# Patient Record
Sex: FEMALE | Race: WHITE | NOT HISPANIC OR LATINO | Employment: FULL TIME
[De-identification: names, ages, dates, MRNs, and addresses within clinical notes are randomized per-mention and may not be internally consistent; named-entity substitution may affect disease eponyms.]

---

## 2017-07-15 ENCOUNTER — HEALTH MAINTENANCE LETTER (OUTPATIENT)
Age: 42
End: 2017-07-15

## 2017-12-25 ENCOUNTER — HOSPITAL ENCOUNTER (EMERGENCY)
Facility: CLINIC | Age: 42
Discharge: HOME | End: 2017-12-25
Attending: EMERGENCY MEDICINE | Admitting: EMERGENCY MEDICINE
Payer: COMMERCIAL

## 2017-12-25 VITALS
OXYGEN SATURATION: 97 % | BODY MASS INDEX: 28.17 KG/M2 | HEIGHT: 64 IN | SYSTOLIC BLOOD PRESSURE: 132 MMHG | HEART RATE: 94 BPM | RESPIRATION RATE: 16 BRPM | DIASTOLIC BLOOD PRESSURE: 93 MMHG | WEIGHT: 165 LBS | TEMPERATURE: 98.2 F

## 2017-12-25 DIAGNOSIS — S39.012A STRAIN OF LUMBAR REGION, INITIAL ENCOUNTER: ICD-10-CM

## 2017-12-25 PROCEDURE — 99283 EMERGENCY DEPT VISIT LOW MDM: CPT

## 2017-12-25 PROCEDURE — 25000132 ZZH RX MED GY IP 250 OP 250 PS 637: Performed by: EMERGENCY MEDICINE

## 2017-12-25 RX ORDER — IBUPROFEN 600 MG/1
600 TABLET, FILM COATED ORAL ONCE
Status: COMPLETED | OUTPATIENT
Start: 2017-12-25 | End: 2017-12-25

## 2017-12-25 RX ORDER — METHOCARBAMOL 750 MG/1
750 TABLET, FILM COATED ORAL ONCE
Status: COMPLETED | OUTPATIENT
Start: 2017-12-25 | End: 2017-12-25

## 2017-12-25 RX ORDER — HYDROCODONE BITARTRATE AND ACETAMINOPHEN 5; 325 MG/1; MG/1
1-2 TABLET ORAL EVERY 4 HOURS PRN
Qty: 15 TABLET | Refills: 0 | Status: SHIPPED | OUTPATIENT
Start: 2017-12-25 | End: 2022-05-23

## 2017-12-25 RX ORDER — METHOCARBAMOL 750 MG/1
750 TABLET, FILM COATED ORAL 3 TIMES DAILY PRN
Qty: 30 TABLET | Refills: 0 | Status: SHIPPED | OUTPATIENT
Start: 2017-12-25 | End: 2022-05-23

## 2017-12-25 RX ADMIN — METHOCARBAMOL 750 MG: 750 TABLET ORAL at 15:32

## 2017-12-25 RX ADMIN — IBUPROFEN 600 MG: 600 TABLET ORAL at 15:32

## 2017-12-25 ASSESSMENT — ENCOUNTER SYMPTOMS
ABDOMINAL PAIN: 0
DIZZINESS: 0
FREQUENCY: 0
DIFFICULTY URINATING: 0
WOUND: 0
WEAKNESS: 1
CHILLS: 0
DYSURIA: 0
HEMATURIA: 0
DIARRHEA: 0
NAUSEA: 0
BACK PAIN: 1
FEVER: 0
NUMBNESS: 0
VOMITING: 0

## 2017-12-25 NOTE — ED AVS SNAPSHOT
[facility] Emergency Department    [address]    [facility] [address]    Phone:  [phone]    Fax:  [phone]                                       Elisabet Norton   MRN: [id number]    Department:  [facility] Emergency Department   Date of Visit:  12/25/2017           Patient Information     Date Of Birth          1975        Your diagnoses for this visit were:     Strain of lumbar region, initial encounter        You were seen by Nataliia Santamaria MD.      Follow-up Information     Follow up with Primary care within 1 week.        Follow up with [facility] Emergency Department.    Specialty:  EMERGENCY MEDICINE    Why:  If symptoms worsen    Contact information:    [address]  [facility] [address] [phone]        Discharge Instructions       Take ibuprofen 600 mg 3x per day.  This will provide both pain control and fight against inflammation.   You were given a prescription for Robaxin.  This is a muscle relaxant medication.  Use this as needed for additional pain control.  You were given a prescription for pain medication.  Use this if ibuprofen and robaxin are not working.        Discharge Instructions  Back Pain  You were seen today for back pain. Back pain can have many causes, but most will get better without surgery or other specific treatment. Sometimes there is a herniated ( slipped ) disc. We don t usually do MRI scans to look for these right away, since most herniated discs will get better on their own with time.  Today, we did not find any evidence that your back pain was caused by a serious condition, such as an infection, fracture, or tumor. However, sometimes symptoms develop over time and cannot be found during an emergency visit, so it is very important that you follow up with your primary doctor.  Return to the Emergency Department if:    You develop a fever with your back pain.     You have  weakness or change in sensation in one or both legs.    You lose control of your bowels or bladder, or can t empty your bladder.    Your pain gets much worse.     Follow-up with your doctor:    Unless your pain has completely gone away, please make an appointment with your doctor within one week.  You may need further management of your back pain, such as more pain medication, imaging such as an X-ray or MRI, or physical therapy.    What can I do to help myself?    Remain Active -- People are often afraid that they will hurt their back further or delay recovery by remaining active, but this is one of the best things you can do for your back. In fact, prolonged bed rest is not recommended. Studies have shown that people with low back pain recover faster when they remain active. Movement helps to bring blood flow to the muscles and relieve muscle spasms as well as preventing loss of muscle strength.    Heat -- Using a heating pad can help with low back pain during the first few weeks. Do not sleep with a heating pad, as you can be burned.     Pain medications - You may take a pain medication such as Tylenol  (acetaminophen), Advil , Nuprin  (ibuprofen) or Aleve  (naproxen).  If you have been given a narcotic such as Vicodin  (hydrocodone with acetaminophen), Percocet  (oxycodone with acetaminophen), codeine, or a muscle relaxant such as Flexeril  (cyclobenzaprine) or Soma  (carisoprodol), do not drive for four hours after you have taken it. If the narcotic contains Tylenol  (acetaminophen), do not take Tylenol  with it. All narcotics will cause constipation, so eat a high fiber diet.   If you were given a prescription for medicine here today, be sure to read all of the information (including the package insert) that comes with your prescription.  This will include important information about the medicine, its side effects, and any warnings that you need to know about.  The pharmacist who fills the prescription can  provide more information and answer questions you may have about the medicine.  If you have questions or concerns that the pharmacist cannot address, please call or return to the Emergency Department.   Opioid Medication Information    Pain medications are among the most commonly prescribed medicines, so we are including this information for all our patients. If you did not receive pain medication or get a prescription for pain medicine, you can ignore it.     You may have been given a prescription for an opioid (narcotic) pain medicine and/or have received a pain medicine while here in the Emergency Department. These medicines can make you drowsy or impaired. You must not drive, operate dangerous equipment, or engage in any other dangerous activities while taking these medications. If you drive while taking these medications, you could be arrested for DUI, or driving under the influence. Do not drink any alcohol while you are taking these medications.     Opioid pain medications can cause addiction. If you have a history of chemical dependency of any type, you are at a higher risk of becoming addicted to pain medications.  Only take these prescribed medications to treat your pain when all other options have been tried. Take it for as short a time and as few doses as possible. Store your pain pills in a secure place, as they are frequently stolen and provide a dangerous opportunity for children or visitors in your house to start abusing these powerful medications. We will not replace any lost or stolen medicine.  As soon as your pain is better, you should flush all your remaining medication.     Many prescription pain medications contain Tylenol  (acetaminophen), including Vicodin , Tylenol #3 , Norco , Lortab , and Percocet .  You should not take any extra pills of Tylenol  if you are using these prescription medications or you can get very sick.  Do not ever take more than 3000 mg of acetaminophen in any 24 hour  period.    All opioids tend to cause constipation. Drink plenty of water and eat foods that have a lot of fiber, such as fruits, vegetables, prune juice, apple juice and high fiber cereal.  Take a laxative if you don t move your bowels at least every other day. Miralax , Milk of Magnesia, Colace , or Senna  can be used to keep you regular.      Remember that you can always come back to the Emergency Department if you are not able to see your regular doctor in the amount of time listed above, if you get any new symptoms, or if there is anything that worries you.          24 Hour Appointment Hotline       To make an appointment at any [facility], call [phone] ([phone]). If you don't have a family doctor or clinic, we will help you find one. [facility] are conveniently located to serve the needs of you and your family.          ED Discharge Orders     [facility] PT, HAND, AND CHIROPRACTIC REFERRAL       **This order will print in the [facility] Scheduling Office**    Physical Therapy, Hand Therapy and Chiropractic Care are available through:    *[facility]  *[facility]  *[facility]    Call one number to schedule at any of the above locations: [phone].    Your provider has referred you to: Physical Therapy at [facility] or [facility]    Indication/Reason for Referral: Low Back Pain  Onset of Illness: 1 week  Therapy Orders: Evaluate and Treat  Special Programs: None  Special Request: None    Kourtney Oconnell      Additional Comments for the Therapist or Chiropractor: no    Please be aware that coverage of these services is subject to the terms and limitations of your health insurance plan.  Call member services at your health plan with any benefit or coverage questions.      Please bring the following to your appointment:    *Your personal calendar for scheduling future appointments  *Comfortable clothing                     Review of your medicines      START  taking        Dose / Directions Last dose taken    HYDROcodone-acetaminophen 5-325 MG per tablet   Commonly known as:  NORCO   Dose:  1-2 tablet   Quantity:  15 tablet        Take 1-2 tablets by mouth every 4 hours as needed for moderate to severe pain   Refills:  0        methocarbamol 750 MG tablet   Commonly known as:  ROBAXIN   Dose:  750 mg   Quantity:  30 tablet        Take 1 tablet (750 mg) by mouth 3 times daily as needed for muscle spasms   Refills:  0                Prescriptions were sent or printed at these locations (2 Prescriptions)                   Other Prescriptions                Printed at Department/Unit printer (2 of 2)         methocarbamol (ROBAXIN) 750 MG tablet               HYDROcodone-acetaminophen (NORCO) 5-325 MG per tablet                Orders Needing Specimen Collection     None      Pending Results     No orders found from 12/23/2017 to 12/26/2017.            Pending Culture Results     No orders found from 12/23/2017 to 12/26/2017.            Pending Results Instructions     If you had any lab results that were not finalized at the time of your Discharge, you can call the ED Lab Result RN at [phone]. You will be contacted by this team for any positive Lab results or changes in treatment. The nurses are available 7 days a week from 10A to 6:30P.  You can leave a message 24 hours per day and they will return your call.        Test Results From Your Hospital Stay               Clinical Quality Measure: Blood Pressure Screening     Your blood pressure was checked while you were in the emergency department today. The last reading we obtained was  BP: (!) 132/93 . Please read the guidelines below about what these numbers mean and what you should do about them.  If your systolic blood pressure (the top number) is less than 120 and your diastolic blood pressure (the bottom number) is less than 80, then your blood pressure is normal. There is nothing more that you need to do about  it.  If your systolic blood pressure (the top number) is 120-139 or your diastolic blood pressure (the bottom number) is 80-89, your blood pressure may be higher than it should be. You should have your blood pressure rechecked within a year by a primary care provider.  If your systolic blood pressure (the top number) is 140 or greater or your diastolic blood pressure (the bottom number) is 90 or greater, you may have high blood pressure. High blood pressure is treatable, but if left untreated over time it can put you at risk for heart attack, stroke, or kidney failure. You should have your blood pressure rechecked by a primary care provider within the next 4 weeks.  If your provider in the emergency department today gave you specific instructions to follow-up with your doctor or provider even sooner than that, you should follow that instruction and not wait for up to 4 weeks for your follow-up visit.        Thank you for choosing [facility]       Thank you for choosing [facility] for your care. Our goal is always to provide you with excellent care. Hearing back from our patients is one way we can continue to improve our services. Please take a few minutes to complete the written survey that you may receive in the mail after you visit with us. Thank you!        [employer] Information     [employer] gives you secure access to your electronic health record. If you see a primary care provider, you can also send messages to your care team and make appointments. If you have questions, please call your primary care clinic.  If you do not have a primary care provider, please call [phone] and they will assist you.        Care EveryWhere ID     This is your Care EveryWhere ID. This could be used by other organizations to access your [facility] medical records  [id number]        Equal Access to Services     CRISS BRYSON : adrian Cotton, mary jones  la'joan mara. So [facility] [phone].    ATENCIÓN: Si habla español, tiene a chauhan disposición servicios gratuitos de asistencia lingüística. Llame al [phone].    We comply with applicable federal civil rights laws and [place] laws. We do not discriminate on the basis of race, color, national origin, age, disability, sex, sexual orientation, or gender identity.            After Visit Summary       This is your record. Keep this with you and show to your community pharmacist(s) and doctor(s) at your next visit.

## 2017-12-25 NOTE — ED AVS SNAPSHOT
[facility] Emergency Department    [address]    [facility] [address]    Phone:  [phone]    Fax:  [phone]                                       Elisabet Norton   MRN: [id number]    Department:  [facility] Emergency Department   Date of Visit:  12/25/2017           After Visit Summary Signature Page     I have received my discharge instructions, and my questions have been answered. I have discussed any challenges I see with this plan with the nurse or doctor.    ..........................................................................................................................................  Patient/Patient Representative Signature      ..........................................................................................................................................  Patient Representative Print Name and Relationship to Patient    ..................................................               ................................................  Date                                            Time    ..........................................................................................................................................  Reviewed by Signature/Title    ...................................................              ..............................................  Date                                                            Time

## 2017-12-25 NOTE — ED PROVIDER NOTES
History     Chief Complaint:  Back Pain    HPI   Elisabet Norton is a 42 year old female who presents with back pain. The patient states she first noticed the back pain Saturday 12/16/17 after an 8 hour flight from [place]. The patient states she began taking 800 mg Ibuprofen every 6 hours and had some resolution of her symptoms by this last Saturday 12/23/17, so she has not taken any Ibuprofen since then. Today, the patient reports she bent down and had sudden onset of the same pain. She notes it is primarily localized to the left lower side of her back, and states it somewhat radiates into her left leg. The patient also notes some unpredictable, intermittent pain in her left leg with an electric sensation. During these episodes, she states it is difficult to walk and she has to hold onto something. The patient denies any numbness, tingling, leg swelling, history of blood clots, bowel or bladder incontinence, leg pain, chest pain, injury to the leg or back, fever, urinary symptoms, abdominal pain, vomiting, or diarrhea.  She has no pain or swelling in the leg itself.    Allergies:  No known drug allergies     Medications:    Wellbutrin    Past Medical History:    Diffuse cystic mastopathy    Past Surgical History:    Tonsillectomy    Family History:    Diabetes  Coronary artery disease with myocardial infarction age 62  Heart disease with pacemaker  Alzheimer disease  Lipids    Social History:  Smoking status: Yes, 0.5 packs per day  Alcohol use: Yes, rarely  PCP: Hector Weiss  Marital Status:  [2]     Review of Systems   Constitutional: Negative for chills and fever.   Cardiovascular: Negative for chest pain and leg swelling.   Gastrointestinal: Negative for abdominal pain, diarrhea, nausea and vomiting.   Genitourinary: Negative for difficulty urinating, dysuria, frequency and hematuria.   Musculoskeletal: Positive for back pain.   Skin: Negative for wound.   Neurological: Positive for weakness.  "Negative for dizziness and numbness.   All other systems reviewed and are negative.    Physical Exam     Patient Vitals for the past 24 hrs:   BP Temp Temp src Pulse Heart Rate Resp SpO2 Height Weight   12/25/17 1511 (!) 132/93 98.2  F (36.8  C) Oral 94 94 16 97 % 1.626 m (5' 4\") 74.8 kg (165 lb)     Physical Exam  Gen: alert  CV: 2+ DP and PT pulses, rrr  pulm: ctab  Abdomen: soft, nontender  Back: No midline tenderness, left paraspinous muscle tenderness  MSK: lower extremities with full AROM at hip, knee and ankle, no swelling or calf tenderness  Neuro: 5/5 strength BLE in hip flexion and ext, knee flexion and ext, plantar and dorsiflexion, and extension of EHL, sensation intact to light touch over all dermatomes of the legs  Skin: skin over torso normal    Emergency Department Course   Interventions:  1532: 600 mg Ibuprofen PO  1532: 750 mg Robaxin PO    Emergency Department Course:  Past medical records, nursing notes, and vitals reviewed.  1519: I performed an exam of the patient and obtained history, as documented above.     I rechecked the patient. Findings and plan explained to the patient. Patient discharged home with instructions regarding supportive care, medications, and reasons to return. The importance of close follow-up was reviewed.     Impression & Plan    Medical Decision Making:  Elisabet Norton is a 42 year old female who presents to the ED for evaluation of back pain. The pain has improved with interventions in the emergency department. The patient did not sustain any focal trauma, therefore x-rays are not necessary due to the low likelihood of fracture or subluxation. The patient has not had a fever, saddle/perineal anesthesia, bilateral foot numbness, or bowel or bladder dysfunction. There is no clinical evidence of cauda equina syndrome, discitis, spinal/epidural space hematoma or abscess.     The neurological exam is normal and the patient's symptoms are consistent with a musculoskeletal " strain. There is no current evidence of radiculopathy or myelopathy. The patient will be discharged with pain medications to use as directed.      The patient was directed to avoid heavy lifting, bending or twisting. The patient was told to return for:  increasing pain, numbness, weakness, or bowel or bladder dysfunction.  he patient was advised to schedule follow-up with his/her primary doctor within 3 days to re-assess symptoms. We also provided a referral to physical therapy.    Diagnosis:   ICD-10-CM   Strain of lumbar region, initial encounter S39.012A     Disposition: Discharged to home    Discharge Medications:  New Prescriptions    HYDROCODONE-ACETAMINOPHEN (NORCO) 5-325 MG PER TABLET    Take 1-2 tablets by mouth every 4 hours as needed for moderate to severe pain    METHOCARBAMOL (ROBAXIN) 750 MG TABLET    Take 1 tablet (750 mg) by mouth 3 times daily as needed for muscle spasms     Talia Barrera  12/25/2017   [facility] EMERGENCY DEPARTMENT    I, Talia Barrera, am serving as a scribe at 3:19 PM on 12/25/2017 to document services personally performed by Nataliia Santamaria MD based on my observations and the provider's statements to me.        Nataliia Santamaria MD  12/25/17 5744

## 2017-12-25 NOTE — ED NOTES
Reports left lower back pain radiating to buttock following 8 hour flight from [place] Saturday; states no longer manageable with OTC medications.  ABCs intact.  Denies trauma.

## 2017-12-25 NOTE — LETTER
December 25, 2017      To Whom It May Concern:      Elisabet Norton was seen in our Emergency Department today, 12/25/17.  I expect her condition to improve over the next 1-2 days.  She may return to work/school when improved.    Sincerely,        Bernadine Rob RN

## 2017-12-25 NOTE — DISCHARGE INSTRUCTIONS
Take ibuprofen 600 mg 3x per day.  This will provide both pain control and fight against inflammation.   You were given a prescription for Robaxin.  This is a muscle relaxant medication.  Use this as needed for additional pain control.  You were given a prescription for pain medication.  Use this if ibuprofen and robaxin are not working.        Discharge Instructions  Back Pain  You were seen today for back pain. Back pain can have many causes, but most will get better without surgery or other specific treatment. Sometimes there is a herniated ( slipped ) disc. We don t usually do MRI scans to look for these right away, since most herniated discs will get better on their own with time.  Today, we did not find any evidence that your back pain was caused by a serious condition, such as an infection, fracture, or tumor. However, sometimes symptoms develop over time and cannot be found during an emergency visit, so it is very important that you follow up with your primary doctor.  Return to the Emergency Department if:    You develop a fever with your back pain.     You have weakness or change in sensation in one or both legs.    You lose control of your bowels or bladder, or can t empty your bladder.    Your pain gets much worse.     Follow-up with your doctor:    Unless your pain has completely gone away, please make an appointment with your doctor within one week.  You may need further management of your back pain, such as more pain medication, imaging such as an X-ray or MRI, or physical therapy.    What can I do to help myself?    Remain Active -- People are often afraid that they will hurt their back further or delay recovery by remaining active, but this is one of the best things you can do for your back. In fact, prolonged bed rest is not recommended. Studies have shown that people with low back pain recover faster when they remain active. Movement helps to bring blood flow to the muscles and relieve muscle  spasms as well as preventing loss of muscle strength.    Heat -- Using a heating pad can help with low back pain during the first few weeks. Do not sleep with a heating pad, as you can be burned.     Pain medications - You may take a pain medication such as Tylenol  (acetaminophen), Advil , Nuprin  (ibuprofen) or Aleve  (naproxen).  If you have been given a narcotic such as Vicodin  (hydrocodone with acetaminophen), Percocet  (oxycodone with acetaminophen), codeine, or a muscle relaxant such as Flexeril  (cyclobenzaprine) or Soma  (carisoprodol), do not drive for four hours after you have taken it. If the narcotic contains Tylenol  (acetaminophen), do not take Tylenol  with it. All narcotics will cause constipation, so eat a high fiber diet.   If you were given a prescription for medicine here today, be sure to read all of the information (including the package insert) that comes with your prescription.  This will include important information about the medicine, its side effects, and any warnings that you need to know about.  The pharmacist who fills the prescription can provide more information and answer questions you may have about the medicine.  If you have questions or concerns that the pharmacist cannot address, please call or return to the Emergency Department.   Opioid Medication Information    Pain medications are among the most commonly prescribed medicines, so we are including this information for all our patients. If you did not receive pain medication or get a prescription for pain medicine, you can ignore it.     You may have been given a prescription for an opioid (narcotic) pain medicine and/or have received a pain medicine while here in the Emergency Department. These medicines can make you drowsy or impaired. You must not drive, operate dangerous equipment, or engage in any other dangerous activities while taking these medications. If you drive while taking these medications, you could be arrested  for DUI, or driving under the influence. Do not drink any alcohol while you are taking these medications.     Opioid pain medications can cause addiction. If you have a history of chemical dependency of any type, you are at a higher risk of becoming addicted to pain medications.  Only take these prescribed medications to treat your pain when all other options have been tried. Take it for as short a time and as few doses as possible. Store your pain pills in a secure place, as they are frequently stolen and provide a dangerous opportunity for children or visitors in your house to start abusing these powerful medications. We will not replace any lost or stolen medicine.  As soon as your pain is better, you should flush all your remaining medication.     Many prescription pain medications contain Tylenol  (acetaminophen), including Vicodin , Tylenol #3 , Norco , Lortab , and Percocet .  You should not take any extra pills of Tylenol  if you are using these prescription medications or you can get very sick.  Do not ever take more than 3000 mg of acetaminophen in any 24 hour period.    All opioids tend to cause constipation. Drink plenty of water and eat foods that have a lot of fiber, such as fruits, vegetables, prune juice, apple juice and high fiber cereal.  Take a laxative if you don t move your bowels at least every other day. Miralax , Milk of Magnesia, Colace , or Senna  can be used to keep you regular.      Remember that you can always come back to the Emergency Department if you are not able to see your regular doctor in the amount of time listed above, if you get any new symptoms, or if there is anything that worries you.

## 2019-11-03 ENCOUNTER — HEALTH MAINTENANCE LETTER (OUTPATIENT)
Age: 44
End: 2019-11-03

## 2020-11-16 ENCOUNTER — HEALTH MAINTENANCE LETTER (OUTPATIENT)
Age: 45
End: 2020-11-16

## 2021-02-07 ENCOUNTER — HEALTH MAINTENANCE LETTER (OUTPATIENT)
Age: 46
End: 2021-02-07

## 2021-09-18 ENCOUNTER — HEALTH MAINTENANCE LETTER (OUTPATIENT)
Age: 46
End: 2021-09-18

## 2021-12-10 ENCOUNTER — TRANSFERRED RECORDS (OUTPATIENT)
Dept: MULTI SPECIALTY CLINIC | Facility: CLINIC | Age: 46
End: 2021-12-10
Payer: COMMERCIAL

## 2022-01-08 ENCOUNTER — HEALTH MAINTENANCE LETTER (OUTPATIENT)
Age: 47
End: 2022-01-08

## 2022-03-05 ENCOUNTER — HEALTH MAINTENANCE LETTER (OUTPATIENT)
Age: 47
End: 2022-03-05

## 2022-03-21 ENCOUNTER — OFFICE VISIT (OUTPATIENT)
Dept: FAMILY MEDICINE | Facility: CLINIC | Age: 47
End: 2022-03-21
Payer: COMMERCIAL

## 2022-03-21 VITALS
DIASTOLIC BLOOD PRESSURE: 84 MMHG | WEIGHT: 178.2 LBS | HEIGHT: 66 IN | SYSTOLIC BLOOD PRESSURE: 124 MMHG | BODY MASS INDEX: 28.64 KG/M2 | OXYGEN SATURATION: 96 % | TEMPERATURE: 98.3 F | RESPIRATION RATE: 18 BRPM | HEART RATE: 87 BPM

## 2022-03-21 DIAGNOSIS — Z23 ENCOUNTER FOR IMMUNIZATION: ICD-10-CM

## 2022-03-21 DIAGNOSIS — Z13.9 SCREENING FOR CONDITION: Primary | ICD-10-CM

## 2022-03-21 PROCEDURE — 86706 HEP B SURFACE ANTIBODY: CPT | Performed by: FAMILY MEDICINE

## 2022-03-21 PROCEDURE — 36415 COLL VENOUS BLD VENIPUNCTURE: CPT | Performed by: FAMILY MEDICINE

## 2022-03-21 PROCEDURE — 99203 OFFICE O/P NEW LOW 30 MIN: CPT | Mod: 25 | Performed by: FAMILY MEDICINE

## 2022-03-21 PROCEDURE — 90471 IMMUNIZATION ADMIN: CPT | Performed by: FAMILY MEDICINE

## 2022-03-21 PROCEDURE — 86481 TB AG RESPONSE T-CELL SUSP: CPT | Performed by: FAMILY MEDICINE

## 2022-03-21 PROCEDURE — 90715 TDAP VACCINE 7 YRS/> IM: CPT | Performed by: FAMILY MEDICINE

## 2022-03-21 ASSESSMENT — ENCOUNTER SYMPTOMS
HEADACHES: 0
BACK PAIN: 0
APPETITE CHANGE: 0
SHORTNESS OF BREATH: 0
AGITATION: 0
ACTIVITY CHANGE: 0
BREAST MASS: 0
COUGH: 0

## 2022-03-21 NOTE — PROGRESS NOTES
"  Assessment & Plan     Screening for condition  - patient is starting a new position .  Will obtain screening Gold quantiferon test .  Immunized for hep b , will obtain antibody level .    - Quantiferon TB Gold Plus  - Hepatitis B Surface Antibody    Encounter for immunization  - TDAP VACCINE (Adacel, Boostrix)  [[id number]]         BMI:   Estimated body mass index is 28.98 kg/m  as calculated from the following:    Height as of this encounter: 1.67 m (5' 5.75\").    Weight as of this encounter: 80.8 kg (178 lb 3.2 oz).   Weight management plan: Discussed healthy diet and exercise guidelines        Return in about 6 months (around 9/21/2022) for Routine preventive.    Aminah Navarro MD  [facility] YULY Bhandari is a 46 year old who presents for the following health issues     Imm/Inj  Pertinent negatives include no chest pain, coughing or headaches.   History of Present Illness       Reason for visit:  Immunization    She eats 2-3 servings of fruits and vegetables daily.She consumes 0 sweetened beverage(s) daily.She exercises with enough effort to increase her heart rate 20 to 29 minutes per day.  She exercises with enough effort to increase her heart rate 4 days per week.   She is taking medications regularly.         Review of Systems   Constitutional: Negative for activity change and appetite change.   Respiratory: Negative for cough and shortness of breath.    Cardiovascular: Negative for chest pain.   Breasts:  Negative for breast mass.   Musculoskeletal: Negative for back pain.   Neurological: Negative for headaches.   Psychiatric/Behavioral: Negative for agitation and behavioral problems.            Objective    /84   Pulse 87   Temp 98.3  F (36.8  C) (Oral)   Resp 18   Ht 1.67 m (5' 5.75\")   Wt 80.8 kg (178 lb 3.2 oz)   LMP 03/04/2022 (Approximate)   SpO2 96%   BMI 28.98 kg/m    Body mass index is 28.98 kg/m .  Physical Exam  Pulmonary:      Effort: Pulmonary effort is " normal.      Breath sounds: Normal breath sounds.   Abdominal:      General: Abdomen is flat.      Palpations: Abdomen is soft.   Musculoskeletal:         General: Normal range of motion.   Skin:     General: Skin is warm.   Neurological:      General: No focal deficit present.   Psychiatric:         Mood and Affect: Mood normal.

## 2022-03-21 NOTE — LETTER
March 23, 2022      Elisabet Elizond[place]iguesusanna  [address] [address] [address]        Dear ,    We are writing to inform you of your test results.    Elisabet     Your gold quantiferon blood test is negative .   Your hep B antibody positive for immunity .   No additional test required .   Let me know if you have any additional question .     Thanks   Aminah Navarro MD.      Resulted Orders   Hepatitis B Surface Antibody   Result Value Ref Range    Hepatitis B Surface Antibody 16.82 >=12.00 m[IU]/mL      Comment:      Reactive, Patient is considered to be immune to infection with hepatitis B when the value is greater than or equal to 12.0 mIU/mL.   Quantiferon TB Gold Plus Grey Tube   Result Value Ref Range    Quantiferon Nil Tube 0.05 IU/mL   Quantiferon TB Gold Plus Green Tube   Result Value Ref Range    Quantiferon TB1 Tube 0.36 IU/mL   Quantiferon TB Gold Plus Yellow Tube   Result Value Ref Range    Quantiferon TB2 Tube 0.23    Quantiferon TB Gold Plus Purple Tube   Result Value Ref Range    Quantiferon Mitogen 10.00 IU/mL   Quantiferon TB Gold Plus   Result Value Ref Range    Quantiferon-TB Gold Plus Negative Negative      Comment:      No interferon gamma response to M.tuberculosis antigens was detected. Infection with M.tuberculosis is unlikely, however a single negative result does not exclude infection. In patients at high risk for infection, a second test should be considered in accordance with the 2017 ATS/IDSA/CDC Clinical Pract  ice Guidelines for Diagnosis of Tuberculosis in Adults and Children     TB1 Ag minus Nil Value 0.31 IU/mL    TB2 Ag minus Nil Value 0.18 IU/mL    Mitogen minus Nil Result 9.95 IU/mL    Nil Result 0.05 IU/mL       If you have any questions or concerns, please call the clinic at the number listed above.       Sincerely,      Aminah Navarro MD

## 2022-03-22 LAB
HBV SURFACE AB SERPL IA-ACNC: 16.82 M[IU]/ML
QUANTIFERON MITOGEN: 10 IU/ML
QUANTIFERON NIL TUBE: 0.05 IU/ML
QUANTIFERON TB1 TUBE: 0.36 IU/ML
QUANTIFERON TB2 TUBE: 0.23

## 2022-03-23 LAB
GAMMA INTERFERON BACKGROUND BLD IA-ACNC: 0.05 IU/ML
M TB IFN-G BLD-IMP: NEGATIVE
M TB IFN-G CD4+ BCKGRND COR BLD-ACNC: 9.95 IU/ML
MITOGEN IGNF BCKGRD COR BLD-ACNC: 0.18 IU/ML
MITOGEN IGNF BCKGRD COR BLD-ACNC: 0.31 IU/ML

## 2022-05-23 ENCOUNTER — OFFICE VISIT (OUTPATIENT)
Dept: FAMILY MEDICINE | Facility: CLINIC | Age: 47
End: 2022-05-23
Payer: COMMERCIAL

## 2022-05-23 VITALS
HEIGHT: 65 IN | WEIGHT: 181 LBS | RESPIRATION RATE: 14 BRPM | BODY MASS INDEX: 30.16 KG/M2 | SYSTOLIC BLOOD PRESSURE: 138 MMHG | TEMPERATURE: 98.6 F | DIASTOLIC BLOOD PRESSURE: 89 MMHG | HEART RATE: 80 BPM

## 2022-05-23 DIAGNOSIS — Z00.00 ROUTINE GENERAL MEDICAL EXAMINATION AT A HEALTH CARE FACILITY: Primary | ICD-10-CM

## 2022-05-23 DIAGNOSIS — Z12.4 CERVICAL CANCER SCREENING: ICD-10-CM

## 2022-05-23 DIAGNOSIS — Z13.9 SCREENING FOR CONDITION: ICD-10-CM

## 2022-05-23 DIAGNOSIS — G44.209 TENSION HEADACHE: ICD-10-CM

## 2022-05-23 DIAGNOSIS — L30.9 DERMATITIS: ICD-10-CM

## 2022-05-23 DIAGNOSIS — Z13.220 SCREENING FOR HYPERLIPIDEMIA: ICD-10-CM

## 2022-05-23 DIAGNOSIS — E55.9 VITAMIN D DEFICIENCY: ICD-10-CM

## 2022-05-23 DIAGNOSIS — N95.1 PERIMENOPAUSAL: ICD-10-CM

## 2022-05-23 PROCEDURE — 87624 HPV HI-RISK TYP POOLED RSLT: CPT | Performed by: FAMILY MEDICINE

## 2022-05-23 PROCEDURE — G0145 SCR C/V CYTO,THINLAYER,RESCR: HCPCS | Performed by: FAMILY MEDICINE

## 2022-05-23 PROCEDURE — 99396 PREV VISIT EST AGE 40-64: CPT | Performed by: FAMILY MEDICINE

## 2022-05-23 PROCEDURE — 99213 OFFICE O/P EST LOW 20 MIN: CPT | Mod: 25 | Performed by: FAMILY MEDICINE

## 2022-05-23 RX ORDER — TRIAMCINOLONE ACETONIDE 1 MG/G
OINTMENT TOPICAL
Qty: 80 G | Refills: 0 | Status: SHIPPED | OUTPATIENT
Start: 2022-05-23 | End: 2022-11-23

## 2022-05-23 SDOH — ECONOMIC STABILITY: INCOME INSECURITY: IN THE LAST 12 MONTHS, WAS THERE A TIME WHEN YOU WERE NOT ABLE TO PAY THE MORTGAGE OR RENT ON TIME?: NO

## 2022-05-23 SDOH — ECONOMIC STABILITY: FOOD INSECURITY: WITHIN THE PAST 12 MONTHS, THE FOOD YOU BOUGHT JUST DIDN'T LAST AND YOU DIDN'T HAVE MONEY TO GET MORE.: NEVER TRUE

## 2022-05-23 SDOH — HEALTH STABILITY: PHYSICAL HEALTH: ON AVERAGE, HOW MANY MINUTES DO YOU ENGAGE IN EXERCISE AT THIS LEVEL?: 30 MIN

## 2022-05-23 SDOH — ECONOMIC STABILITY: INCOME INSECURITY: HOW HARD IS IT FOR YOU TO PAY FOR THE VERY BASICS LIKE FOOD, HOUSING, MEDICAL CARE, AND HEATING?: NOT HARD AT ALL

## 2022-05-23 SDOH — ECONOMIC STABILITY: TRANSPORTATION INSECURITY
IN THE PAST 12 MONTHS, HAS LACK OF TRANSPORTATION KEPT YOU FROM MEETINGS, WORK, OR FROM GETTING THINGS NEEDED FOR DAILY LIVING?: NO

## 2022-05-23 SDOH — ECONOMIC STABILITY: FOOD INSECURITY: WITHIN THE PAST 12 MONTHS, YOU WORRIED THAT YOUR FOOD WOULD RUN OUT BEFORE YOU GOT MONEY TO BUY MORE.: NEVER TRUE

## 2022-05-23 SDOH — ECONOMIC STABILITY: TRANSPORTATION INSECURITY
IN THE PAST 12 MONTHS, HAS THE LACK OF TRANSPORTATION KEPT YOU FROM MEDICAL APPOINTMENTS OR FROM GETTING MEDICATIONS?: NO

## 2022-05-23 SDOH — HEALTH STABILITY: PHYSICAL HEALTH: ON AVERAGE, HOW MANY DAYS PER WEEK DO YOU ENGAGE IN MODERATE TO STRENUOUS EXERCISE (LIKE A BRISK WALK)?: 1 DAY

## 2022-05-23 ASSESSMENT — LIFESTYLE VARIABLES
AUDIT-C TOTAL SCORE: 1
HOW MANY STANDARD DRINKS CONTAINING ALCOHOL DO YOU HAVE ON A TYPICAL DAY: 1 OR 2
HOW OFTEN DO YOU HAVE A DRINK CONTAINING ALCOHOL: MONTHLY OR LESS
SKIP TO QUESTIONS 9-10: 1
HOW OFTEN DO YOU HAVE SIX OR MORE DRINKS ON ONE OCCASION: NEVER

## 2022-05-23 ASSESSMENT — SOCIAL DETERMINANTS OF HEALTH (SDOH)
DO YOU BELONG TO ANY CLUBS OR ORGANIZATIONS SUCH AS CHURCH GROUPS UNIONS, FRATERNAL OR ATHLETIC GROUPS, OR SCHOOL GROUPS?: YES
HOW OFTEN DO YOU GET TOGETHER WITH FRIENDS OR RELATIVES?: ONCE A WEEK
IN A TYPICAL WEEK, HOW MANY TIMES DO YOU TALK ON THE PHONE WITH FAMILY, FRIENDS, OR NEIGHBORS?: MORE THAN THREE TIMES A WEEK
HOW OFTEN DO YOU ATTEND CHURCH OR RELIGIOUS SERVICES?: NEVER

## 2022-05-23 ASSESSMENT — ENCOUNTER SYMPTOMS
CHILLS: 0
NAUSEA: 0
SORE THROAT: 0
SHORTNESS OF BREATH: 0
HEARTBURN: 0
FEVER: 0
DIARRHEA: 0
COUGH: 0
EYE PAIN: 0
FREQUENCY: 0
PALPITATIONS: 0
HEMATURIA: 0
CONSTIPATION: 0
ARTHRALGIAS: 0
WEAKNESS: 0
HEMATOCHEZIA: 0
PARESTHESIAS: 0
MYALGIAS: 0
HEADACHES: 1
JOINT SWELLING: 0
ABDOMINAL PAIN: 0
BREAST MASS: 0
DIZZINESS: 0
NERVOUS/ANXIOUS: 0
DYSURIA: 0

## 2022-05-23 NOTE — PROGRESS NOTES
SUBJECTIVE:   CC: Saloni Norton is an 46 year old woman who presents for preventive health visit.       Patient has been advised of split billing requirements and indicates understanding: Yes  Healthy Habits:     Getting at least 3 servings of Calcium per day:  Yes    Bi-annual eye exam:  Yes    Dental care twice a year:  Yes    Sleep apnea or symptoms of sleep apnea:  None    Diet:  Carbohydrate counting    Frequency of exercise:  1 day/week    Taking medications regularly:  Not Applicable    Medication side effects:  None    PHQ-2 Total Score: 0    Additional concerns today:  Yes    In clinic for annual physical exam     Patient describes doing well       Headaches in the back starts with neck pain .  Difficulty loosing weight and dry skin around ears .    Today's PHQ-2 Score:   PHQ-2 (  [employer]) 2022   Q1: Little interest or pleasure in doing things 0   Q2: Feeling down, depressed or hopeless 0   PHQ-2 Score 0   Q1: Little interest or pleasure in doing things Not at all   Q2: Feeling down, depressed or hopeless Not at all   PHQ-2 Score 0       Abuse: Current or Past (Physical, Sexual or Emotional) - No  Do you feel safe in your environment? Yes    Have you ever done Advance Care Planning? (For example, a Health Directive, POLST, or a discussion with a medical provider or your loved ones about your wishes): No, advance care planning information given to patient to review.  Patient plans to discuss their wishes with loved ones or provider.      Social History     Tobacco Use     Smoking status: Former Smoker     Packs/day: 0.50     Years: 10.00     Pack years: 5.00     Quit date: 2004     Years since quittin.9     Smokeless tobacco: Never Used     Tobacco comment: total smoking about 5 pack years   Substance Use Topics     Alcohol use: Yes     Comment: less than monthly         Alcohol Use 2022   Prescreen: >3 drinks/day or >7 drinks/week? No   Prescreen: >3 drinks/day or >7 drinks/week?  -       Reviewed orders with patient.  Reviewed health maintenance and updated orders accordingly - Yes      Breast Cancer Screening:    FHS-7: No flowsheet data found.  click delete button to remove this line now  Mammogram Screening: Recommended annual mammography  Pertinent mammograms are reviewed under the imaging tab.    History of abnormal Pap smear: NO - age 30-65 PAP every 5 years with negative HPV co-testing recommended  PAP / HPV 5/12/2010 10/10/2008 1/3/2006   PAP (Historical) NIL NIL NIL     Reviewed and updated as needed this visit by clinical staff   Tobacco  Allergies  Meds  Problems  Med Hx  Surg Hx  Fam Hx  Soc   Hx          Reviewed and updated as needed this visit by Provider   Tobacco  Allergies  Meds  Problems  Med Hx  Surg Hx  Fam Hx           Past Medical History:   Diagnosis Date     Diffuse cystic mastopathy     Fibrocystic breasts, asymmetric breast tissue     SUPRV HI-RISK PREG-YOUNG MULTIP 7/28/2006     Tobacco use disorder     quit 2004      Past Surgical History:   Procedure Laterality Date     TONSILLECTOMY  1980    unilateral (right) tonsillectomy       Review of Systems   Constitutional: Negative for chills and fever.   HENT: Negative for congestion, ear pain, hearing loss and sore throat.    Eyes: Negative for pain and visual disturbance.   Respiratory: Negative for cough and shortness of breath.    Cardiovascular: Negative for chest pain, palpitations and peripheral edema.   Gastrointestinal: Negative for abdominal pain, constipation, diarrhea, heartburn, hematochezia and nausea.   Breasts:  Negative for tenderness, breast mass and discharge.   Genitourinary: Negative for dysuria, frequency, genital sores, hematuria, pelvic pain, urgency, vaginal bleeding and vaginal discharge.   Musculoskeletal: Negative for arthralgias, joint swelling and myalgias.   Skin: Negative for rash.   Neurological: Positive for headaches. Negative for dizziness, weakness and paresthesias.  "  Psychiatric/Behavioral: Negative for mood changes. The patient is not nervous/anxious.           OBJECTIVE:   /89 (BP Location: Right arm, Patient Position: Sitting, Cuff Size: Adult Regular)   Pulse 80   Temp 98.6  F (37  C) (Oral)   Resp 14   Ht 1.651 m (5' 5\")   Wt 82.1 kg (181 lb)   LMP 04/30/2022 (Exact Date)   Breastfeeding No   BMI 30.12 kg/m    Physical Exam  GENERAL: healthy, alert and no distress  EYES: Eyes grossly normal to inspection, PERRL and conjunctivae and sclerae normal  HENT: ear canals and TM's normal, nose and mouth without ulcers or lesions  NECK: no adenopathy, no asymmetry, masses, or scars and thyroid normal to palpation  RESP: lungs clear to auscultation - no rales, rhonchi or wheezes  BREAST: normal without masses, tenderness or nipple discharge and no palpable axillary masses or adenopathy  CV: regular rate and rhythm, normal S1 S2, no S3 or S4, no murmur, click or rub, no peripheral edema and peripheral pulses strong  ABDOMEN: soft, nontender, no hepatosplenomegaly, no masses and bowel sounds normal  MS: no gross musculoskeletal defects noted, no edema  SKIN: no suspicious lesions or rashes  NEURO: Normal strength and tone, mentation intact and speech normal  PSYCH: mentation appears normal, affect normal/bright    Diagnostic Test Results:  Labs reviewed in Epic    ASSESSMENT/PLAN:   (Z00.00) Routine general medical examination at a health care facility  (primary encounter diagnosis)  Comment: Discussed healthy eating   Discussed maintaining ideal weight   Plan: Discussed different ways of loosing weight .    (Z12.4) Cervical cancer screening  Comment:   Plan: Pap Screen with HPV - recommended age 30 - 65         years        ASCUS  with negative HPV  .       (Z13.220) Screening for hyperlipidemia  Comment:   Plan: Lipid panel reflex to direct LDL Fasting,         CANCELED: Lipid panel reflex to direct LDL         Fasting            (Z13.9) Screening for " "condition  Comment:   Plan: Hemoglobin A1c, Basic metabolic panel  (Ca, Cl,        CO2, Creat, Gluc, K, Na, BUN), TSH with free T4        reflex            (N95.1) Perimenopausal  Comment: discussed regular exercise   Plan: Follicle stimulating hormone, Luteinizing         Hormone, Adult            (G44.209) Tension headache  Comment: Discussed neck stretching exercise   Plan: CBC with platelets           (E55.9) Vitamin D deficiency  Comment:  Plan: Vitamin D Deficiency            (L30.9) Dermatitis  Comment:   Plan: triamcinolone (KENALOG) 0.1 % external ointment                COUNSELING:  Reviewed preventive health counseling, as reflected in patient instructions       Regular exercise       Healthy diet/nutrition       Vision screening       Hearing screening    Estimated body mass index is 30.12 kg/m  as calculated from the following:    Height as of this encounter: 1.651 m (5' 5\").    Weight as of this encounter: 82.1 kg (181 lb).    Weight management plan: Discussed healthy diet and exercise guidelines    She reports that she quit smoking about 17 years ago. She has a 5.00 pack-year smoking history. She has never used smokeless tobacco.      Counseling Resources:  ATP IV Guidelines  Pooled Cohorts Equation Calculator  Breast Cancer Risk Calculator  BRCA-Related Cancer Risk Assessment: FHS-7 Tool  FRAX Risk Assessment  ICSI Preventive Guidelines  Dietary Guidelines for Americans, 2010  USDA's MyPlate  ASA Prophylaxis  Lung CA Screening    Aminah Navarro MD  [facility]  "

## 2022-05-26 LAB
BKR LAB AP GYN ADEQUACY: NORMAL
BKR LAB AP GYN INTERPRETATION: NORMAL
BKR LAB AP HPV REFLEX: NORMAL
BKR LAB AP PREVIOUS ABNL DX: NORMAL
BKR LAB AP PREVIOUS ABNORMAL: NORMAL
PATH REPORT.COMMENTS IMP SPEC: NORMAL
PATH REPORT.COMMENTS IMP SPEC: NORMAL
PATH REPORT.RELEVANT HX SPEC: NORMAL

## 2022-05-30 LAB
HUMAN PAPILLOMA VIRUS 16 DNA: NEGATIVE
HUMAN PAPILLOMA VIRUS 18 DNA: NEGATIVE
HUMAN PAPILLOMA VIRUS FINAL DIAGNOSIS: NORMAL
HUMAN PAPILLOMA VIRUS OTHER HR: NEGATIVE

## 2022-06-04 ENCOUNTER — LAB (OUTPATIENT)
Dept: LAB | Facility: CLINIC | Age: 47
End: 2022-06-04
Payer: COMMERCIAL

## 2022-06-04 DIAGNOSIS — Z13.9 SCREENING FOR CONDITION: ICD-10-CM

## 2022-06-04 DIAGNOSIS — G44.209 TENSION HEADACHE: ICD-10-CM

## 2022-06-04 DIAGNOSIS — E55.9 VITAMIN D DEFICIENCY: ICD-10-CM

## 2022-06-04 DIAGNOSIS — N95.1 PERIMENOPAUSAL: ICD-10-CM

## 2022-06-04 DIAGNOSIS — Z13.220 SCREENING FOR HYPERLIPIDEMIA: ICD-10-CM

## 2022-06-04 LAB
ANION GAP SERPL CALCULATED.3IONS-SCNC: 3 MMOL/L (ref 3–14)
BUN SERPL-MCNC: 18 MG/DL (ref 7–30)
CALCIUM SERPL-MCNC: 9 MG/DL (ref 8.5–10.1)
CHLORIDE BLD-SCNC: 106 MMOL/L (ref 94–109)
CHOLEST SERPL-MCNC: 175 MG/DL
CO2 SERPL-SCNC: 30 MMOL/L (ref 20–32)
CREAT SERPL-MCNC: 0.72 MG/DL (ref 0.52–1.04)
ERYTHROCYTE [DISTWIDTH] IN BLOOD BY AUTOMATED COUNT: 13.3 % (ref 10–15)
FASTING STATUS PATIENT QL REPORTED: YES
FSH SERPL-ACNC: 1 IU/L
GFR SERPL CREATININE-BSD FRML MDRD: >90 ML/MIN/1.73M2
GLUCOSE BLD-MCNC: 98 MG/DL (ref 70–99)
HBA1C MFR BLD: 5.2 % (ref 0–5.6)
HCT VFR BLD AUTO: 43.4 % (ref 35–47)
HDLC SERPL-MCNC: 75 MG/DL
HGB BLD-MCNC: 15.1 G/DL (ref 11.7–15.7)
LDLC SERPL CALC-MCNC: 81 MG/DL
LH SERPL-ACNC: 7.4 IU/L
MCH RBC QN AUTO: 29.4 PG (ref 26.5–33)
MCHC RBC AUTO-ENTMCNC: 34.8 G/DL (ref 31.5–36.5)
MCV RBC AUTO: 85 FL (ref 78–100)
NONHDLC SERPL-MCNC: 100 MG/DL
PLATELET # BLD AUTO: 175 10E3/UL (ref 150–450)
POTASSIUM BLD-SCNC: 4.2 MMOL/L (ref 3.4–5.3)
RBC # BLD AUTO: 5.13 10E6/UL (ref 3.8–5.2)
SODIUM SERPL-SCNC: 139 MMOL/L (ref 133–144)
TRIGL SERPL-MCNC: 93 MG/DL
TSH SERPL DL<=0.005 MIU/L-ACNC: 1.1 MU/L (ref 0.4–4)
WBC # BLD AUTO: 6.4 10E3/UL (ref 4–11)

## 2022-06-04 PROCEDURE — 83036 HEMOGLOBIN GLYCOSYLATED A1C: CPT

## 2022-06-04 PROCEDURE — 85027 COMPLETE CBC AUTOMATED: CPT

## 2022-06-04 PROCEDURE — 80048 BASIC METABOLIC PNL TOTAL CA: CPT

## 2022-06-04 PROCEDURE — 84443 ASSAY THYROID STIM HORMONE: CPT

## 2022-06-04 PROCEDURE — 36415 COLL VENOUS BLD VENIPUNCTURE: CPT

## 2022-06-04 PROCEDURE — 80061 LIPID PANEL: CPT

## 2022-06-04 PROCEDURE — 82306 VITAMIN D 25 HYDROXY: CPT

## 2022-06-04 PROCEDURE — 83001 ASSAY OF GONADOTROPIN (FSH): CPT

## 2022-06-04 PROCEDURE — 83002 ASSAY OF GONADOTROPIN (LH): CPT

## 2022-06-06 LAB — DEPRECATED CALCIDIOL+CALCIFEROL SERPL-MC: 54 UG/L (ref 20–75)

## 2022-08-14 ENCOUNTER — OFFICE VISIT (OUTPATIENT)
Dept: URGENT CARE | Facility: URGENT CARE | Age: 47
End: 2022-08-14
Payer: COMMERCIAL

## 2022-08-14 VITALS
HEART RATE: 87 BPM | SYSTOLIC BLOOD PRESSURE: 114 MMHG | RESPIRATION RATE: 24 BRPM | TEMPERATURE: 98.4 F | OXYGEN SATURATION: 97 % | DIASTOLIC BLOOD PRESSURE: 76 MMHG

## 2022-08-14 DIAGNOSIS — J01.90 ACUTE SINUSITIS WITH COEXISTING CONDITION REQUIRING PROPHYLACTIC TREATMENT: ICD-10-CM

## 2022-08-14 DIAGNOSIS — H92.02 OTALGIA, LEFT: Primary | ICD-10-CM

## 2022-08-14 PROCEDURE — 99213 OFFICE O/P EST LOW 20 MIN: CPT | Performed by: FAMILY MEDICINE

## 2022-08-14 NOTE — PROGRESS NOTES
SUBJECTIVE:   Saloni Norton is a 47 year old female presenting with a chief complaint of left ear pain.  Developed more left sided sinus and headache.    Onset of symptoms was 1 day(s) ago.  Course of illness is worsening.    Severity mild  Current and Associated symptoms: left ear pain, sinus and headache  Treatment measures tried include None tried.  Predisposing factors include None.    Past Medical History:   Diagnosis Date     Diffuse cystic mastopathy     Fibrocystic breasts, asymmetric breast tissue     SUPRV HI-RISK PREG-YOUNG MULTIP 2006     Tobacco use disorder     quit      Current Outpatient Medications   Medication Sig Dispense Refill     triamcinolone (KENALOG) 0.1 % external ointment EVERY OTHER NIGHT ON AFFECTED AREA FOR 2 WEEKS . 80 g 0     Social History     Tobacco Use     Smoking status: Former Smoker     Packs/day: 0.50     Years: 10.00     Pack years: 5.00     Quit date: 2004     Years since quittin.2     Smokeless tobacco: Never Used     Tobacco comment: total smoking about 5 pack years   Substance Use Topics     Alcohol use: Yes     Comment: less than monthly       ROS:  Review of systems negative except as stated above.    OBJECTIVE:  /76   Pulse 87   Temp 98.4  F (36.9  C) (Oral)   Resp 24   LMP 08/10/2022   SpO2 97%   Breastfeeding No   GENERAL APPEARANCE: healthy, alert and no distress  EYES: EOMI,  PERRL, conjunctiva clear  HENT: ear canals and TM's normal.  Nose and mouth without ulcers, erythema or lesions.  Tenderness on left maxillary sinus on percussion  RESP: lungs with no audible wheezes or increase work of breathing  PSYCH: mentation appears normal and affect normal/bright    ASSESSMENT/PLAN:  (H92.02) Otalgia, left  (primary encounter diagnosis)  Comment: eustachian tube dysfunction  Plan: flonase, sudafed    (J01.90) Acute sinusitis with coexisting condition requiring prophylactic treatment  Plan: amoxicillin-clavulanate (AUGMENTIN) 875-125 MG          tablet            Reassurance given, reviewed that does not have acute ear infection and that ear pain most likely referred pain/eusthacian tube dysfunction.  Okay for flonase or sudafed, tylenol and ibuprofen as needed.  Discussed that if sinus symptoms worsens and/or persists for 10 days, then okay to start antibiotic - RX Augmentin printed and given    Follow up with primary provider if no improvement of symptoms in 1-2 weeks    Sincere Gilmore MD  August 14, 2022 5:39 PM

## 2022-11-08 ENCOUNTER — E-VISIT (OUTPATIENT)
Dept: FAMILY MEDICINE | Facility: CLINIC | Age: 47
End: 2022-11-08
Payer: COMMERCIAL

## 2022-11-08 DIAGNOSIS — R79.89 ABNORMAL TSH: Primary | ICD-10-CM

## 2022-11-08 PROCEDURE — 99207 PR NON-BILLABLE SERV PER CHARTING: CPT | Performed by: FAMILY MEDICINE

## 2022-11-08 NOTE — PATIENT INSTRUCTIONS
Thank you for choosing us for your care. I think an in-clinic visit would be best next steps based on your symptoms. Please schedule a clinic appointment; you won t be charged for this eVisit.      You can schedule an appointment right here in [facility], or call [phone]

## 2022-11-08 NOTE — TELEPHONE ENCOUNTER
Provider E-Visit time total (minutes):     I will have my staff call you to set up a visit in clinic.  Please bring scan with you .    Aminah

## 2022-11-18 ENCOUNTER — TELEPHONE (OUTPATIENT)
Dept: FAMILY MEDICINE | Facility: CLINIC | Age: 47
End: 2022-11-18

## 2022-11-18 ENCOUNTER — OFFICE VISIT (OUTPATIENT)
Dept: FAMILY MEDICINE | Facility: CLINIC | Age: 47
End: 2022-11-18
Payer: COMMERCIAL

## 2022-11-18 VITALS
WEIGHT: 186 LBS | DIASTOLIC BLOOD PRESSURE: 80 MMHG | SYSTOLIC BLOOD PRESSURE: 112 MMHG | HEART RATE: 82 BPM | RESPIRATION RATE: 18 BRPM | OXYGEN SATURATION: 99 % | TEMPERATURE: 98.4 F | HEIGHT: 65 IN | BODY MASS INDEX: 30.99 KG/M2

## 2022-11-18 DIAGNOSIS — E04.1 THYROID NODULE: Primary | ICD-10-CM

## 2022-11-18 DIAGNOSIS — Z12.31 VISIT FOR SCREENING MAMMOGRAM: ICD-10-CM

## 2022-11-18 PROCEDURE — 99213 OFFICE O/P EST LOW 20 MIN: CPT | Performed by: FAMILY MEDICINE

## 2022-11-18 NOTE — TELEPHONE ENCOUNTER
Called Radiology regarding patients report she brought it.     They will not take this form as a consult. You will need to place a new Formal US first , and once completed a group of radiologists will review the results and if they believe it is necessary or needed they will then schedule patient for a biopsy at a later date, they will not do them together    Please place new referral for US if this is the next plan of action for patient     Deepti Alonzo/

## 2022-11-18 NOTE — Clinical Note
Please abstract the following data from this visit with this patient into the appropriate field in Epic:  Tests that can be patient reported without a hard copy:  Other Tests found in the patient's chart through Chart Review/Care Everywhere:  Mammogram done by this group Ramirez this date: 12/10/21 / found in Care Everywhere  Note to Abstraction: If this section is blank, no results were found via Chart Review/Care Everywhere.

## 2022-11-18 NOTE — TELEPHONE ENCOUNTER
Can we please let patient know .  I have placed the thyroid ultrasound scan order .    Thanks   Aminah Navarro md .

## 2022-11-18 NOTE — PROGRESS NOTES
"  Assessment & Plan     Thyroid nodule  Patient was visiting her family outside country .  She seen an endocrinologist , ultrasound scan thyroid demonstrated a thyroid nodule .  - US Biopsy Thyroid Fine Needle Aspiration; Future  - Adult Endocrinology  Referral; Future    Will reach out with the results .    Visit for screening mammogram  Scan ordered            BMI:   Estimated body mass index is 30.95 kg/m  as calculated from the following:    Height as of this encounter: 1.651 m (5' 5\").    Weight as of this encounter: 84.4 kg (186 lb).   Weight management plan: Discussed healthy diet and exercise guidelines        Return in about 6 months (around 5/18/2023) for Follow up, patient will call.    Aminah Navarro MD  [facility] YULY Bhandari is a 47 year old, presenting for the following health issues:  Results (Thyroid U/S)      History of Present Illness       Reason for visit:  Go over ultrasound    She eats 2-3 servings of fruits and vegetables daily.She consumes 0 sweetened beverage(s) daily.She exercises with enough effort to increase her heart rate 9 or less minutes per day.  She exercises with enough effort to increase her heart rate 3 or less days per week.   She is taking medications regularly.     Thyroid Ultrasound done 10/27/22, Done out of country,  [place] .         Review of Systems   Endocrine:        Thyroid nodule .         Objective    /80   Pulse 82   Temp 98.4  F (36.9  C) (Oral)   Resp 18   Ht 1.651 m (5' 5\")   Wt 84.4 kg (186 lb)   LMP 11/10/2022 (Exact Date)   SpO2 99%   BMI 30.95 kg/m    Body mass index is 30.95 kg/m .  Physical Exam  Vitals reviewed.   Constitutional:       Appearance: Normal appearance.   Cardiovascular:      Pulses: Normal pulses.   Pulmonary:      Effort: Pulmonary effort is normal.   Musculoskeletal:      Cervical back: Normal range of motion.   Neurological:      Mental Status: She is alert.                    "

## 2022-11-19 ENCOUNTER — HEALTH MAINTENANCE LETTER (OUTPATIENT)
Age: 47
End: 2022-11-19

## 2022-11-23 ENCOUNTER — HOSPITAL ENCOUNTER (OUTPATIENT)
Dept: ULTRASOUND IMAGING | Facility: CLINIC | Age: 47
Discharge: HOME | End: 2022-11-23
Attending: FAMILY MEDICINE
Payer: COMMERCIAL

## 2022-11-23 DIAGNOSIS — E04.1 THYROID NODULE: ICD-10-CM

## 2022-11-23 DIAGNOSIS — R89.6 ABNORMAL CYTOLOGY: ICD-10-CM

## 2022-11-23 PROCEDURE — 88173 CYTOPATH EVAL FNA REPORT: CPT | Mod: 26

## 2022-11-23 PROCEDURE — 10005 FNA BX W/US GDN 1ST LES: CPT

## 2022-11-23 PROCEDURE — 250N000009 HC RX 250: Performed by: RADIOLOGY

## 2022-11-23 PROCEDURE — 76536 US EXAM OF HEAD AND NECK: CPT

## 2022-11-23 PROCEDURE — 88173 CYTOPATH EVAL FNA REPORT: CPT | Mod: TC | Performed by: FAMILY MEDICINE

## 2022-11-23 RX ADMIN — LIDOCAINE HYDROCHLORIDE 10 ML: 10 INJECTION, SOLUTION EPIDURAL; INFILTRATION; INTRACAUDAL; PERINEURAL at 09:31

## 2022-11-23 NOTE — PROGRESS NOTES
Patient tolerated left thyroid nodule and isthmus biopsy well by Dr. Mayer.  Bandage clean, dry and intact at time of discharge.  Patient states understanding of discharge instructions and left department in satisfactory condition.

## 2022-11-27 DIAGNOSIS — E04.1 THYROID NODULE: Primary | ICD-10-CM

## 2022-11-29 LAB
PATH REPORT.COMMENTS IMP SPEC: ABNORMAL
PATH REPORT.COMMENTS IMP SPEC: YES
PATH REPORT.COMMENTS IMP SPEC: YES
PATH REPORT.FINAL DX SPEC: ABNORMAL
PATH REPORT.FINAL DX SPEC: ABNORMAL
PATH REPORT.GROSS SPEC: ABNORMAL
PATH REPORT.GROSS SPEC: ABNORMAL
PATH REPORT.MICROSCOPIC SPEC OTHER STN: ABNORMAL
PATH REPORT.MICROSCOPIC SPEC OTHER STN: ABNORMAL
PATH REPORT.RELEVANT HX SPEC: ABNORMAL
PATH REPORT.RELEVANT HX SPEC: ABNORMAL

## 2022-11-30 ENCOUNTER — OFFICE VISIT (OUTPATIENT)
Dept: URGENT CARE | Facility: URGENT CARE | Age: 47
End: 2022-11-30
Payer: COMMERCIAL

## 2022-11-30 VITALS
HEART RATE: 77 BPM | SYSTOLIC BLOOD PRESSURE: 141 MMHG | TEMPERATURE: 98.3 F | OXYGEN SATURATION: 97 % | DIASTOLIC BLOOD PRESSURE: 88 MMHG

## 2022-11-30 DIAGNOSIS — R30.0 DYSURIA: Primary | ICD-10-CM

## 2022-11-30 DIAGNOSIS — E04.1 THYROID NODULE: Primary | ICD-10-CM

## 2022-11-30 LAB
ALBUMIN UR-MCNC: NEGATIVE MG/DL
APPEARANCE UR: CLEAR
BACTERIA #/AREA URNS HPF: ABNORMAL /HPF
BILIRUB UR QL STRIP: NEGATIVE
COLOR UR AUTO: YELLOW
GLUCOSE UR STRIP-MCNC: NEGATIVE MG/DL
HGB UR QL STRIP: NEGATIVE
KETONES UR STRIP-MCNC: NEGATIVE MG/DL
LEUKOCYTE ESTERASE UR QL STRIP: ABNORMAL
NITRATE UR QL: NEGATIVE
PH UR STRIP: 6 [PH] (ref 5–7)
RBC #/AREA URNS AUTO: ABNORMAL /HPF
SP GR UR STRIP: 1.01 (ref 1–1.03)
SQUAMOUS #/AREA URNS AUTO: ABNORMAL /LPF
UROBILINOGEN UR STRIP-ACNC: 0.2 E.U./DL
WBC #/AREA URNS AUTO: ABNORMAL /HPF

## 2022-11-30 PROCEDURE — 81001 URINALYSIS AUTO W/SCOPE: CPT

## 2022-11-30 PROCEDURE — 99213 OFFICE O/P EST LOW 20 MIN: CPT | Performed by: FAMILY MEDICINE

## 2022-12-01 NOTE — PROGRESS NOTES
Assessment & Plan     Dysuria    - UA Macro with Reflex to Micro and Culture - lab collect; Future  - UA Macro with Reflex to Micro and Culture - lab collect  - Urine Microscopic    Patient reassured with negative UA today.  Recommend increased fluids and close Follow-up if no change or new or worsening sx prn.    Nazanin Motley MD  [facility] CARE YULY Bhandari is a 47 year old, presenting for the following health issues:  Rule out Urinary Tract Infection (Patient is a 47 yr old female who presents with possible uti. Patient reports onset of symptoms was on Monday, symptoms being urine urgency, blood in urine, pain in lower left side of back)      HPI     Presents to Follow-up LEFT flank pain and urinary urgency.  This went away on own but now ache again noted in LEFT low back.  No fever or flank pain.  Still menstruating.  No vaginal discharge.  No hx of kidney stones.      Review of Systems   Constitutional, HEENT, cardiovascular, pulmonary, GI, , musculoskeletal, neuro, skin, endocrine and psych systems are negative, except as otherwise noted.      Objective    BP (!) 141/88 (BP Location: Right arm, Patient Position: Chair, Cuff Size: Adult Regular)   Pulse 77   Temp 98.3  F (36.8  C) (Oral)   LMP 11/10/2022 (Exact Date)   SpO2 97%   There is no height or weight on file to calculate BMI.  Physical Exam   GENERAL: healthy, alert and no distress  EYES: Eyes grossly normal to inspection, PERRL and conjunctivae and sclerae normal  MS: no gross musculoskeletal defects noted, no edema  SKIN: no suspicious lesions or rashes  PSYCH: mentation appears normal, affect normal/bright    Results for orders placed or performed in visit on 11/30/22 (from the past 24 hour(s))   UA Macro with Reflex to Micro and Culture - lab collect    Specimen: Drain; Urine   Result Value Ref Range    Color Urine Yellow Colorless, Straw, Light Yellow, Yellow    Appearance Urine Clear Clear     Glucose Urine Negative Negative mg/dL    Bilirubin Urine Negative Negative    Ketones Urine Negative Negative mg/dL    Specific Gravity Urine 1.010 1.003 - 1.035    Blood Urine Negative Negative    pH Urine 6.0 5.0 - 7.0    Protein Albumin Urine Negative Negative mg/dL    Urobilinogen Urine 0.2 0.2, 1.0 E.U./dL    Nitrite Urine Negative Negative    Leukocyte Esterase Urine Small (A) Negative   Urine Microscopic   Result Value Ref Range    Bacteria Urine Few (A) None Seen /HPF    RBC Urine 0-2 0-2 /HPF /HPF    WBC Urine 0-5 0-5 /HPF /HPF    Squamous Epithelials Urine Few (A) None Seen /LPF    Narrative    Urine Culture not indicated

## 2022-12-01 NOTE — TELEPHONE ENCOUNTER
DX, Referring NOTES: Thyroid Nodule; referred by Dr. romero    For Cancer Patients: Need the original operative and surgical pathology reports and all imaging reports/images related to the disease (includes all thyroid US, nuclear thyroid and total body scans, PET scans, chest CT reports since prior to the diagnosis ).   APPT DATE: 12/5/2022   NOTES (FOR ALL VISITS) STATUS DETAILS   OFFICE NOTES from referring provider Internal [facility]:  11/18/22, 11/8/22 - [facility] OV with Dr. Romero   OFFICE NOTES from other specialist N/A    ED NOTES N/A    OPERATIVE REPORT  (thyroid, pituitary, adrenal, parathyroid)  (All op notes related to diagnoses) N/A    MEDICATION LIST Internal    IMAGING      ULTRASOUND (HEAD/NECK)  * Include FNAs Internal [facility]:  11/23/22 - US Thyroid FNA  11/23/22 - US Thyroid   LABS     DIABETES: HBGA1C, CREATININE, FASTING LIPIDS, MICROALBUMIN URINE, POTASSIUM, TSH, T4    THYROID: TSH, T4, CBC, THYRODLONULIN, TOTAL T3, FREE T4, CALCITONIN, CEA Internal [facility]:  11/30/22 - Urine Analysis  6/4/22 - BMP  6/4/22 - CBC  6/4/22 - HBGA1C  6/4/22 - Lipid  6/4/22 - TSH, T4  6/4/22 - Vitamin D  10/6/11 - CMP   PATHOLOGY REPORTS WITH CASE NUMBER  *Surgical path reports for endocrine organs (ovaries, testes, pancreas, etc) Internal   [facility]:  11/23/22 - Thyroid FNA (Case: [id number])

## 2022-12-05 ENCOUNTER — PRE VISIT (OUTPATIENT)
Dept: ENDOCRINOLOGY | Facility: CLINIC | Age: 47
End: 2022-12-05

## 2022-12-05 ENCOUNTER — VIRTUAL VISIT (OUTPATIENT)
Dept: ENDOCRINOLOGY | Facility: CLINIC | Age: 47
End: 2022-12-05
Attending: FAMILY MEDICINE
Payer: COMMERCIAL

## 2022-12-05 DIAGNOSIS — R89.6 ABNORMAL CYTOLOGY: ICD-10-CM

## 2022-12-05 DIAGNOSIS — R89.6 ABNORMAL CYTOLOGY: Primary | ICD-10-CM

## 2022-12-05 DIAGNOSIS — E04.1 THYROID NODULE: ICD-10-CM

## 2022-12-05 DIAGNOSIS — Z92.3 PERSONAL HISTORY OF IRRADIATION, PRESENTING HAZARDS TO HEALTH: ICD-10-CM

## 2022-12-05 PROCEDURE — 99205 OFFICE O/P NEW HI 60 MIN: CPT | Mod: 95

## 2022-12-05 ASSESSMENT — ENCOUNTER SYMPTOMS
FLANK PAIN: 0
DIFFICULTY URINATING: 0
HEMATURIA: 0
DYSURIA: 1

## 2022-12-05 NOTE — PROGRESS NOTES
Elisabet is a 47 year old who is being evaluated via a billable video visit.      How would you like to obtain your AVS? [email or web address]  If the video visit is dropped, the invitation should be resent by: Text to cell phone: [phone]  Will anyone else be joining your video visit? Patient and
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
## 2022-12-05 NOTE — PATIENT INSTRUCTIONS
Molecular testing, in the context of thyroid nodules,  refers to the analysis of DNA or RNA obtained from a biopsy specimen, looking for mutations known to be associated with or to increase the risk that the biopsied nodule is cancer.    Clear with your insurance company if they would cover CPT code 67755 [facility] molecular testing on indeterminate thyroid cytology read as atypia of undetermined significance and suspicious for malignancy.   Let us know via [employer].

## 2022-12-06 DIAGNOSIS — Z86.0100 HISTORY OF COLONIC POLYPS: Primary | ICD-10-CM

## 2022-12-09 ENCOUNTER — MYC MEDICAL ADVICE (OUTPATIENT)
Dept: ENDOCRINOLOGY | Facility: CLINIC | Age: 47
End: 2022-12-09

## 2022-12-09 ENCOUNTER — TELEPHONE (OUTPATIENT)
Dept: SURGERY | Facility: CLINIC | Age: 47
End: 2022-12-09

## 2022-12-09 ENCOUNTER — OFFICE VISIT (OUTPATIENT)
Dept: SURGERY | Facility: CLINIC | Age: 47
End: 2022-12-09
Payer: COMMERCIAL

## 2022-12-09 VITALS
OXYGEN SATURATION: 96 % | DIASTOLIC BLOOD PRESSURE: 82 MMHG | RESPIRATION RATE: 16 BRPM | WEIGHT: 186 LBS | HEART RATE: 82 BPM | HEIGHT: 65 IN | BODY MASS INDEX: 30.99 KG/M2 | SYSTOLIC BLOOD PRESSURE: 118 MMHG

## 2022-12-09 DIAGNOSIS — E04.1 THYROID NODULE: ICD-10-CM

## 2022-12-09 DIAGNOSIS — E04.1 THYROID NODULE: Primary | ICD-10-CM

## 2022-12-09 DIAGNOSIS — R89.6 ABNORMAL CYTOLOGY: Primary | ICD-10-CM

## 2022-12-09 PROCEDURE — 99204 OFFICE O/P NEW MOD 45 MIN: CPT | Performed by: SURGERY

## 2022-12-09 NOTE — TELEPHONE ENCOUNTER
Type of surgery: LEFT THYROID LOBECTOMY, AND ISTHMUS ECTOMY, POSSIBLE TOTAL THYROIDECTOMY, POSSIBLE CENTRAL NECK DISSECTION  Location of surgery: [facility]  Date and time of surgery: 1/4/2023 @ 7:30 AM   Surgeon: Charley Humphreys MD   Pre-Op Appt Date: PATIENT TO SCHEDULE    Post-Op Appt Date: PATIENT TO SCHEDULE     Packet sent out: Yes  Pre-cert/Authorization completed:  Not Applicable  Date: 12/9/2022      OUT PATIENT OVERNIGHT LEFT THYROID LOBECTOMY, AND ISTHMUS ECTOMY, POSSIBLE TOTAL THYROIDECTOMY, POSSIBLE CENTRAL NECK DISSECTION     GENERAL PATIENT  INST TO HAVE H&P WITH DR LUCIANO [address] [place] REQ PA ASSIST [facility] NMS

## 2022-12-09 NOTE — LETTER
[facility]    [address], [address]  [place], [place] [address]  Phone [phone]  Fax [phone]    [address], [address]  [place] [address]  Phone [phone]  Fax [phone]    [email or web address]   2022       Saloni Norton    RE: [id number]  : 1975    Saloni Norton has been scheduled for surgery on 2023 at 7:30 am  at [facility] with Dr Charley Humphreys.  The hospital is located at [address] in [place].      Please check in at the Surgery reception desk at 5:30 am . This is located in the back of the hospital on the East side, just past the Emergency Room entrance.       DO NOT EAT OR DRINK ANYTHING 8 HOURS BEFORE YOUR ARRIVAL TIME.  You may have sips of clear liquids up until 2 hours before your arrival time. If you have been advised to take your medication, please do this early in the morning with just sips of clear liquid.       Hospital regulations require an updated pre-operative examination to be completed within 30 days of the procedure. This can be done by your primary care provider. Please ask them to fax documentation to [phone]. We also recommend you bring a copy with you.       You should shower before your surgery with Hibiclens or Exidine soap.  This can be found at your local pharmacy or you can pick it up from our office for free.  Please call our office if you have any questions.       You will receive several calls from our staff 3-7 days prior to your scheduled procedure with further details and to answer any questions you may have.      It is sometimes necessary to adjust the surgery schedule due to emergencies and additions to the schedule.  If your surgery is affected by this, we greatly appreciate your flexibility and understanding in this matter      It is best if you call regarding post-operative questions between the hours of  8:00 am & 3:00 pm Monday-Friday, so you have access to the daytime care team that know you best.  ? Prescription refills are accepted during regular office hours only.      Please do not bring any Disability or FMLA papers to the hospital.  They need to be either faxed ([phone]), mailed or hand delivered to our office by you or a family member for completion.  ? Please allow 14 business days to complete paperwork.        If you have questions or concerns, please contact our office at [phone].

## 2022-12-09 NOTE — LETTER
[facility]    [address], [address]  [place], [place] [address]  Phone [phone]  Fax [phone] [address], [address]  [facility] [place] [address]  Phone [phone]  Fax [phone]    [email or web address]   Showering Before Surgery      Your surgeon has asked you to take 2 showers before surgery.    Why is this important?  It is normal for bacteria (germs) to be on your skin. The skin protects us from these germs. When you have surgery, we cut the skin. Sometimes germs get into the cuts and cause infection (illness caused by germs). By following the instructions below and using special soap, you will lower the number of germs on your skin. This decreases your chance of infection.  Special soap  Buy or get 8 ounces of antiseptic surgical soap called 4% CHG. Common name brands of this soap are Hibiclens and Exidine.  You can find it at your local pharmacy, clinic or retail store. If you have trouble, ask your pharmacist to help you find the right substitute.  A note about shaving:  Do not shave within 12 inches of your incision (surgical cut) area for at least 3 days before surgery. Shaving can make small cuts in the skin. This puts you at a higher risk of infection.  Items you will need for each shower:   1 newly washed towel   4 ounces of one of the above soaps   Clean pajamas or clothes to change into    Follow these instructions:  Follow these steps the evening before surgery and the morning of surgery.  1. Wash your hair and body with your regular shampoo and soap. Make sure you rinse the shampoo and soap from your hair and body.  2. Using clean hands, apply about 2 ounces of soap gently on your skin from your ear lobes to your toes. Use on your groin area last. Do not use this soap on your face or head. If you get any soap in your eyes, ears or mouth, rinse right away.  3. Repeat step 2. It is very important to let the  soap stay on your skin for at least 1 minute.    4. Rinse well and dry off using a clean towel.    If you feel any tingling, itching or other irritation, rinse right away. It is normal to feel some coolness on the skin after using the antiseptic soap. Your skin may feel a bit dry after the shower, but do not use any lotions, creams or moisturizers. Do not use hair spray or other products in your hair.  5.  Dress in freshly washed clothes or pajamas. Use fresh pillowcases and sheets on your bed.    Repeat these steps the morning of surgery.  If you have any questions about showering or an allergy to CHG soap, please call your surgery center.
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
## 2022-12-09 NOTE — PATIENT INSTRUCTIONS
ULTRASOUND SOFT TISSUE NECK - NODE MAPPING     Date: 12/14/2022  Time: 3:00 PM   Location: [facility]  [id number] [place] [address]  [id number]       Please check in at 2:45 PM

## 2022-12-14 ENCOUNTER — HOSPITAL ENCOUNTER (OUTPATIENT)
Dept: ULTRASOUND IMAGING | Facility: CLINIC | Age: 47
Discharge: HOME | End: 2022-12-14
Attending: SURGERY | Admitting: SURGERY
Payer: COMMERCIAL

## 2022-12-14 DIAGNOSIS — E04.1 THYROID NODULE: ICD-10-CM

## 2022-12-14 PROCEDURE — 76536 US EXAM OF HEAD AND NECK: CPT

## 2022-12-22 LAB — SCANNED LAB RESULT: NORMAL

## 2022-12-27 ENCOUNTER — OFFICE VISIT (OUTPATIENT)
Dept: FAMILY MEDICINE | Facility: CLINIC | Age: 47
End: 2022-12-27
Payer: COMMERCIAL

## 2022-12-27 ENCOUNTER — TELEPHONE (OUTPATIENT)
Dept: ENDOCRINOLOGY | Facility: CLINIC | Age: 47
End: 2022-12-27

## 2022-12-27 VITALS
DIASTOLIC BLOOD PRESSURE: 86 MMHG | SYSTOLIC BLOOD PRESSURE: 122 MMHG | WEIGHT: 190.6 LBS | RESPIRATION RATE: 16 BRPM | BODY MASS INDEX: 31.75 KG/M2 | HEART RATE: 89 BPM | HEIGHT: 65 IN | OXYGEN SATURATION: 96 %

## 2022-12-27 DIAGNOSIS — Z87.891 FORMER SMOKER: ICD-10-CM

## 2022-12-27 DIAGNOSIS — E04.1 THYROID NODULE: ICD-10-CM

## 2022-12-27 DIAGNOSIS — Z01.818 PRE-OP EXAM: Primary | ICD-10-CM

## 2022-12-27 LAB
ERYTHROCYTE [DISTWIDTH] IN BLOOD BY AUTOMATED COUNT: 13.6 % (ref 10–15)
HCT VFR BLD AUTO: 41.8 % (ref 35–47)
HGB BLD-MCNC: 14.2 G/DL (ref 11.7–15.7)
MCH RBC QN AUTO: 28.5 PG (ref 26.5–33)
MCHC RBC AUTO-ENTMCNC: 34 G/DL (ref 31.5–36.5)
MCV RBC AUTO: 84 FL (ref 78–100)
PLATELET # BLD AUTO: 198 10E3/UL (ref 150–450)
RBC # BLD AUTO: 4.99 10E6/UL (ref 3.8–5.2)
SCANNED LAB RESULT: NORMAL
WBC # BLD AUTO: 7.7 10E3/UL (ref 4–11)

## 2022-12-27 PROCEDURE — 36415 COLL VENOUS BLD VENIPUNCTURE: CPT | Performed by: FAMILY MEDICINE

## 2022-12-27 PROCEDURE — 99214 OFFICE O/P EST MOD 30 MIN: CPT | Performed by: FAMILY MEDICINE

## 2022-12-27 PROCEDURE — 85027 COMPLETE CBC AUTOMATED: CPT | Performed by: FAMILY MEDICINE

## 2022-12-27 PROCEDURE — 80048 BASIC METABOLIC PNL TOTAL CA: CPT | Performed by: FAMILY MEDICINE

## 2022-12-27 ASSESSMENT — PAIN SCALES - GENERAL: PAINLEVEL: NO PAIN (0)

## 2022-12-27 NOTE — TELEPHONE ENCOUNTER
----- Message from Charley Humphreys MD sent at 12/27/2022 12:53 PM CST -----  Regarding: RE: Ok Armstrong, will plan for a total thyroidectomy. She is scheduled for next Wednesday. Thanks for reaching out with the results.    -Charley Humphreys  ----- Message -----  From: Andreina Clarke MD  Sent: 12/27/2022  12:24 PM CST  To: Andreina Clarke MD, Charley Humphreys MD  Subject: Ok                                           She has now had Afirma testing on both thyroid nodules   The 3.6 cm isthmus AUS is Afirma GSC suspicious and the 1.8 cm left suspicious for thyroid cancer is a HRAS mutation.   I recommended to her that she have total thyroidectomy based on these results plus the fact that any residual thyroid will remain at risk for life given her history of possible [place] exposure.    Andreina Clarke

## 2022-12-27 NOTE — PROGRESS NOTES
[facility]  [address] [facility] [address]  Phone: [phone]  Primary Provider: Aminah Navarro  Pre-op Performing Provider: AVTAR WINKLER      PREOPERATIVE EVALUATION:  Today's date: 12/27/2022    Saloni Norton is a 47 year old female who presents for a preoperative evaluation.    Surgical Information:  Surgery/Procedure: Left thyroid lobectomy and isthmusectomy, possible total thyroidectomy, possible central neck dissection  Surgery Location: [facility]  Surgeon: Petr  Surgery Date: 1/4/2023  Time of Surgery: 7:40 AM  Where patient plans to recover: At home with family  Fax number for surgical facility: Note does not need to be faxed, will be available electronically in Epic.    Type of Anesthesia Anticipated: General    Assessment & Plan     The proposed surgical procedure is considered INTERMEDIATE risk.    Pre-op exam  - CBC with platelets; Future  - Basic metabolic panel  (Ca, Cl, CO2, Creat, Gluc, K, Na, BUN); Future  - CBC with platelets  - Basic metabolic panel  (Ca, Cl, CO2, Creat, Gluc, K, Na, BUN)    Thyroid nodule    Former smoker    Risks and Recommendations:  The patient has the following additional risks and recommendations for perioperative complications:   - No identified additional risk factors other than previously addressed    Medication Instructions:  Patient is on no chronic medications    RECOMMENDATION:  APPROVAL GIVEN to proceed with proposed procedure, without further diagnostic evaluation.      Subjective     HPI related to upcoming procedure: thyroid nodules suspicious for cancer    Preop Questions 12/27/2022   1. Have you ever had a heart attack or stroke? No   2. Have you ever had surgery on your heart or blood vessels, such as a stent placement, a coronary artery bypass, or surgery on an artery in your head, neck, heart, or legs? No   3. Do you have chest pain with activity? No   4. Do you have a history of  heart  failure? No   5. Do you currently have a cold, bronchitis or symptoms of other infection? No   6. Do you have a cough, shortness of breath, or wheezing? No   7. Do you or anyone in your family have previous history of blood clots? No   8. Do you or does anyone in your family have a serious bleeding problem such as prolonged bleeding following surgeries or cuts? No   9. Have you ever had problems with anemia or been told to take iron pills? No   10. Have you had any abnormal blood loss such as black, tarry or bloody stools, or abnormal vaginal bleeding? No   11. Have you ever had a blood transfusion? No   12. Are you willing to have a blood transfusion if it is medically needed before, during, or after your surgery? Yes   13. Have you or any of your relatives ever had problems with anesthesia? YES - brother with nausea, lso has motion sickness. Elisabet has been fine with conscious sedation   14. Do you have sleep apnea, excessive snoring or daytime drowsiness? No   15. Do you have any artifical heart valves or other implanted medical devices like a pacemaker, defibrillator, or continuous glucose monitor? No   16. Do you have artificial joints? No   17. Are you allergic to latex? No   18. Is there any chance that you may be pregnant? No       Health Care Directive:  Patient does not have a Health Care Directive or Living Will: Discussed advance care planning with patient; information given to patient to review.    Preoperative Review of :   reviewed - no record of controlled substances prescribed.      Status of Chronic Conditions:  See problem list for active medical problems.  Problems all longstanding and stable, except as noted/documented.  See ROS for pertinent symptoms related to these conditions.      Review of Systems  Constitutional, neuro, ENT, endocrine, pulmonary, cardiac, gastrointestinal, genitourinary, musculoskeletal, integument and psychiatric systems are negative, except as otherwise  noted.    Patient Active Problem List    Diagnosis Date Noted     Thyroid nodule 2022     Priority: Medium     Abnormal cytology 2022     Priority: Medium     Personal history of irradiation, presenting hazards to health 2022     Priority: Medium     Colon polyp 2015     Priority: Medium     Formatting of this note might be different from the original.  2015 Colonoscopy. Repeat in 3 Years  Formatting of this note might be different from the original.  Formatting of this note might be different from the original.  2015 Colonoscopy. Repeat in 3 Years       Skin mole 06/10/2013     Priority: Medium     Tobacco abuse 10/06/2011     Priority: Medium     CARDIOVASCULAR SCREENING; LDL GOAL LESS THAN 160 02/10/2010     Priority: Medium      Past Medical History:   Diagnosis Date     Colon polyps      Diffuse cystic mastopathy     Fibrocystic breasts, asymmetric breast tissue     History of radiation exposure     [place] age 12 [facility] HI-RISK PREG-YOUNG MULTIP 2006     Thyroid nodule      Tobacco use disorder     quit      Past Surgical History:   Procedure Laterality Date     BIOPSY  2022     TONSILLECTOMY  1980    unilateral (right) tonsillectomy     No current outpatient medications on file.       No Known Allergies     Social History     Tobacco Use     Smoking status: Former     Packs/day: 0.50     Years: 10.00     Pack years: 5.00     Types: Cigarettes     Quit date: 2004     Years since quittin.5     Smokeless tobacco: Never     Tobacco comments:     total smoking about 5 pack years   Substance Use Topics     Alcohol use: Yes     Comment: less than monthly     Family History   Problem Relation Age of Onset     Heart Disease Mother         pacemaker     Thyroid nodules Mother      Thyroid Disease Mother      Heart Disease Father      Diabetes Father      C.A.D. Father         MI age 62     Alzheimer Disease Father      Hypertension Father       "Hyperlipidemia Father      Lipids Brother      Breast Cancer Maternal Grandmother         and MAliset had post-menopausal cancer     Neurologic Disorder Paternal Grandmother         Alzheimer's     Lymphoma Cousin      Cancer - colorectal No family hx of      Thyroid Cancer No family hx of      History   Drug Use No         Objective     /86   Pulse 89   Resp 16   Ht 1.651 m (5' 5\")   Wt 86.5 kg (190 lb 9.6 oz)   LMP 12/24/2022 (Exact Date)   SpO2 96%   BMI 31.72 kg/m      Physical Exam    GENERAL APPEARANCE: healthy, alert and no distress     EYES: EOMI, PERRL     HENT: ear canals and TM's normal and nose and mouth without ulcers or lesions     NECK: no adenopathy, no asymmetry, masses, or scars and thyroid normal to palpation     RESP: lungs clear to auscultation - no rales, rhonchi or wheezes     CV: regular rates and rhythm, normal S1 S2, no S3 or S4 and no murmur, click or rub     ABDOMEN:  soft, nontender, no HSM or masses and bowel sounds normal     MS: extremities normal- no gross deformities noted, no evidence of inflammation in joints, FROM in all extremities.     SKIN: no suspicious lesions or rashes     NEURO: Normal strength and tone, sensory exam grossly normal, mentation intact and speech normal     PSYCH: mentation appears normal. and affect normal/bright     LYMPHATICS: No cervical adenopathy    Recent Labs   Lab Test 06/04/22  1016   HGB 15.1         POTASSIUM 4.2   CR 0.72   A1C 5.2        Diagnostics:  Labs pending at this time.  Results will be reviewed when available.   No EKG required, no history of coronary heart disease, significant arrhythmia, peripheral arterial disease or other structural heart disease.    Revised Cardiac Risk Index (RCRI):  The patient has the following serious cardiovascular risks for perioperative complications:   - No serious cardiac risks = 0 points     RCRI Interpretation: 0 points: Class I (very low risk - 0.4% complication rate)       "     Signed Electronically by: Marielle Mancilla MD  Copy of this evaluation report is provided to requesting physician.

## 2022-12-28 LAB
ANION GAP SERPL CALCULATED.3IONS-SCNC: 12 MMOL/L (ref 7–15)
BUN SERPL-MCNC: 17.9 MG/DL (ref 6–20)
CALCIUM SERPL-MCNC: 9.1 MG/DL (ref 8.6–10)
CHLORIDE SERPL-SCNC: 103 MMOL/L (ref 98–107)
CREAT SERPL-MCNC: 0.74 MG/DL (ref 0.51–0.95)
DEPRECATED HCO3 PLAS-SCNC: 25 MMOL/L (ref 22–29)
GFR SERPL CREATININE-BSD FRML MDRD: >90 ML/MIN/1.73M2
GLUCOSE SERPL-MCNC: 98 MG/DL (ref 70–99)
POTASSIUM SERPL-SCNC: 4.1 MMOL/L (ref 3.4–5.3)
SODIUM SERPL-SCNC: 140 MMOL/L (ref 136–145)

## 2022-12-29 ENCOUNTER — ANCILLARY PROCEDURE (OUTPATIENT)
Dept: MAMMOGRAPHY | Facility: CLINIC | Age: 47
End: 2022-12-29
Attending: FAMILY MEDICINE
Payer: COMMERCIAL

## 2022-12-29 DIAGNOSIS — Z12.31 VISIT FOR SCREENING MAMMOGRAM: ICD-10-CM

## 2022-12-29 PROCEDURE — 77063 BREAST TOMOSYNTHESIS BI: CPT | Mod: TC | Performed by: RADIOLOGY

## 2022-12-29 PROCEDURE — 77067 SCR MAMMO BI INCL CAD: CPT | Mod: TC | Performed by: RADIOLOGY

## 2023-01-04 ENCOUNTER — HOSPITAL ENCOUNTER (OUTPATIENT)
Facility: CLINIC | Age: 48
Discharge: HOME | End: 2023-01-04
Attending: SURGERY | Admitting: SURGERY
Payer: COMMERCIAL

## 2023-01-04 ENCOUNTER — ANESTHESIA (OUTPATIENT)
Dept: SURGERY | Facility: CLINIC | Age: 48
End: 2023-01-04
Payer: COMMERCIAL

## 2023-01-04 ENCOUNTER — APPOINTMENT (OUTPATIENT)
Dept: SURGERY | Facility: PHYSICIAN GROUP | Age: 48
End: 2023-01-04
Payer: COMMERCIAL

## 2023-01-04 ENCOUNTER — ANESTHESIA EVENT (OUTPATIENT)
Dept: SURGERY | Facility: CLINIC | Age: 48
End: 2023-01-04
Payer: COMMERCIAL

## 2023-01-04 VITALS
DIASTOLIC BLOOD PRESSURE: 93 MMHG | BODY MASS INDEX: 31.57 KG/M2 | HEIGHT: 65 IN | SYSTOLIC BLOOD PRESSURE: 136 MMHG | WEIGHT: 189.5 LBS | HEART RATE: 106 BPM | TEMPERATURE: 99.6 F | RESPIRATION RATE: 18 BRPM | OXYGEN SATURATION: 92 %

## 2023-01-04 DIAGNOSIS — E04.1 THYROID NODULE: Primary | ICD-10-CM

## 2023-01-04 PROCEDURE — 250N000013 HC RX MED GY IP 250 OP 250 PS 637: Performed by: ANESTHESIOLOGY

## 2023-01-04 PROCEDURE — 250N000011 HC RX IP 250 OP 636: Performed by: SURGERY

## 2023-01-04 PROCEDURE — 258N000003 HC RX IP 258 OP 636: Performed by: ANESTHESIOLOGY

## 2023-01-04 PROCEDURE — 88331 PATH CONSLTJ SURG 1 BLK 1SPC: CPT | Mod: 26 | Performed by: PATHOLOGY

## 2023-01-04 PROCEDURE — 60220 PARTIAL REMOVAL OF THYROID: CPT | Mod: LT | Performed by: PHYSICIAN ASSISTANT

## 2023-01-04 PROCEDURE — 88332 PATH CONSLTJ SURG EA ADD BLK: CPT | Mod: 26 | Performed by: PATHOLOGY

## 2023-01-04 PROCEDURE — 272N000001 HC OR GENERAL SUPPLY STERILE: Performed by: SURGERY

## 2023-01-04 PROCEDURE — 250N000011 HC RX IP 250 OP 636: Performed by: NURSE ANESTHETIST, CERTIFIED REGISTERED

## 2023-01-04 PROCEDURE — 250N000009 HC RX 250: Performed by: NURSE ANESTHETIST, CERTIFIED REGISTERED

## 2023-01-04 PROCEDURE — 60220 PARTIAL REMOVAL OF THYROID: CPT | Mod: LT | Performed by: SURGERY

## 2023-01-04 PROCEDURE — 88307 TISSUE EXAM BY PATHOLOGIST: CPT | Mod: 26 | Performed by: PATHOLOGY

## 2023-01-04 PROCEDURE — 710N000009 HC RECOVERY PHASE 1, LEVEL 1, PER MIN: Performed by: SURGERY

## 2023-01-04 PROCEDURE — 370N000017 HC ANESTHESIA TECHNICAL FEE, PER MIN: Performed by: SURGERY

## 2023-01-04 PROCEDURE — 258N000003 HC RX IP 258 OP 636: Performed by: NURSE ANESTHETIST, CERTIFIED REGISTERED

## 2023-01-04 PROCEDURE — 999N000141 HC STATISTIC PRE-PROCEDURE NURSING ASSESSMENT: Performed by: SURGERY

## 2023-01-04 PROCEDURE — 88331 PATH CONSLTJ SURG 1 BLK 1SPC: CPT | Mod: TC | Performed by: SURGERY

## 2023-01-04 PROCEDURE — 710N000012 HC RECOVERY PHASE 2, PER MINUTE: Performed by: SURGERY

## 2023-01-04 PROCEDURE — 88307 TISSUE EXAM BY PATHOLOGIST: CPT | Mod: TC | Performed by: SURGERY

## 2023-01-04 PROCEDURE — 250N000009 HC RX 250: Performed by: SURGERY

## 2023-01-04 PROCEDURE — 88305 TISSUE EXAM BY PATHOLOGIST: CPT | Mod: 26 | Performed by: PATHOLOGY

## 2023-01-04 PROCEDURE — 360N000076 HC SURGERY LEVEL 3, PER MIN: Performed by: SURGERY

## 2023-01-04 RX ORDER — ONDANSETRON 2 MG/ML
4 INJECTION INTRAMUSCULAR; INTRAVENOUS EVERY 30 MIN PRN
Status: DISCONTINUED | OUTPATIENT
Start: 2023-01-04 | End: 2023-01-04 | Stop reason: HOSPADM

## 2023-01-04 RX ORDER — LABETALOL HYDROCHLORIDE 5 MG/ML
10 INJECTION, SOLUTION INTRAVENOUS
Status: DISCONTINUED | OUTPATIENT
Start: 2023-01-04 | End: 2023-01-04 | Stop reason: HOSPADM

## 2023-01-04 RX ORDER — SODIUM CHLORIDE, SODIUM LACTATE, POTASSIUM CHLORIDE, CALCIUM CHLORIDE 600; 310; 30; 20 MG/100ML; MG/100ML; MG/100ML; MG/100ML
INJECTION, SOLUTION INTRAVENOUS CONTINUOUS
Status: DISCONTINUED | OUTPATIENT
Start: 2023-01-04 | End: 2023-01-04 | Stop reason: HOSPADM

## 2023-01-04 RX ORDER — CEFAZOLIN SODIUM/WATER 2 G/20 ML
2 SYRINGE (ML) INTRAVENOUS
Status: COMPLETED | OUTPATIENT
Start: 2023-01-04 | End: 2023-01-04

## 2023-01-04 RX ORDER — ONDANSETRON 4 MG/1
4 TABLET, ORALLY DISINTEGRATING ORAL EVERY 6 HOURS PRN
Status: CANCELLED | OUTPATIENT
Start: 2023-01-04

## 2023-01-04 RX ORDER — KETAMINE HYDROCHLORIDE 10 MG/ML
INJECTION INTRAMUSCULAR; INTRAVENOUS PRN
Status: DISCONTINUED | OUTPATIENT
Start: 2023-01-04 | End: 2023-01-04

## 2023-01-04 RX ORDER — CEFAZOLIN SODIUM/WATER 2 G/20 ML
2 SYRINGE (ML) INTRAVENOUS SEE ADMIN INSTRUCTIONS
Status: DISCONTINUED | OUTPATIENT
Start: 2023-01-04 | End: 2023-01-04 | Stop reason: HOSPADM

## 2023-01-04 RX ORDER — ACETAMINOPHEN 325 MG/1
650 TABLET ORAL EVERY 6 HOURS PRN
Status: CANCELLED | OUTPATIENT
Start: 2023-01-04

## 2023-01-04 RX ORDER — OXYCODONE HYDROCHLORIDE 5 MG/1
5-10 TABLET ORAL EVERY 4 HOURS PRN
Qty: 16 TABLET | Refills: 0 | Status: SHIPPED | OUTPATIENT
Start: 2023-01-04 | End: 2023-01-19

## 2023-01-04 RX ORDER — PROPOFOL 10 MG/ML
INJECTION, EMULSION INTRAVENOUS PRN
Status: DISCONTINUED | OUTPATIENT
Start: 2023-01-04 | End: 2023-01-04

## 2023-01-04 RX ORDER — MEPERIDINE HYDROCHLORIDE 25 MG/ML
12.5 INJECTION INTRAMUSCULAR; INTRAVENOUS; SUBCUTANEOUS
Status: DISCONTINUED | OUTPATIENT
Start: 2023-01-04 | End: 2023-01-04 | Stop reason: HOSPADM

## 2023-01-04 RX ORDER — NALOXONE HYDROCHLORIDE 0.4 MG/ML
0.2 INJECTION, SOLUTION INTRAMUSCULAR; INTRAVENOUS; SUBCUTANEOUS
Status: DISCONTINUED | OUTPATIENT
Start: 2023-01-04 | End: 2023-01-04 | Stop reason: HOSPADM

## 2023-01-04 RX ORDER — OXYCODONE HYDROCHLORIDE 10 MG/1
10 TABLET ORAL EVERY 4 HOURS PRN
Status: CANCELLED | OUTPATIENT
Start: 2023-01-04

## 2023-01-04 RX ORDER — ACETAMINOPHEN 325 MG/1
975 TABLET ORAL ONCE
Status: COMPLETED | OUTPATIENT
Start: 2023-01-04 | End: 2023-01-04

## 2023-01-04 RX ORDER — FENTANYL CITRATE 50 UG/ML
50 INJECTION, SOLUTION INTRAMUSCULAR; INTRAVENOUS EVERY 5 MIN PRN
Status: DISCONTINUED | OUTPATIENT
Start: 2023-01-04 | End: 2023-01-04 | Stop reason: HOSPADM

## 2023-01-04 RX ORDER — MECLIZINE HYDROCHLORIDE 25 MG/1
25 TABLET ORAL ONCE
Status: COMPLETED | OUTPATIENT
Start: 2023-01-04 | End: 2023-01-04

## 2023-01-04 RX ORDER — PHENYLEPHRINE HYDROCHLORIDE 10 MG/ML
INJECTION INTRAVENOUS PRN
Status: DISCONTINUED | OUTPATIENT
Start: 2023-01-04 | End: 2023-01-04

## 2023-01-04 RX ORDER — FENTANYL CITRATE 50 UG/ML
25 INJECTION, SOLUTION INTRAMUSCULAR; INTRAVENOUS EVERY 5 MIN PRN
Status: DISCONTINUED | OUTPATIENT
Start: 2023-01-04 | End: 2023-01-04 | Stop reason: HOSPADM

## 2023-01-04 RX ORDER — ACETAMINOPHEN 325 MG/1
650 TABLET ORAL EVERY 4 HOURS PRN
Qty: 100 TABLET | Refills: 0 | COMMUNITY
Start: 2023-01-04 | End: 2023-01-19

## 2023-01-04 RX ORDER — ONDANSETRON 4 MG/1
4 TABLET, ORALLY DISINTEGRATING ORAL EVERY 30 MIN PRN
Status: DISCONTINUED | OUTPATIENT
Start: 2023-01-04 | End: 2023-01-04 | Stop reason: HOSPADM

## 2023-01-04 RX ORDER — BUPIVACAINE HYDROCHLORIDE 5 MG/ML
INJECTION, SOLUTION PERINEURAL PRN
Status: DISCONTINUED | OUTPATIENT
Start: 2023-01-04 | End: 2023-01-04 | Stop reason: HOSPADM

## 2023-01-04 RX ORDER — FENTANYL CITRATE 50 UG/ML
INJECTION, SOLUTION INTRAMUSCULAR; INTRAVENOUS PRN
Status: DISCONTINUED | OUTPATIENT
Start: 2023-01-04 | End: 2023-01-04

## 2023-01-04 RX ORDER — LIDOCAINE 40 MG/G
CREAM TOPICAL
Status: CANCELLED | OUTPATIENT
Start: 2023-01-04

## 2023-01-04 RX ORDER — NALOXONE HYDROCHLORIDE 0.4 MG/ML
0.4 INJECTION, SOLUTION INTRAMUSCULAR; INTRAVENOUS; SUBCUTANEOUS
Status: DISCONTINUED | OUTPATIENT
Start: 2023-01-04 | End: 2023-01-04 | Stop reason: HOSPADM

## 2023-01-04 RX ORDER — DEXAMETHASONE SODIUM PHOSPHATE 4 MG/ML
INJECTION, SOLUTION INTRA-ARTICULAR; INTRALESIONAL; INTRAMUSCULAR; INTRAVENOUS; SOFT TISSUE PRN
Status: DISCONTINUED | OUTPATIENT
Start: 2023-01-04 | End: 2023-01-04

## 2023-01-04 RX ORDER — SODIUM CHLORIDE, SODIUM LACTATE, POTASSIUM CHLORIDE, CALCIUM CHLORIDE 600; 310; 30; 20 MG/100ML; MG/100ML; MG/100ML; MG/100ML
INJECTION, SOLUTION INTRAVENOUS CONTINUOUS PRN
Status: DISCONTINUED | OUTPATIENT
Start: 2023-01-04 | End: 2023-01-04

## 2023-01-04 RX ORDER — MAGNESIUM HYDROXIDE 1200 MG/15ML
LIQUID ORAL PRN
Status: DISCONTINUED | OUTPATIENT
Start: 2023-01-04 | End: 2023-01-04 | Stop reason: HOSPADM

## 2023-01-04 RX ORDER — ONDANSETRON 2 MG/ML
4 INJECTION INTRAMUSCULAR; INTRAVENOUS EVERY 6 HOURS PRN
Status: CANCELLED | OUTPATIENT
Start: 2023-01-04

## 2023-01-04 RX ORDER — ONDANSETRON 2 MG/ML
INJECTION INTRAMUSCULAR; INTRAVENOUS PRN
Status: DISCONTINUED | OUTPATIENT
Start: 2023-01-04 | End: 2023-01-04

## 2023-01-04 RX ORDER — PROPOFOL 10 MG/ML
INJECTION, EMULSION INTRAVENOUS CONTINUOUS PRN
Status: DISCONTINUED | OUTPATIENT
Start: 2023-01-04 | End: 2023-01-04

## 2023-01-04 RX ORDER — PROCHLORPERAZINE MALEATE 10 MG
10 TABLET ORAL EVERY 6 HOURS PRN
Status: CANCELLED | OUTPATIENT
Start: 2023-01-04

## 2023-01-04 RX ORDER — LIDOCAINE HYDROCHLORIDE 10 MG/ML
INJECTION, SOLUTION INFILTRATION; PERINEURAL PRN
Status: DISCONTINUED | OUTPATIENT
Start: 2023-01-04 | End: 2023-01-04

## 2023-01-04 RX ORDER — OXYCODONE HYDROCHLORIDE 5 MG/1
5 TABLET ORAL EVERY 4 HOURS PRN
Status: CANCELLED | OUTPATIENT
Start: 2023-01-04

## 2023-01-04 RX ORDER — LIDOCAINE 40 MG/G
CREAM TOPICAL
Status: DISCONTINUED | OUTPATIENT
Start: 2023-01-04 | End: 2023-01-04 | Stop reason: HOSPADM

## 2023-01-04 RX ORDER — IBUPROFEN 600 MG/1
600 TABLET, FILM COATED ORAL EVERY 6 HOURS PRN
Status: CANCELLED | OUTPATIENT
Start: 2023-01-04

## 2023-01-04 RX ADMIN — Medication 80 MG: at 08:17

## 2023-01-04 RX ADMIN — FENTANYL CITRATE 100 MCG: 50 INJECTION, SOLUTION INTRAMUSCULAR; INTRAVENOUS at 08:17

## 2023-01-04 RX ADMIN — SODIUM CHLORIDE, POTASSIUM CHLORIDE, SODIUM LACTATE AND CALCIUM CHLORIDE: 600; 310; 30; 20 INJECTION, SOLUTION INTRAVENOUS at 06:54

## 2023-01-04 RX ADMIN — MECLIZINE HYDROCHLORIDE 25 MG: 25 TABLET ORAL at 07:04

## 2023-01-04 RX ADMIN — ACETAMINOPHEN 975 MG: 325 TABLET, FILM COATED ORAL at 07:04

## 2023-01-04 RX ADMIN — Medication 30 MG: at 08:23

## 2023-01-04 RX ADMIN — LIDOCAINE HYDROCHLORIDE 50 MG: 10 INJECTION, SOLUTION INFILTRATION; PERINEURAL at 08:17

## 2023-01-04 RX ADMIN — HYDROMORPHONE HYDROCHLORIDE 0.5 MG: 1 INJECTION, SOLUTION INTRAMUSCULAR; INTRAVENOUS; SUBCUTANEOUS at 08:41

## 2023-01-04 RX ADMIN — ONDANSETRON HYDROCHLORIDE 4 MG: 2 INJECTION, SOLUTION INTRAVENOUS at 11:06

## 2023-01-04 RX ADMIN — PHENYLEPHRINE HYDROCHLORIDE 50 MCG: 10 INJECTION INTRAVENOUS at 09:03

## 2023-01-04 RX ADMIN — Medication 2 G: at 08:11

## 2023-01-04 RX ADMIN — MIDAZOLAM 2 MG: 1 INJECTION INTRAMUSCULAR; INTRAVENOUS at 08:11

## 2023-01-04 RX ADMIN — PROPOFOL 160 MG: 10 INJECTION, EMULSION INTRAVENOUS at 08:17

## 2023-01-04 RX ADMIN — HYDROMORPHONE HYDROCHLORIDE 0.5 MG: 1 INJECTION, SOLUTION INTRAMUSCULAR; INTRAVENOUS; SUBCUTANEOUS at 09:30

## 2023-01-04 RX ADMIN — DEXAMETHASONE SODIUM PHOSPHATE 8 MG: 4 INJECTION, SOLUTION INTRA-ARTICULAR; INTRALESIONAL; INTRAMUSCULAR; INTRAVENOUS; SOFT TISSUE at 08:17

## 2023-01-04 RX ADMIN — SODIUM CHLORIDE, POTASSIUM CHLORIDE, SODIUM LACTATE AND CALCIUM CHLORIDE: 600; 310; 30; 20 INJECTION, SOLUTION INTRAVENOUS at 13:26

## 2023-01-04 RX ADMIN — PROPOFOL 75 MCG/KG/MIN: 10 INJECTION, EMULSION INTRAVENOUS at 08:23

## 2023-01-04 RX ADMIN — PHENYLEPHRINE HYDROCHLORIDE 100 MCG: 10 INJECTION INTRAVENOUS at 09:04

## 2023-01-04 RX ADMIN — SODIUM CHLORIDE, POTASSIUM CHLORIDE, SODIUM LACTATE AND CALCIUM CHLORIDE: 600; 310; 30; 20 INJECTION, SOLUTION INTRAVENOUS at 09:39

## 2023-01-04 ASSESSMENT — ACTIVITIES OF DAILY LIVING (ADL)
ADLS_ACUITY_SCORE: 35

## 2023-01-04 ASSESSMENT — LIFESTYLE VARIABLES: TOBACCO_USE: 1

## 2023-01-04 NOTE — ANESTHESIA POSTPROCEDURE EVALUATION
Patient: Saloni Norton    Procedure: Procedure(s):  Left thyroid lobectomy and isthmusectomy       Anesthesia Type:  General    Note:  Disposition: Inpatient   Postop Pain Control: Uneventful            Sign Out: Well controlled pain   PONV: No   Neuro/Psych: Uneventful            Sign Out: Acceptable/Baseline neuro status   Airway/Respiratory: Uneventful            Sign Out: Acceptable/Baseline resp. status   CV/Hemodynamics: Uneventful            Sign Out: Acceptable CV status; No obvious hypovolemia; No obvious fluid overload   Other NRE: NONE   DID A NON-ROUTINE EVENT OCCUR? No           Last vitals:  Vitals Value Taken Time   /80 01/04/23 1205   Temp 98.4  F (36.9  C) 01/04/23 1110   Pulse 100 01/04/23 1218   Resp 14 01/04/23 1218   SpO2 95 % 01/04/23 1218   Vitals shown include unvalidated device data.    Electronically Signed By: Renetta Aguirre MD  January 4, 2023  12:19 PM

## 2023-01-04 NOTE — ANESTHESIA CARE TRANSFER NOTE
Patient: Saloni Norton    Procedure: Procedure(s):  Left thyroid lobectomy and isthmusectomy       Diagnosis: Thyroid nodule [E04.1]  Diagnosis Additional Information: No value filed.    Anesthesia Type:   General     Note:    Oropharynx: oropharynx clear of all foreign objects and spontaneously breathing  Level of Consciousness: awake and drowsy  Oxygen Supplementation: face mask  Level of Supplemental Oxygen (L/min / FiO2): 6  Independent Airway: airway patency satisfactory and stable  Dentition: dentition unchanged  Vital Signs Stable: post-procedure vital signs reviewed and stable  Report to RN Given: handoff report given  Patient transferred to: PACU  Comments: No issues, comfortable  Handoff Report: Identifed the Patient, Identified the Reponsible Provider, Reviewed the pertinent medical history, Discussed the surgical course, Reviewed Intra-OP anesthesia mangement and issues during anesthesia and Allowed opportunity for questions and acknowledgement of understanding      Vitals:  Vitals Value Taken Time   /98 01/04/23 1112   Temp 98.4  F (36.9  C) 01/04/23 1110   Pulse 95 01/04/23 1114   Resp 17 01/04/23 1114   SpO2 97 % 01/04/23 1114   Vitals shown include unvalidated device data.    Electronically Signed By: ESEQUIEL Camejo CRNA  January 4, 2023  11:15 AM

## 2023-01-04 NOTE — ANESTHESIA PREPROCEDURE EVALUATION
Anesthesia Pre-Procedure Evaluation    Patient: Saloni Norton   MRN: [id number] : 1975        Procedure : Procedure(s):  Left thyroid lobectomy and isthmusectomy, possible total thyroidectomy, possible central neck dissection          Past Medical History:   Diagnosis Date     Colon polyps      Diffuse cystic mastopathy     Fibrocystic breasts, asymmetric breast tissue     History of radiation exposure     [place] age 12 [facility] HI-RISK PREG-YOUNG MULTIP 2006     Thyroid nodule      Tobacco use disorder     quit       Past Surgical History:   Procedure Laterality Date     BIOPSY  2022     TONSILLECTOMY  1980    unilateral (right) tonsillectomy      No Known Allergies   Social History     Tobacco Use     Smoking status: Former     Packs/day: 0.50     Years: 10.00     Pack years: 5.00     Types: Cigarettes     Quit date: 2004     Years since quittin.6     Smokeless tobacco: Never     Tobacco comments:     total smoking about 5 pack years   Substance Use Topics     Alcohol use: Yes     Comment: less than monthly      Wt Readings from Last 1 Encounters:   23 86 kg (189 lb 8 oz)        Anesthesia Evaluation   Pt has had prior anesthetic. Type: MAC.    No history of anesthetic complications       ROS/MED HX  ENT/Pulmonary:     (+) tobacco use, Past use,     Neurologic:  - neg neurologic ROS     Cardiovascular:  - neg cardiovascular ROS     METS/Exercise Tolerance:     Hematologic:  - neg hematologic  ROS     Musculoskeletal:       GI/Hepatic:  - neg GI/hepatic ROS     Renal/Genitourinary:  - neg Renal ROS     Endo:     (+) thyroid problem, Obesity ( BMI 32),     Psychiatric/Substance Use:       Infectious Disease:       Malignancy: Comment: Two thyroid nodules, one suspicious for papillary carcinoma.       Other:            Physical Exam    Airway        Mallampati: II   TM distance: > 3 FB   Neck ROM: full   Mouth opening: > 3 cm    Respiratory Devices and Support          Dental  no notable dental history         Cardiovascular   cardiovascular exam normal          Pulmonary   pulmonary exam normal                OUTSIDE LABS:  CBC:   Lab Results   Component Value Date    WBC 7.7 12/27/2022    WBC 6.4 06/04/2022    HGB 14.2 12/27/2022    HGB 15.1 06/04/2022    HCT 41.8 12/27/2022    HCT 43.4 06/04/2022     12/27/2022     06/04/2022     BMP:   Lab Results   Component Value Date     12/27/2022     06/04/2022    POTASSIUM 4.1 12/27/2022    POTASSIUM 4.2 06/04/2022    CHLORIDE 103 12/27/2022    CHLORIDE 106 06/04/2022    CO2 25 12/27/2022    CO2 30 06/04/2022    BUN 17.9 12/27/2022    BUN 18 06/04/2022    CR 0.74 12/27/2022    CR 0.72 06/04/2022    GLC 98 12/27/2022    GLC 98 06/04/2022     COAGS: No results found for: PTT, INR, FIBR  POC:   Lab Results   Component Value Date    HCG Positive (A) 02/09/2006     HEPATIC:   Lab Results   Component Value Date    ALBUMIN 3.9 10/06/2011    PROTTOTAL 7.3 10/06/2011    ALT 9 10/06/2011    AST 16 10/06/2011    ALKPHOS 44 10/06/2011    BILITOTAL 0.4 10/06/2011     OTHER:   Lab Results   Component Value Date    A1C 5.2 06/04/2022    BRITNEY 9.1 12/27/2022    TSH 1.10 06/04/2022    T4 1.05 12/03/2004       Anesthesia Plan    ASA Status:  2   NPO Status:  NPO Appropriate    Anesthesia Type: General.     - Airway: ETT   Induction: Intravenous.   Maintenance: Balanced.   Techniques and Equipment:     - Airway: Video-Laryngoscope         Consents    Anesthesia Plan(s) and associated risks, benefits, and realistic alternatives discussed. Questions answered and patient/representative(s) expressed understanding.    - Discussed:     - Discussed with:  Patient      - Extended Intubation/Ventilatory Support Discussed: No.      - Patient is DNR/DNI Status: No    Use of blood products discussed: No .     Postoperative Care    Pain management: IV analgesics, Oral pain medications, Multi-modal analgesia.   PONV prophylaxis: Ondansetron  (or other 5HT-3), Meclizine or Dimenhydrinate, Dexamethasone or Solumedrol, Background Propofol Infusion     Comments:                Renetta Aguirre MD

## 2023-01-04 NOTE — OP NOTE
General Surgery Operative Note    PREOPERATIVE DIAGNOSIS:  Thyroid nodules [E04.1]    POSTOPERATIVE DIAGNOSIS:  Same    PROCEDURE:   Left thyroid lobectomy and isthmusectomy    ANESTHESIA:  General.    PREOPERATIVE MEDICATIONS:  Ancef 2 gm    SURGEON:  Charley Humphreys MD    ASSISTANT:  Carolyn Lagunas PA-C,  - the physician assistant was medically necessary in providing adequate exposure in the operating field, maintaining hemostasis, cutting suture, clamping and ligating blood vessels, and visualization of the anatomic structures throughout the surgical procedure.    ESTIMATED BLOOD LOSS:  30 ml    INDICATIONS:  Saloni Norton is a 47 year old female with two suspicious thyroid nodules. There is a 1.8 cm left thyroid nodule which has an HRAS mutation and affirma-estimated 75% risk of malignancy. She also has a 3.6 cm isthmus nodule which was biopsied as Atypia of Undetermined Significance, but Affirma suspicious, so a 50% risk of malignancy. We discussed proceeding with a left thyroid lobectomy and isthmusectomy, possible total thyroidectomy, possible central neck dissection.    PROCEDURE:    Saloni Norton was placed supine on the operating room table and general endotracheal anesthesia was induced. The patient was then placed in cervical extension and the neck was prepped and draped in our usual sterile fashion. After a confirmatory pause was performed, a cervical collar incision was made sharply and then carried down through the subcutaneous tissues and platysma with cautery. Superior and inferior flaps were raised. The strap muscles were divided in the midline. The strap muscles were elevated off the left thyroid lobe bluntly and middle thyroid vein was identified and divided with LigaSure. The carotid artery was exposed. The superior pole vessels were dissected and divided with Ligasure allowing the thyroid lobe to be rotated medially.  The superior parathyroid was identified and preserved on its  vascular pedicle.  The inferior parathyroid was identified on the thyroid capsule and was carefully dissected off. Deep to this, we identified the recurrent laryngeal nerve in its typical location.  Its function was confirmed using a nerve monitor. With the nerve in view, we ligated and divided the inferior pole vessels.  We followed the nerve cephalad, carefully  the thyroid away from it.    The thyroid was dissected off the trachea at the ligament of Castillo. While dissecting the fibrous/vascular tissue at the ligament of Berry, we tested the nerve and no longer had a strong signal on the NIM monitor (proximal portion declined from ~800 to ~40 uV).  We proceeded to remove the left thyroid lobe and isthmus off the trachea, but divided the thyroid to the right of the isthmus nodule and did not proceed with right thyroid lobectomy due to the loss of left RLN signal.  The left superior pole was marked with a suture and the specimen was passed off to pathology for review.  Frozen section confirmed two follicular lesions (2 cm and 4 cm) without papillary features.  Palpation of the left central neck was grossly normal aside from one small, though somewhat firm node that would have been adjacent to the lower pole of the thyroid. Because of its firm texture, this was removed and sent for frozen pathology, but was beingn.  At conclusion of the procedure, there was still lack of robust recurrent laryngeal nerve signal on the left. We irrigated with saline and ensured hemostasis with valsalva.  Fibrillar hemostatic agent was placed in the operative bed   The midline fascia and platysma were closed with interrupted 3-0 vicryl suture and the skin was closed with running 4-0 subcuticular Monocryl. A dry sterile dressing was placed and the patient was extubated and transferred to recovery in good condition.      INTRAOPERATIVE FINDINGS:    1.  Thyroid follicular lesions x 2 by frozen section  2.   Left recurrent  laryngeal nerve seen. It was visibly intact, but its function was lost on nerve monitor with a transition at the ligament of Castillo, suspect stretch vs. thermal injury.  3.  Left superior and inferior parathyroid glands seen and preserved.  4. Small, firm left paratracheal lymph node sent for frozen section and was benign.     Specimens:   ID Type Source Tests Collected by Time Destination   1 : Left thyroid lobe and isthmus Tissue Thyroid, left SURGICAL PATHOLOGY EXAM Charley Humphreys MD 1/4/2023 10:09 AM    2 : Left paratracheal node Tissue Lymph Node(s), Paratracheal, Left SURGICAL PATHOLOGY EXAM Charley Humphreys MD 1/4/2023 10:29 AM        Charley Humphreys MD

## 2023-01-04 NOTE — DISCHARGE INSTRUCTIONS
GENERAL ANESTHESIA OR SEDATION ADULT DISCHARGE INSTRUCTIONS   SPECIAL PRECAUTIONS FOR 24 HOURS AFTER SURGERY    IT IS NOT UNUSUAL TO FEEL LIGHT-HEADED OR FAINT, UP TO 24 HOURS AFTER SURGERY OR WHILE TAKING PAIN MEDICATION.  IF YOU HAVE THESE SYMPTOMS; SIT FOR A FEW MINUTES BEFORE STANDING AND HAVE SOMEONE ASSIST YOU WHEN YOU GET UP TO WALK OR USE THE BATHROOM.    YOU SHOULD REST AND RELAX FOR THE NEXT 24 HOURS AND YOU MUST MAKE ARRANGEMENTS TO HAVE SOMEONE STAY WITH YOU FOR AT LEAST 24 HOURS AFTER YOUR DISCHARGE.  AVOID HAZARDOUS AND STRENUOUS ACTIVITIES.  DO NOT MAKE IMPORTANT DECISIONS FOR 24 HOURS.    DO NOT DRIVE ANY VEHICLE OR OPERATE MECHANICAL EQUIPMENT FOR 24 HOURS FOLLOWING THE END OF YOUR SURGERY.  EVEN THOUGH YOU MAY FEEL NORMAL, YOUR REACTIONS MAY BE AFFECTED BY THE MEDICATION YOU HAVE RECEIVED.    DO NOT DRINK ALCOHOLIC BEVERAGES FOR 24 HOURS FOLLOWING YOUR SURGERY.    DRINK CLEAR LIQUIDS (APPLE JUICE, GINGER ALE, 7-UP, BROTH, ETC.).  PROGRESS TO YOUR REGULAR DIET AS YOU FEEL ABLE.    YOU MAY HAVE A DRY MOUTH, A SORE THROAT, MUSCLES ACHES OR TROUBLE SLEEPING.  THESE SHOULD GO AWAY AFTER 24 HOURS.    CALL YOUR DOCTOR FOR ANY OF THE FOLLOWING:  SIGNS OF INFECTION (FEVER, GROWING TENDERNESS AT THE SURGERY SITE, A LARGE AMOUNT OF DRAINAGE OR BLEEDING, SEVERE PAIN, FOUL-SMELLING DRAINAGE, REDNESS OR SWELLING.    IT HAS BEEN OVER 8 TO 10 HOURS SINCE SURGERY AND YOU ARE STILL NOT ABLE TO URINATE (PASS WATER).      Maximum acetaminophen (Tylenol) dose from all sources should not exceed 4 grams (4000 mg) per day. You received a dose at 7am, next dose can be taken at 1pm.

## 2023-01-12 LAB
PATH REPORT.COMMENTS IMP SPEC: ABNORMAL
PATH REPORT.COMMENTS IMP SPEC: YES
PATH REPORT.FINAL DX SPEC: ABNORMAL
PATH REPORT.GROSS SPEC: ABNORMAL
PATH REPORT.INTRAOP OBS SPEC DOC: ABNORMAL
PATH REPORT.MICROSCOPIC SPEC OTHER STN: ABNORMAL
PATH REPORT.RELEVANT HX SPEC: ABNORMAL
PATHOLOGY SYNOPTIC REPORT: ABNORMAL
PHOTO IMAGE: ABNORMAL

## 2023-01-19 ENCOUNTER — OFFICE VISIT (OUTPATIENT)
Dept: SURGERY | Facility: CLINIC | Age: 48
End: 2023-01-19
Payer: COMMERCIAL

## 2023-01-19 VITALS
DIASTOLIC BLOOD PRESSURE: 84 MMHG | OXYGEN SATURATION: 98 % | HEART RATE: 85 BPM | HEIGHT: 65 IN | SYSTOLIC BLOOD PRESSURE: 128 MMHG | WEIGHT: 189 LBS | BODY MASS INDEX: 31.49 KG/M2

## 2023-01-19 DIAGNOSIS — E04.1 THYROID NODULE: Primary | ICD-10-CM

## 2023-01-19 PROCEDURE — 99024 POSTOP FOLLOW-UP VISIT: CPT | Performed by: SURGERY

## 2023-01-20 ENCOUNTER — TELEPHONE (OUTPATIENT)
Dept: OTOLARYNGOLOGY | Facility: CLINIC | Age: 48
End: 2023-01-20
Payer: COMMERCIAL

## 2023-01-20 NOTE — TELEPHONE ENCOUNTER
Left vm message for patient in regards to possibly scheduling a sooner appointment. Writers call back number provided on vm.

## 2023-01-20 NOTE — TELEPHONE ENCOUNTER
[facility] Call Center    Phone Message    May a detailed message be left on voicemail: yes     Reason for Call: Symptoms or Concerns     If patient has red-flag symptoms, warm transfer to triage line    Current symptom or concern: Patient scheduled 1st available per protocols, she did receive an urgent referral.     Please review to see if she can be seen sooner.       Action Taken: Other: ENT    Travel Screening: Not Applicable

## 2023-01-20 NOTE — PROGRESS NOTES
"Progress Note/Post-op Follow up:  Saloni is here for follow up after left thyroid lobectomy and isthmusectomy 2 weeks ago. She reports good energy and no pain. 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  The nerve remains physically intact, but suffered either a stretch or thermal injury and stopped conducting. As such, she has a hoarse voice. She says that her voice strength fluctuates throughout the day. She feels the need to clear her throat often now.     Physical Exam:  /84   Pulse 85   Ht 1.651 m (5' 5\")   Wt 85.7 kg (189 lb)   LMP 12/24/2022 (Exact Date)   SpO2 98%   BMI 31.45 kg/m    General:  Appears well, in no acute distress  HEENT:  Chvostek's sign is not done.   Neck:  Incision site healing nicely, Healing ridge is minimal.             Range of motion is normal.  Neuro:  Voice is weak/breathy. Frequent throat clearing.     Pathology:  Pathology was reviewed with the patient.   A.  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  Lymph node, left paratracheal: Excision:  - One lymph node, negative for metastatic carcinoma (0/1)      Assessment and Plan:  Saloni is recovering after left thyroid lobectomy and isthmusectomy. 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 Typically, I would recommend proceeding with completion thyroidectomy with this pathology so that she would have the option of radioactive iodine and for ease of future surveillance. However, with her left vocal cord paralysis, I recommend not " proceeding with any right-sided thyroid surgery unless nerve function returns.    We discussed the nature of her RLN injury and how stretch/thermal injuries can resolve with time, but that it might take months. There is also a chance that this vocal cord paralysis will be permanent. I would like her to consult with a laryngologist who can assess her vocal cord function over time and provide therapeutic options to help her regain some voice strength.  We have briefly discussed the procedure of vocal cord injection.  I have also placed a referral to speech therapy.      If her left RLN regains function, we should consider completion thyroidectomy for her follicular thyroid cancer. If her RLN laryngeal nerve does not regain function then our options would be simply observation, completion thyroidectomy despite the high surgical risk (not recommended), or I131 ablation of the remaining lobe. While not standard treatment for differentiated thyroid cancer, lobar ablation with I131 has been described in small studies and is feasible in patients with small thyroid lobes without nodules, such as Ms. Norton. At this point, we need to give the nerve time to declare itself.     She needs her TSH checked in 4-6 weeks to assess for post-operative hypothyroidism. She is currently clinically euthyroid and feeling well. She has asked for a referral for another endocrinologist closer to home and this referral has been made.  I will follow up with her by phone when her TSH has returned to discuss next steps. I will touch base with her endocrinologist at that time to discuss the options mentioned above. We will plan for follow up after she has met with the laryngologist as well.    Charley Humphreys MD  Please route or send letter to:  Primary Care Provider (PCP), Referring Provider and Andreina Clarke MD

## 2023-01-23 NOTE — TELEPHONE ENCOUNTER
FUTURE VISIT INFORMATION      FUTURE VISIT INFORMATION:    Date: 2/24/23    Time: 4pm    [place] [facility] [place]:    Referring provider:   Charley Humphreys    Referring providers clinic:  [facility]     Reason for visit/diagnosis  Per Pt, dx Laryngology, vocal cord paralysis. referral from iram Fuller in epic - referral is not for her thyroid    RECORDS REQUESTED FROM:       Clinic name Comments Records Status Imaging Status   [facility] 1/19/23- note with Charley Humphreys MD  1/4/23- surgery  Epic     Imaging  12/14/22- [facility]   11/23/22- [facility] biopsy thyroid   11/23/22- [facility] Thyroid  Epic  PACs    [facility]  2/28/18- note Cruzito Baumann MD  2/24/18- note with Jayden Eldridge MD  12/27/16- note with ruth Eric  12/12/16- note with Kerrie martinez

## 2023-02-08 NOTE — PROGRESS NOTES
Video-Visit Details  Type of service:  Video Visit  Originating Location (pt. Location): Home  Distant Location (provider location):  Off-site  Mode of Communication:  Video Conference via [employer]    2/9/2023    Referring Provider: Aminah Navarro MD    Presenting Information:   I met with Saloni Norton today for her video genetic counseling visit through the Cancer Risk Management Program to discuss her personal history of thyroid cancer and family history of breast cancer. She is here today to review this history, cancer screening recommendations, and available genetic testing options.    Personal History:  Saloni is a 47 year old female. She was recently diagnosed with thyroid cancer. Saloni underwent a thyroidectomy in January 2023. Pathology revealed follicular thyroid carcinoma.      In her hormonal-based medical history, she had her first menstrual period at age 14, her first child at age 26, and is premenopausal. Saloni has her ovaries, fallopian tubes and uterus in place, and reports no ovarian cancer screening to date. She reports no history of hormone replacement therapy or oral contraceptive use.     Saloni has annual clinical breast exams and mammograms; her most recent mammogram in December 2022 was normal. Saloni began having colonoscopies at the age of 43. Her first colonoscopy found one 11-15 mm sessile serrated adenoma in the transverse colon and one 4-5 mm sessile serrated adenoma and one 4-5 mm hyperplastic polyp in the sigmoid colon. Saloni's most recent colonoscopy in October 2021 found one 6 mm sessile serrated adenoma in the transverse colon. Follow-up was recommended in 3 years. Saloni denies any history of dermatological exams. She does not regularly do any other cancer screening at this time. Saloni reported smoking for approximately 12 years and no current alcohol use.    Family History: (Please see scanned pedigree for detailed family history information)    Saloni's mother was  diagnosed with skin cancer at age 80. She is currently 83.     Maternal aunt was diagnosed with breast cancer in her 80's. She passed away at age 92.     Her daughter (Saloni's first cousin) was diagnosed with lymphoma at age 53. She was later diagnosed with breast caner at age 58. It was reported that she had negative genetic testing. However, her report was unavailable for review. She is currently 60.     Maternal grandmother was diagnosed with breast cancer at age 56. She passed away at age 70.     Saloni's father was found to have between 2-5 colon polyps. He passed away at age 75.     Of note, there is no history of cancer on her paternal side of her family.     Her maternal and paternal ethnicity is Trin[place]. There is known Ashkenazi Shinto ancestry on both sides of her family. There is no reported consanguinity.    Discussion:  Saloni's personal history of thyroid cancer and family history of breast cancer is suggestive of a hereditary cancer syndrome.  We reviewed the features of sporadic, familial, and hereditary cancers. We discussed the natural history and genetics of several hereditary cancer syndromes, including Hereditary Breast and Ovarian Cancer Syndrome (HBOC) and Cowden syndrome.   The most common cause of hereditary breast and ovarian cancer is HBOC, which is caused by mutations in the BRCA1 and BRCA2 genes. Individuals with HBOC syndrome are at increased risk for several different cancers, including breast, ovarian, male breast, prostate, melanoma, and pancreatic cancer. HBOC typically presents with multiple family members diagnosed with breast cancer before age 50 and/or ovarian cancer.   Cowden syndrome is a condition caused by mutations in the PTEN gene. Individuals with Cowden syndrome are at increased risk for breast, thyroid, endometrial, colon, and kidney cancer.  Other features seen in some individuals with Cowden syndrome include benign skin lesions (facial papules, keratoses, lipomas),  learning disability, autism, thyroid nodules, colon polyps, and larger head size.    A detailed handout regarding information about HBOC, Cowden syndrome, and related hereditary cancer syndromes will be provided to Saloni via [facility] and can be found in the after visit summary. Topics included: inheritance pattern, cancer risks, cancer screening recommendations, and also risks, benefits and limitations of testing.  In looking at Saloni's personal and family history, it is possible that a cancer susceptibility gene is present due to her maternal aunt diagnosed with breast cancer in her 80's, maternal first cousin diagnosed with breast cancer at age 58, and her maternal grandmother diagnosed with breast cancer at age 56.  Based on her personal and family history, Saloni meets current National Comprehensive Cancer Network (NCCN) criteria for genetic testing of high-penetrance breast cancer susceptibility genes (including BRCA1, BRCA2, CDH1, PALB2, PTEN, and TP53).    We discussed that there are additional genes that could cause increased risk for breast cancer. As many of these genes present with overlapping features in a family and accurate cancer risk cannot always be established based upon the pedigree analysis alone, it would be reasonable for Saloni to consider panel genetic testing to analyze multiple genes at once.  We reviewed genetic testing options for Saloni based on her personal and family history: a panel of genes associated with an increased risk for hereditary thyroid, breast, and gynecologic cancers, or larger panel options to include genes associated with increased risk for multiple different cancer types. Saloni expressed interest in the BRCA1 and BRCA2 genes with automatic reflex to the Common Hereditary Cancers panel through [facility].   Genetic testing is available for 47 genes associated with cancers of the breast, ovary, uterus, prostate and gastrointestinal system: [facility] Common  Hereditary Cancers panel (APC, JATINDER, AXIN2, BARD1, BMPR1A, BRCA1, BRCA2, BRIP1, CDH1, CDK4, CDKN2A, CHEK2, CTNNA1, DICER1, EPCAM, GREM1, HOXB13, KIT, MEN1, MLH1, MSH2, MSH3, MSH6, MUTYH, NBN, NF1, NTHL1, PALB2, PDGFRA, PMS2, POLD1, POLE, PTEN,RAD50, RAD51C, RAD51D, SDHA, SDHB, SDHC, SDHD, SMAD4, SMARCA4, STK11, TP53,TSC1, TSC2, VHL).    We discussed that many of these genes are associated with specific hereditary cancer syndromes and published management guidelines: Hereditary Breast and Ovarian Cancer syndrome (BRCA1, BRCA2), Soares syndrome (MLH1, MSH2, MSH6, PMS2, EPCAM), Familial Adenomatous Polyposis (APC), Hereditary Diffuse Gastric Cancer (CDH1), Familial Atypical Multiple Mole Melanoma syndrome (CDK4, CDKN2A), Multiple Endocrine Neoplasia type 1 (MEN1), Juvenile Polyposis syndrome (BMPR1A, SMAD4), Cowden syndrome (PTEN), Li Fraumeni syndrome (TP53), Hereditary Paraganglioma and Pheochromocytoma syndrome (SDHA, SDHB, SDHC, SDHD), Peutz-Jeghers syndrome (STK11), MUTYH Associated Polyposis (MUTYH), Tuberous sclerosis complex (TSC1, TSC2), Von Hippel-Lindau disease (VHL), and Neurofibromatosis type 1 (NF1).   The JATINDER, AXIN2, BRIP1, CHEK2, GREM1, MSH3, NBN, NTHL1, PALB2, POLD1, POLE, RAD51C, and RAD51D genes are associated with increased cancer risk and have published management guidelines for certain cancers.   The remaining genes (BARD1, CTNNA1, DICER1, HOXB13, KIT, PDGFRA, RAD50, and SMARCA4) are associated with increased cancer risk and may allow us to make medical recommendations when mutations are identified.   Saloni would like to submit a blood sample for her genetic testing. She will go to her nearest [facility] at her earliest convenience to get her blood drawn for her genetic testing.   Verbal consent was given over video and written on the consent form. Turnaround time is approximately 3-4 weeks once the lab receives the sample.    Medical Management: For Saloni, we reviewed that the  information from genetic testing may determine:    additional cancer screening for which Saloni may qualify (i.e. mammogram and breast MRI, more frequent colonoscopies, more frequent dermatologic exams, etc.),    options for risk reducing surgeries Saloni could consider (i.e. bilateral mastectomy, surgery to remove her ovaries and/or uterus, etc.),      and targeted chemotherapies if she were to develop certain cancers in the future (i.e. immunotherapy for individuals with Soares syndrome, PARP inhibitors, etc.).     These recommendations and possible targeted chemotherapies will be discussed in detail once genetic testing is completed.     Plan:  1) Today Saloni elected to proceed with the BRCA1 and BRCA2 genes with automatic reflex to the Common Hereditary Cancers panel through [facility]. Therefore, consent was reviewed and verbally obtained for this testing.   2) Saloni plans to schedule her blood draw appointment at a clinic that is convenient to her.   3) The results should be available in 3-4 weeks after her blood is drawn.   4) Saloni will either have a telephone visit or video visit to discuss the results.  Additional recommendations about screening will be made at that time.    Saloni is 47 year old and is being evaluated via a billable video visit.    Time spent on video: 37 minutes    Christa Bahena MS, [facility]  Licensed, Certified Genetic Counselor  Phone: [phone]

## 2023-02-09 ENCOUNTER — VIRTUAL VISIT (OUTPATIENT)
Dept: ONCOLOGY | Facility: CLINIC | Age: 48
End: 2023-02-09
Payer: COMMERCIAL

## 2023-02-09 DIAGNOSIS — Z80.8 FAMILY HISTORY OF SKIN CANCER: ICD-10-CM

## 2023-02-09 DIAGNOSIS — C73 THYROID CANCER (H): Primary | ICD-10-CM

## 2023-02-09 DIAGNOSIS — Z80.3 FAMILY HISTORY OF BREAST CANCER: ICD-10-CM

## 2023-02-09 DIAGNOSIS — Z80.7 FAMILY HISTORY OF LYMPHOMA: ICD-10-CM

## 2023-02-09 PROCEDURE — 96040 HC GENETIC COUNSELING, EACH 30 MINUTES: CPT | Mod: GT,95

## 2023-02-09 NOTE — LETTER
Cancer Risk Management  Program Locations    [facility]  [facility]  [facility]  [facility]  Mailing Address  Cancer Risk Management Program  [address]  [place] [address]    New patient appointments  [phone]  February 9, 2023    aSloni Norton  [id number] [address] [facility] [id number]    Dear Saloni,    It was a pleasure talking with you via your virtual genetic counseling visit on 2/9/2023.  Below is a copy of the progress note from that recent visit through the Cancer Risk Management Program.  If you have any additional questions, please feel free to contact me.    Referring Provider: Aminah Navarro MD    Presenting Information:   I met with Saloni Cierra today for her video genetic counseling visit through the Cancer Risk Management Program to discuss her personal history of thyroid cancer and family history of breast cancer. She is here today to review this history, cancer screening recommendations, and available genetic testing options.    Personal History:  Saloni is a 47 year old female. She was recently diagnosed with thyroid cancer. Saloni underwent a thyroidectomy in January 2023. Pathology revealed follicular thyroid carcinoma.      In her hormonal-based medical history, she had her first menstrual period at age 14, her first child at age 26, and is premenopausal. Saloni has her ovaries, fallopian tubes and uterus in place, and reports no ovarian cancer screening to date. She reports no history of hormone replacement therapy or oral contraceptive use.     Saloni has annual clinical breast exams and mammograms; her most recent mammogram in December 2022 was normal. Saloni began having colonoscopies at the age of 43. Her first colonoscopy found one 11-15 mm sessile serrated adenoma in the transverse colon and one  4-5 mm sessile serrated adenoma and one 4-5 mm hyperplastic polyp in the sigmoid colon. Saloni's most recent colonoscopy in October 2021 found one 6 mm sessile serrated adenoma in the transverse colon. Follow-up was recommended in 3 years. Saloni denies any history of dermatological exams. She does not regularly do any other cancer screening at this time. Saloni reported smoking for approximately 12 years and no current alcohol use.    Family History: (Please see scanned pedigree for detailed family history information)    Saloni's mother was diagnosed with skin cancer at age 80. She is currently 83.     Maternal aunt was diagnosed with breast cancer in her 80's. She passed away at age 92.     Her daughter (Saloni's first cousin) was diagnosed with lymphoma at age 53. She was later diagnosed with breast caner at age 58. It was reported that she had negative genetic testing. However, her report was unavailable for review. She is currently 60.     Maternal grandmother was diagnosed with breast cancer at age 56. She passed away at age 70.     Saloni's father was found to have between 2-5 colon polyps. He passed away at age 75.     Of note, there is no history of cancer on her paternal side of her family.     Her maternal and paternal ethnicity is Martiniquais. There is known Ashkenazi Yazdanism ancestry on both sides of her family. There is no reported consanguinity.    Discussion:  Saloni's personal history of thyroid cancer and family history of breast cancer is suggestive of a hereditary cancer syndrome.  We reviewed the features of sporadic, familial, and hereditary cancers. We discussed the natural history and genetics of several hereditary cancer syndromes, including Hereditary Breast and Ovarian Cancer Syndrome (HBOC) and Cowden syndrome.   The most common cause of hereditary breast and ovarian cancer is HBOC, which is caused by mutations in the BRCA1 and BRCA2 genes. Individuals with HBOC syndrome are at increased risk  for several different cancers, including breast, ovarian, male breast, prostate, melanoma, and pancreatic cancer. HBOC typically presents with multiple family members diagnosed with breast cancer before age 50 and/or ovarian cancer.   Cowden syndrome is a condition caused by mutations in the PTEN gene. Individuals with Cowden syndrome are at increased risk for breast, thyroid, endometrial, colon, and kidney cancer.  Other features seen in some individuals with Cowden syndrome include benign skin lesions (facial papules, keratoses, lipomas), learning disability, autism, thyroid nodules, colon polyps, and larger head size.    A detailed handout regarding information about HBOC, Cowden syndrome, and related hereditary cancer syndromes will be provided to Saloni via [facility] and can be found in the after visit summary. Topics included: inheritance pattern, cancer risks, cancer screening recommendations, and also risks, benefits and limitations of testing.  In looking at Saloni's personal and family history, it is possible that a cancer susceptibility gene is present due to her maternal aunt diagnosed with breast cancer in her 80's, maternal first cousin diagnosed with breast cancer at age 58, and her maternal grandmother diagnosed with breast cancer at age 56.  Based on her personal and family history, Saloni meets current National Comprehensive Cancer Network (NCCN) criteria for genetic testing of high-penetrance breast cancer susceptibility genes (including BRCA1, BRCA2, CDH1, PALB2, PTEN, and TP53).    We discussed that there are additional genes that could cause increased risk for breast cancer. As many of these genes present with overlapping features in a family and accurate cancer risk cannot always be established based upon the pedigree analysis alone, it would be reasonable for Saloni to consider panel genetic testing to analyze multiple genes at once.  We reviewed genetic testing options for Saloni based on her  personal and family history: a panel of genes associated with an increased risk for hereditary thyroid, breast, and gynecologic cancers, or larger panel options to include genes associated with increased risk for multiple different cancer types. Saloni expressed interest in the BRCA1 and BRCA2 genes with automatic reflex to the Common Hereditary Cancers panel through [facility].   Genetic testing is available for 47 genes associated with cancers of the breast, ovary, uterus, prostate and gastrointestinal system: [facility] Common Hereditary Cancers panel (APC, JATINDER, AXIN2, BARD1, BMPR1A, BRCA1, BRCA2, BRIP1, CDH1, CDK4, CDKN2A, CHEK2, CTNNA1, DICER1, EPCAM, GREM1, HOXB13, KIT, MEN1, MLH1, MSH2, MSH3, MSH6, MUTYH, NBN, NF1, NTHL1, PALB2, PDGFRA, PMS2, POLD1, POLE, PTEN,RAD50, RAD51C, RAD51D, SDHA, SDHB, SDHC, SDHD, SMAD4, SMARCA4, STK11, TP53,TSC1, TSC2, VHL).    We discussed that many of these genes are associated with specific hereditary cancer syndromes and published management guidelines: Hereditary Breast and Ovarian Cancer syndrome (BRCA1, BRCA2), Soares syndrome (MLH1, MSH2, MSH6, PMS2, EPCAM), Familial Adenomatous Polyposis (APC), Hereditary Diffuse Gastric Cancer (CDH1), Familial Atypical Multiple Mole Melanoma syndrome (CDK4, CDKN2A), Multiple Endocrine Neoplasia type 1 (MEN1), Juvenile Polyposis syndrome (BMPR1A, SMAD4), Cowden syndrome (PTEN), Li Fraumeni syndrome (TP53), Hereditary Paraganglioma and Pheochromocytoma syndrome (SDHA, SDHB, SDHC, SDHD), Peutz-Jeghers syndrome (STK11), MUTYH Associated Polyposis (MUTYH), Tuberous sclerosis complex (TSC1, TSC2), Von Hippel-Lindau disease (VHL), and Neurofibromatosis type 1 (NF1).   The JATINDER, AXIN2, BRIP1, CHEK2, GREM1, MSH3, NBN, NTHL1, PALB2, POLD1, POLE, RAD51C, and RAD51D genes are associated with increased cancer risk and have published management guidelines for certain cancers.   The remaining genes (BARD1, CTNNA1, DICER1, HOXB13, KIT, PDGFRA, RAD50,  and SMARCA4) are associated with increased cancer risk and may allow us to make medical recommendations when mutations are identified.   Saloni would like to submit a blood sample for her genetic testing. She will go to her nearest [facility] at her earliest convenience to get her blood drawn for her genetic testing.   Verbal consent was given over video and written on the consent form. Turnaround time is approximately 3-4 weeks once the lab receives the sample.    Medical Management: For Saloni, we reviewed that the information from genetic testing may determine:    additional cancer screening for which Saloni may qualify (i.e. mammogram and breast MRI, more frequent colonoscopies, more frequent dermatologic exams, etc.),    options for risk reducing surgeries Saloni could consider (i.e. bilateral mastectomy, surgery to remove her ovaries and/or uterus, etc.),      and targeted chemotherapies if she were to develop certain cancers in the future (i.e. immunotherapy for individuals with Soares syndrome, PARP inhibitors, etc.).     These recommendations and possible targeted chemotherapies will be discussed in detail once genetic testing is completed.     Plan:  1) Today Saloni elected to proceed with the BRCA1 and BRCA2 genes with automatic reflex to the Common Hereditary Cancers panel through [facility]. Therefore, consent was reviewed and verbally obtained for this testing.   2) Saloni plans to schedule her blood draw appointment at a clinic that is convenient to her.   3) The results should be available in 3-4 weeks after her blood is drawn.   4) Saloni will either have a telephone visit or video visit to discuss the results.  Additional recommendations about screening will be made at that time.    Saloni is 47 year old and is being evaluated via a billable video visit.    Time spent on video: 37 minutes    Christa Bahena MS, [facility]  Licensed, Certified Genetic Counselor  Phone: [phone]

## 2023-02-10 NOTE — PATIENT INSTRUCTIONS
Assessing Cancer Risk  Cancer is a common diagnosis which impacts many families.  Individuals may develop cancer due to environmental factors (such as exposures and lifestyle), aging, genetic predisposition, or a combination of these factors.      Only about 5-10% of cancers are thought to be due to an inherited cancer susceptibility gene.    These families often have:  Several people with the same or related types of cancer  Cancers diagnosed at a young age (before age 50)  Individuals with more than one primary cancer  Multiple generations of the family affected with cancer    Comprehensive Breast and Gynecologic Cancer Panel  We each inherit two copies of every gene in our bodies: one from our mother, and one from our father. Each gene has a specific job to do.  When a gene has a mistake or  mutation  in it, it does not work like it should.     Some people may be candidates for genetic testing of more than one gene.  For these families, genetic testing using a cancer panel may be offered. These panels will test different genes at once known to increase the risk for breast, ovarian, uterine, and/or other cancers.    This handout will review common hereditary breast and gynecologic cancer syndromes. The genes that will be discussed in this handout are: JATINDER, BRCA1, BRCA2, BRIP1, CDH1, CHEK2, MLH1, MSH2, MSH6, PMS2, EPCAM, PTEN, PALB2, RAD51C, RAD51D, and TP53.    The purpose of this handout is to serve as a brief summary of the breast and gynecologic cancer risk genes that have published clinical management guidelines for individuals who are found to carry a mutation. Inheriting a mutation does not mean a person will develop cancer, but it does significantly increase their risk above the general population risk.     ______________________________________________________________________________    Hereditary Breast and Ovarian Cancer Syndrome (BRCA1 and BRCA2)  A single mutation in one of the copies of BRCA1 or  BRCA2 increases the risk for breast and ovarian cancer, among others.  The risk for pancreatic cancer and melanoma may also be slightly increased in some families.  The chart below shows the chance that someone with a BRCA mutation would develop cancer in his or her lifetime1,2,3,4.       Lifetime Cancer Risks    General Population BRCA1  BRCA2   Breast  12% >60% >60%   Ovarian  1-2% 39-58% 13-29%   Prostate 12% 7-26% 19-61%   Male Breast 0.1% 0.2-1.2% 1.8-7.1%   Pancreas 1-2% Up to 5% 5-10%     A person s ethnic background is also important to consider, as individuals of Ashkenazi Mosque ancestry have a higher chance of having a BRCA gene mutation.  There are three BRCA mutations that occur more frequently in this population.      Soares Syndrome (MLH1, MSH2, MSH6, PMS2, and EPCAM)  Currently five genes are known to cause Soares Syndrome: MLH1, MSH2, MSH6, PMS2, and EPCAM.  A single mutation in one of the Soares Syndrome genes increases the risk for colon, endometrial, ovarian, and stomach cancers.  Other cancers that occur less commonly in Soares Syndrome include urinary tract, skin, and brain cancers.  The chart below shows the chance that a person with Soares syndrome would develop cancer in his or her lifetime5.      Lifetime Cancer Risks    General Population Soares Syndrome   Colon 5% 10-61%   Endometrial 3% 13-57%   Ovarian 1-2% 1-38%   Stomach <1% 1-9%   *Cancer risk varies depending on Soares syndrome gene found      Cowden Syndrome (PTEN)  Cowden syndrome is a hereditary condition that increases the risk for breast, thyroid, endometrial, colon, and kidney cancer.  Cowden syndrome is caused by a mutation in the PTEN gene.  A single mutation in one of the copies of PTEN causes Cowden syndrome and increases cancer risk.  The chart below shows the chance that someone with a PTEN mutation would develop cancer in their lifetime6,7.  Other benign features seen in some individuals with Cowden syndrome include benign  skin lesions (facial papules, keratoses, lipomas), learning disability, autism, thyroid nodules, colon polyps, and larger head size.     Lifetime Cancer Risks    General Population Cowden   Breast 12% 40-60%*   Thyroid 1% Up to 38%   Renal 1-2% Up to 35%   Endometrial 3% Up to 28%   Colon 5% Up to 9%   Melanoma 2-3% Up to 6%   *Emerging data suggests the risk for breast cancer could be greater than 60%               Li-Fraumeni Syndrome (TP53)  Li-Fraumeni Syndrome (LFS) is a cancer predisposition syndrome caused by a mutation in the TP53 gene. A single mutation in one of the copies of TP53 increases the risk for multiple cancers. Individuals with LFS are at an increased risk for developing cancer at a young age. The lifetime risk for development of a LFS-associated cancer is 50% by age 30 and 90% by age 60.   Core Cancers: Sarcomas, Breast, Brain, Lung, Leukemias/Lymphomas, Adrenocortical carcinomas  Other Cancers: Gastrointestinal, Thyroid, Skin, Genitourinary       Hereditary Diffuse Gastric Cancer (CDH1)  Currently, one gene is known to cause hereditary diffuse gastric cancer (HDGC): CDH1.  Individuals with HDGC are at increased risk for diffuse gastric cancer and lobular breast cancer. Of people diagnosed with HDGC, 30-50% have a mutation in the CDH1 gene.  This suggests there are likely other genes that may cause HDGC that have not been identified yet.      Lifetime Cancer Risks    General Population HDGC   Diffuse Gastric  <1% ~80%   Breast 12% 41-60%       Additional Genes    JATINDER  JATINDER is a moderate-risk breast cancer gene. Women who have a mutation in JATINDER can have between a 2-4 fold increased risk for breast cancer compared to the general population8. JATINDER mutations have also been associated with increased risk for pancreatic cancer between 5-10%9. Individuals who inherit two JATINDRE mutations have a condition called ataxia-telangiectasia (AT).  This rare autosomal recessive condition affects the nervous system  and immune system, and is associated with progressive cerebellar ataxia beginning in childhood. Individuals with ataxia-telangiectasia often have a weakened immune system and have an increased risk for childhood cancers.    PALB2  Mutations in PALB2 have been shown to increase the risk of breast cancer up to 41-60% in some families; where individuals fall within this risk range is dependent upon family [id number]. PALB2 mutations have also been associated with increased risk for pancreatic cancer between 5-10%.  Individuals who inherit two PALB2 mutations--one from their mother and one from their father--have a condition called Fanconi Anemia.  This rare autosomal recessive condition is associated with short stature, developmental delay, bone marrow failure, and increased risk for childhood cancers.    CHEK2   CHEK2 is a moderate-risk breast cancer gene.  Women who have a mutation in CHEK2 have around a 2-4 fold increased risk for breast cancer compared to the general population, and this risk may be higher depending upon family history.11,12,13 The risk of colon cancer may be twice as high as the general population risk of colon cancer of 5%. Mutations in CHEK2 have also been shown to increase the risk of other cancers, including prostate, however these cancer risks are currently not well understood.    BRIP1, RAD51C and RAD51D  Mutations in RAD51C and RAD51D have been shown to increase the risk of ovarian cancer and breast cancer 14,. Mutations in BRIP1 have been shown to increase the risk of ovarian cancer and possibly female breast cancer 15 .       Lifetime Cancer Risk    General Population        BRIP1   RAD51C  RAD51D   Breast 12% Not well defined 20-40% 20-40%   Ovarian 1-2% 5-15% 10-15% 10-20%     ______________________________________________________________  Inheritance  All of the cancer syndromes reviewed above are inherited in an autosomal dominant pattern.  This means that if a parent has a mutation,  each of their children will have a 50% chance of inheriting that same mutation. Therefore, each child --male or female-- would have a 50% chance of being at increased risk for developing cancer.    Image obtained from Genetics Home Reference, 2013     Mutations in some genes can occur de marcy, which means that a person s mutation occurred for the first time in them and was not inherited from a parent.  Now that they have the mutation, however, it can be passed on to future generations.    Genetic Testing  Genetic testing involves a blood test and will look for any harmful mutations that are associated with increased cancer risk.  If possible, it is recommended that the person(s) who has had cancer be tested before other family members.  That person will give us the most useful information about whether or not a specific gene is associated with the cancer in the family.    Results  There are three possible results of genetic testing:  Positive--a harmful mutation was identified in one or more of the genes  Negative--no mutations were identified in any of the genes tested  Variant of unknown significance--a variation in one of the genes was identified, but it is unclear how this impacts cancer risk in the family    Advantages and Disadvantages   There are advantages and disadvantages to genetic testing.    Advantages  May clarify your cancer risk  Can help you make medical decisions  May explain the cancers in your family  May give useful information to your family members (if you share your results)    Disadvantages  Possible negative emotional impact of learning about inherited cancer risk  Uncertainty in interpreting a negative test result in some situations  Possible genetic discrimination concerns (see below)    Genetic Information Nondiscrimination Act (JAZMÍN)  The Genetic Information Nondiscrimination Act of 2008 (JAZMÍN) is a federal law that protects individuals from health insurance or employment discrimination  based on a genetic test result alone (with some exceptions, including employers with fewer than 15 employees, and ).  Although rare, JAZMÍN  does not cover discrimination protections in terms of life insurance, long term care, or disability insurances.  Visit the National Human Cara Health Research Felt website to learn more.    Reducing Cancer Risk  All of the genes described in this handout have nationally recognized cancer screening guidelines that would be recommended for individuals who test positive.  In addition to increased cancer screening, surgeries may be offered or recommended to reduce cancer risk.  Recommendations are based upon an individual s genetic test result as well as their personal and family history of cancer.    Questions to Think About Regarding Genetic Testing:  What effect will the test result have on me and my relationship with my family members if I have an inherited gene mutation?  If I don t have a gene mutation?  Should I share my test results, and how will my family react to this news, which may also affect them?  Are my children ready to learn new information that may one day affect their own health?    Hereditary Cancer Resources    FORCE: Facing Our Risk of Cancer Empowered facingourrisk.org   Bright Pink bebrightpink.org   Li-Fraumeni Syndrome Association lfsassociation.org   PTEN World PTENworld.com   No stomach for cancer, Inc. nostomachforcancer.org   Stomach cancer relief network Scrnet.org   Collaborative Group of the Americas on Inherited Colorectal Cancer (CGA) cgaicc.com    Cancer Care cancercare.org   American Cancer Society (ACS) cancer.org   National Cancer Felt (NCI) cancer.gov     Please call us if you have any questions or concerns.   Cancer Risk Management Program [phone]-CANCER ([phone])  Bi Fernando, MS [facility]  [phone]  Christa Bahena, MS, [facility] [phone]  Rajni Hunter, MS, [facility]  [phone]  Consuelo Moran, MS, [facility]  [phone]  Chayo Brown,  MS, [facility]  [phone]  Sherry Choi, MS, [facility] [phone]  Lizz Cedeno, MS, [facility] [phone]    References  Ovidio Landeros PDP, Norma S, Elaina DODSON, Tanmay JE, Layla JL, Estrellita N, Claudio H, Gloria O, Demian A, Pasini B, Radipeace P, Manfiliberto S, Josué DM, Gómez N, Hillary E, Oracio H, Martin E, Jyothi J, Gronsunil J, Merry B, Tulinius H, Thorlacius S, Eerola H, Nevanlinna H, Shruti K, Jina OP. Average risks of breast and ovarian cancer associated with BRCA1 or BRCA2 mutations detected in case series unselected for family history: a combined analysis of 222 studies. Am J Hum Carrie. 2003;72:1117-30.  Jose Luis N, Shobha M, Gabbie G.  BRCA1 and BRCA2 Hereditary Breast and Ovarian Cancer. Gene Reviews online. 2013.  Akin YC, Nisa S, Loraine G, Whittington S. Breast cancer risk among male BRCA1 and BRCA2 mutation carriers. J Natl Cancer Inst. 2007;99:1811-4.  Bryce NEWSOME, Pineda I, Erasmo J, Soto E, Everton ER, Jordan F. Risk of breast cancer in male BRCA2 carriers. J Med Carrie. 2010;47:710-1.  National Comprehensive Cancer Network. Clinical practice guidelines in oncology, colorectal cancer screening. Available online (registration required). 2015.  Oren MH, Manny J, Sly J, Momo ALEJO, Steve MS, Eng C. Lifetime cancer risks in individuals with germline PTEN mutations. Clin Cancer Res. 2012;18:400-7.  Yoni R. Cowden Syndrome: A Critical Review of the Clinical Literature. J Carrie . 2009:18:13-27.  Chalo WHEATLEY, Ted CARDOZA, Trinity S, Nadine P, Milton T, Nisa M, Arvind B, Jonathan H, Deyvi R, Liu K, Marv L, Bryce NEWSOME, Josué CARDOZA, Wil DF, Chantel MR, The Breast Cancer Susceptibility Collaboration (UK) & Betty BEVERLY. JATINDER mutations that cause ataxia-telangiectasia are breast cancer susceptibility alleles. Nature Genetics. 2006;38:873-875  Cecil N , Samina Y, Susie J, Loree L, Sriram LANZA , Virgilio ML, Aaron S, Omid AG, Dianna S, Sun ML, Bernard J , Jj R, Franco MAYO, Mera  JR, Vaishnavi VE, Caitie M, Vodenisestein B, Lurdes N, La RH, Kathy KW, and Angelina AP. JATINDER mutations in patients with hereditary pancreatic cancer. Cancer Discover. 2012;2:41-46  Timoteo PEDERSEN., et al. Breast-Cancer Risk in Families with Mutations in PALB2. NEJM. 2014; 371(6):497-506.  CHEK2 Breast Cancer Case-Control Consortium. CHEK2*1100delC and susceptibility to breast cancer: A collaborative analysis involving 10,860 breast cancer cases and 9,065 controls from 10 studies. Am J Hum Carrie, 74 (2004), pp. 9416-2372  Mack T, Isac S, Stanley K, et al. Spectrum of Mutations in BRCA1, BRCA2, CHEK2, and TP53 in Families at High Risk of Breast Cancer. MEGHA. 2006;295(12):0872-9522.   Ignacia C, Florencio D, Vamsi WHEATLEY, et al. Risk of breast cancer in women with a CHEK2 mutation with and without a family history of breast cancer. J Clin Oncol. 2011;29:5812-7586.  Song H, Mos E, Ramus SJ, et al. Contribution of germline mutations in the RAD51B, RAD51C, and RAD51D genes to ovarian cancer in the population. J Clin Oncol. 2015;33(26):8117-1826. Doi:10.1200/JCO.2015.61.2408.  Nata T, Poli DF, Chris P, et al. Mutations in BRIP1 confer high risk of ovarian cancer. Iesha Carrie. 2011;43(11):0963-8754. doi:10.1038/ng.955.

## 2023-02-12 ENCOUNTER — LAB (OUTPATIENT)
Dept: LAB | Facility: CLINIC | Age: 48
End: 2023-02-12
Payer: COMMERCIAL

## 2023-02-12 DIAGNOSIS — Z80.3 FAMILY HISTORY OF BREAST CANCER: ICD-10-CM

## 2023-02-12 DIAGNOSIS — E04.1 THYROID NODULE: ICD-10-CM

## 2023-02-12 DIAGNOSIS — C73 THYROID CANCER (H): ICD-10-CM

## 2023-02-12 DIAGNOSIS — Z80.7 FAMILY HISTORY OF LYMPHOMA: ICD-10-CM

## 2023-02-12 DIAGNOSIS — Z80.8 FAMILY HISTORY OF SKIN CANCER: ICD-10-CM

## 2023-02-12 LAB — TSH SERPL DL<=0.005 MIU/L-ACNC: 2.4 UIU/ML (ref 0.3–4.2)

## 2023-02-12 PROCEDURE — 84443 ASSAY THYROID STIM HORMONE: CPT

## 2023-02-12 PROCEDURE — 99000 SPECIMEN HANDLING OFFICE-LAB: CPT

## 2023-02-12 PROCEDURE — 36415 COLL VENOUS BLD VENIPUNCTURE: CPT

## 2023-02-15 ENCOUNTER — TELEPHONE (OUTPATIENT)
Dept: FAMILY MEDICINE | Facility: CLINIC | Age: 48
End: 2023-02-15
Payer: COMMERCIAL

## 2023-02-15 NOTE — TELEPHONE ENCOUNTER
Reason for Call:  Appointment Request    Patient requesting this type of appt:  Hospital/ED Follow-Up     Requested provider: Marielle Mancilla    Reason patient unable to be scheduled: Not within requested timeframe    When does patient want to be seen/preferred time: 3-7 days    Comments:     Could we send this information to you in [facility] or would you prefer to receive a phone call?:   Patient would prefer a phone call   Okay to leave a detailed message?: Yes at Cell number on file:    Telephone Information:   Mobile [phone]       Call taken on 2/15/2023 at 1:19 PM by Destinee Tsai

## 2023-02-15 NOTE — TELEPHONE ENCOUNTER
Patient had surgery in January and want to follow up on that, she had labs done earlier this week. She has a Endocrine appointment scheduled for May. Would this be appropriate for a virtual visit? Or do you need to see her in clinic?    Mallory Garsia,

## 2023-02-17 ENCOUNTER — VIRTUAL VISIT (OUTPATIENT)
Dept: FAMILY MEDICINE | Facility: CLINIC | Age: 48
End: 2023-02-17
Payer: COMMERCIAL

## 2023-02-17 DIAGNOSIS — E89.0 S/P PARTIAL THYROIDECTOMY: ICD-10-CM

## 2023-02-17 PROBLEM — E04.1 THYROID NODULE: Status: RESOLVED | Noted: 2022-12-05 | Resolved: 2023-02-17

## 2023-02-17 PROBLEM — R89.6 ABNORMAL CYTOLOGY: Status: RESOLVED | Noted: 2022-12-05 | Resolved: 2023-02-17

## 2023-02-17 PROCEDURE — 99213 OFFICE O/P EST LOW 20 MIN: CPT | Mod: VID | Performed by: FAMILY MEDICINE

## 2023-02-17 NOTE — PROGRESS NOTES
"Elisabet is a 47 year old who is being evaluated via a billable video visit.      How would you like to obtain your AVS? MyChart  If the video visit is dropped, the invitation should be resent by: Text to cell phone: [phone]  Will anyone else be joining your video visit? No      Assessment & Plan     S/P partial thyroidectomy - recent TSH levels normal, incomplete set of labs - will run labs as below to determine if treatment is needed.   - T4, free; Future  - T3, total; Future    Weight gain - reports 20 lb weight gain over the past 2 months. Reports she is walking daily and tracking her calories - goal 1300 per day. Unclear etiology. Will await thyroid lab return.       Return in about 1 year (around 2/17/2024) for as needed.    Marielle Mancilla MD  [facility]    Angélica Bhandari is a 47 year old, presenting for the following health issues:  RECHECK    History of Present Illness       Reason for visit:  Follow up after surgery    She eats 2-3 servings of fruits and vegetables daily.She consumes 0 sweetened beverage(s) daily.She exercises with enough effort to increase her heart rate 30 to 60 minutes per day.  She exercises with enough effort to increase her heart rate 3 or less days per week.   She is taking medications regularly.       Left thyroidectomy last month.   Recent thyroid labs         Review of Systems   Constitutional, HEENT, cardiovascular, pulmonary, gi and gu systems are negative, except as otherwise noted.      Objective    Vitals - Patient Reported  Weight (Patient Reported): 81.6 kg (180 lb)  Height (Patient Reported): 162.6 cm (5' 4\")  BMI (Based on Pt Reported Ht/Wt): 30.9        Physical Exam   GENERAL: Healthy, alert and no distress  EYES: Eyes grossly normal to inspection.  No discharge or erythema, or obvious scleral/conjunctival abnormalities.  RESP: No audible wheeze, cough, or visible cyanosis.  No visible retractions or increased work of breathing.  "   SKIN: Visible skin clear. No significant rash, abnormal pigmentation or lesions.  NEURO: Cranial nerves grossly intact.  Mentation and speech appropriate for age.  PSYCH: Mentation appears normal, affect normal/bright, judgement and insight intact, normal speech and appearance well-groomed.        Video-Visit Details    Type of service:  Video Visit     Originating Location (pt. Location): Home    Distant Location (provider location):  Off-site  Platform used for Video Visit: Diego

## 2023-02-20 ENCOUNTER — VIRTUAL VISIT (OUTPATIENT)
Dept: ENDOCRINOLOGY | Facility: CLINIC | Age: 48
End: 2023-02-20
Payer: COMMERCIAL

## 2023-02-20 DIAGNOSIS — Z92.3 PERSONAL HISTORY OF IRRADIATION, PRESENTING HAZARDS TO HEALTH: ICD-10-CM

## 2023-02-20 DIAGNOSIS — E89.0 HISTORY OF PARTIAL THYROIDECTOMY: ICD-10-CM

## 2023-02-20 DIAGNOSIS — C73 FOLLICULAR CARCINOMA OF THYROID (H): Primary | ICD-10-CM

## 2023-02-20 PROCEDURE — 99215 OFFICE O/P EST HI 40 MIN: CPT | Mod: VID
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
## 2023-02-20 NOTE — PATIENT INSTRUCTIONS
Labs anywhere in the [facility] system     CT chest without contrast    Referral to medical weight management placed.      We will plan to treat with levothyroxine in order to achieve TSH of < 0.4.  Lets get the labs first.       Information for patients on Levo-thyroxine      The thyroid hormone, Levo-thyroxine (L-T4) is one of the main hormones normally produced by the thyroid.  The drug is identical in chemical structure to the natural product.  Levothyroxine is used to treat hypothyroidism (underactive thyroid).  Sometimes it is used even when the thyroid is not underactive, to treat a goiter (enlarged thyroid) or a thyroid nodule.  For patients with a history of thyroid removal, L-T4 is used to replace the deficiency created by the surgery.    The purpose of giving L-T4 to treat hypothyroidism is to make up for the thyroid hormone previously produced by your thyroid before it failed.  Depending on the extent of your thyroid failure, you may require a higher or a lower dose of L-T4.  The goal is to make your blood level of TSH (a hormone made by the master gland, the pituitary, the gland which controls and judges the thyroid) normal.    This drug is usually well-tolerated and safe but it is important to take the proper dose for YOU.  This involves periodic measurements of TSH, usually within 1-2 months of a dose change.  You should be off biotin containing supplements for at least one week prior to thyroid blood tests.    You should alert your doctor if you have signs you are getting too much L-T4.  These include excessive nervousness, heart fluttering or skipping beats, diarrhea, or feeling too hot and/or sweaty.      There are many brand names for Levo-thyroxine (L-T4), including Synthroid, Levoxyl, Levothroid, Unithroid, Tirosint and others.  Changing from one brand name thyroid hormone product to another or to a generic product (all generics are called levothyroxine) can cause changes in your thyroid hormone  balance, even if the dose stays the same.  Since generic products can come from many different companies (such as [employer], [employer], [employer] and others), there may be less consistency among the different generic preparations.  The decision to change brands or to change to a generic product must be made with your physician or other caregiver.  Follow-up testing should be arranged to monitor for any needed adjustments.  Monitoring may need to be more frequent if you always receive a generic thyroid hormone product.    For proper thyroid hormone balance the dose of thyroid hormone in your pills must be precise and consistent.    Be sure to take the medication as prescribed on an empty stomach, and at least 4 hours apart from calcium supplements, iron and soy products.  Store the medication away from severe heat and humidity.    You should be OFF any biotin containing supplements at least 7 days prior to thyroid lab draws.       Many insurance companies and other providers charge higher co-payments for brand name medications.  Since brand name L-T4 is not very expensive, be sure to ask if the actual cost of buying the medication is less than the co-payment.  In some instances the charge for a co-payment may be greater than buying the drug outright, especially if the prescription needs to be refilled every 30 days.
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
## 2023-02-20 NOTE — NURSING NOTE
Is the patient currently in the state of [place]? YES    Visit mode:VIDEO    If the visit is dropped, the patient can be reconnected by: VIDEO VISIT: Text to cell phone: [phone]    Will anyone else be joining the visit? NO    How would you like to obtain your AVS? MyChart    Are changes needed to the allergy or medication list? NO    Comments or concerns regarding today's visit: None.

## 2023-02-21 PROBLEM — C73: Status: ACTIVE | Noted: 2023-02-21

## 2023-02-21 LAB — SCANNED LAB RESULT: NORMAL

## 2023-02-21 NOTE — PROGRESS NOTES
"Video-Visit Details  Type of service:  Video Visit  Originating Location (pt. Location): Home  Distant Location (provider location):  Off-site  Mode of Communication:  Video Conference via [employer]  CHRIST MARTELL VVF    2/22/2023    Referring Provider:  Aminah Navarro MD    Presenting Information:  I spoke to Saloni today to discuss her genetic testing results. Her blood was drawn on 2/12/2023. The BRCA1 and BRCA2 genes with automatic reflex to the Common Hereditary Cancers panel was ordered from [facility]. This testing was done because of Saloni's personal history of thyroid cancer and family history of breast cancer.    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 This variant is called c.2150G>C (p.[id number]). No other variants or mutations were found in the JATINDER gene. Given the uncertain significance of this result, medical management decisions should NOT be made based on this test result alone.    Of note, Saloni tested negative for mutations in the following genes by sequencing and deletion/duplication analysis: APC, AXIN2, BARD1, BMPR1A, BRCA1, BRCA2, BRIP1, CDH1, CDK4, CDKN2A, CHEK2, CTNNA1, DICER1, EPCAM, GREM1, HOXB13, KIT, MEN1, MLH1, MSH2, MSH3, MSH6, MUTYH, NBN, NF1, NTHL1, PALB2, PDGFRA, PMS2, POLD1, POLE, PTEN,RAD50, RAD51C, RAD51D, SDHA, SDHB, SDHC, SDHD, SMAD4, SMARCA4, STK11, TP53,TSC1, TSC2, VHL. We reviewed the autosomal dominant inheritance of these genes. Saloni cannot pass on a mutation in any of these genes to her children based on this test result. Mutations in these genes do not skip generations.      A copy of the test report can be found in the Laboratory tab, dated 2/12/2023, and named \"LABORATORY MISCELLANEOUS ORDER\". The report is scanned in as a linked document.    Interpretation:  We discussed several different interpretations of this inconclusive test result. It is not " clear if this variant in the JATINDER gene is associated with increased cancer risk.  1. This variant may be a benign change that does not increase cancer risk.  2. This variant may be a harmful mutation that causes increased risk for cancer. Harmful mutations in the JATINDER gene are associated with increased risk for breast and pancreatic cancer and possibly ovarian and prostate cancer. In rare situations in which both parents have a mutation in the JATINDER gene, their children each have a 25% risk for ataxia-telangiectasia. Ataxia-telangiectasia is a rare autosomal recessive disorder that typically presents in childhood and can present with widened blood vessels called telangiectasias, progressive neurologic symptoms, immune deficiency, and increased risk for childhood cancers. We discussed that medical management decisions are NOT recommended for individuals with a VUS result.    The laboratory is working to determine if this variant is harmful or benign, and they will contact me if it is reclassified. If this variant is determined to be a benign change, there may be a different gene or combination of genes and environment that are associated with the cancers in this family.    It is also important to consider that her affected relatives may have had a mutation in one of the genes tested and she did not inherit it.     Inheritance:  We reviewed the autosomal dominant inheritance of this variant in the JATINDER gene. We discussed that Saloni has a 50% chance to pass this variant to each of her children. Likewise, her brother has a 50% risk of having the same variant. Because it is unclear what, if any, risk is associated with this variant, clinical genetic testing for this JATINDER variant alone is NOT recommended for relatives.    Screening:  Based on this inconclusive test result, it is important for Saloni and her relatives to refer back to the family history for appropriate cancer screening.    Saloni should continue to follow up  with her oncology team as recommended about her thyroid cancer surveillance.   Given Saloni's personal history of thyroid cancer, Saloni's family is likely at increased risk for thyroid cancer. Though there are no specific thyroid cancer screening recommendations based on family history alone, they are encouraged to share the family history with their medical providers, as their providers may have individualized screening recommendations.  Based on her personal and family history, Saloni has a 17.5% lifetime risk of developing breast cancer based on the AMANDA model. Therefore, Saloni does not meet current National Comprehensive Cancer Network (NCCN) guidelines for high risk breast screening, which is offered to women with a 20% lifetime risk or higher. However, it is still important for Saloni to continue with routine breast screening under the care of her physicians. Breast cancer screening is generally recommended to begin approximately 10 years younger than the earliest age of breast cancer diagnosis in the family, or at age 40, whichever comes first. In this family, screening may begin at age 40. Saloni is encouraged to discuss breast screening with her physicians.   Saloni should continue with her colon screening per the recommendations of her gastroenterologist, given her personal history of colon polyps.  Per NCCN guidelines, colonoscopy is typically recommended every 1-3 years, depending on polyp burden. Saloni's close relatives should be made aware of her colon polyp history and talk with their primary care providers about recommendations for their colonoscopy screening.    Other population cancer screening options, such as those recommended by the American Cancer Society and the National Comprehensive Cancer Network (NCCN), are also appropriate for Saloni and her family. These screening recommendations may change if there are changes to Saloni's personal and/or family history of cancer.     Final screening  recommendations should be made by each individual's primary care provider.    Additional Testing Considerations:  Although Saloni's genetic testing result is inconclusive, other relatives may still carry a harmful gene mutation associated with breast cancer. Genetic counseling is recommended for Saloni's mother, brother, and other maternal relatives to discuss genetic testing options. If any of these relatives do pursue genetic testing, Saloni is encouraged to contact me so that we may review the impact of their test results on her.    Summary:  We do not have an explanation for Saloni's personal and family history of cancer. While no genetic changes were identified, Saloni may still be at risk for certain cancers due to family history, environmental factors, or other genetic causes not identified by this test. Because of that, it is important that she continue with cancer screening based on her personal and family history as discussed above.    Genetic testing is rapidly advancing, and new cancer susceptibility genes will most likely be identified in the future. Therefore, I encouraged Saloni to contact me regularly or if there are changes in her personal or family history. This may change how we assess her cancer risk, screening, and the testing we would offer.    Plan:  1.  A copy of the test results will be mailed to Saloni.    2.  She plans to follow-up with her other providers.  3.  She should contact me regularly, or sooner if her family history changes.  4.  I will attempt to contact Saloni if the laboratory informs me that this VUS has been reclassified. This may change screening and testing recommendations for Saloni and her relatives.    If Saloni has any further questions, I encouraged her to contact me at [phone].    Time spent on video: 15 minutes    Supervising Genetic Counselor  Christa Bahena MS, [facility]  Licensed, Certified Genetic Counselor  Phone: [phone]    TIERNEY Vizcarra  Genetic  Counseling     Attestation: Patient seen, evaluated and discussed with the Genetic Counseling Intern. I have verified the content of the note, which accurately reflects my assessment of the patient and the plan of care.

## 2023-02-22 ENCOUNTER — VIRTUAL VISIT (OUTPATIENT)
Dept: ONCOLOGY | Facility: CLINIC | Age: 48
End: 2023-02-22
Payer: COMMERCIAL

## 2023-02-22 DIAGNOSIS — Z80.7 FAMILY HISTORY OF LYMPHOMA: ICD-10-CM

## 2023-02-22 DIAGNOSIS — Z80.8 FAMILY HISTORY OF SKIN CANCER: ICD-10-CM

## 2023-02-22 DIAGNOSIS — C73 THYROID CANCER (H): ICD-10-CM

## 2023-02-22 DIAGNOSIS — Z80.3 FAMILY HISTORY OF BREAST CANCER: Primary | ICD-10-CM

## 2023-02-22 PROCEDURE — 999N000069 HC STATISTIC GENETIC COUNSELING, < 16 MIN: Mod: GT,95

## 2023-02-22 NOTE — LETTER
Cancer Risk Management  Program Locations    [facility]  [facility]  [facility]  Mailing Address  Cancer Risk Management Program  [address]  [place] [address]    New patient appointments  [phone]  February 22, 2023    Saloni WHEATLEY Cierra  [address]  [facility] [address]    Dear Saloni,    It was a pleasure talking with you via your virtual genetic counseling visit on 2/22/2023.  Below is a copy of the progress note from that recent visit through the Cancer Risk Management Program.  If you have any additional questions, please feel free to contact me.    Referring Provider:  Aminah Navarro MD    Presenting Information:  I spoke to Saloni today to discuss her genetic testing results. Her blood was drawn on 2/12/2023. The BRCA1 and BRCA2 genes with automatic reflex to the Common Hereditary Cancers panel was ordered from [facility]. This testing was done because of Saloni's personal history of thyroid cancer and family history of breast cancer.    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 This variant is called c.2150G>C (p.Pdf164Cjz). No other variants or mutations were found in the JATINDER gene. Given the uncertain significance of this result, medical management decisions should NOT be made based on this test result alone.    Of note, Saloni tested negative for mutations in the following genes by sequencing and deletion/duplication analysis: APC, AXIN2, BARD1, BMPR1A, BRCA1, BRCA2, BRIP1, CDH1, CDK4, CDKN2A, CHEK2, CTNNA1, DICER1, EPCAM, GREM1, HOXB13, KIT, MEN1, MLH1, MSH2, MSH3, MSH6, MUTYH, NBN, NF1, NTHL1, PALB2, PDGFRA, PMS2, POLD1, POLE, PTEN,RAD50, RAD51C, RAD51D, SDHA, SDHB, SDHC, SDHD, SMAD4,  "SMARCA4, STK11, TP53,TSC1, TSC2, VHL. We reviewed the autosomal dominant inheritance of these genes. Saloni cannot pass on a mutation in any of these genes to her children based on this test result. Mutations in these genes do not skip generations.      A copy of the test report can be found in the Laboratory tab, dated 2/12/2023, and named \"LABORATORY MISCELLANEOUS ORDER\". The report is scanned in as a linked document.    Interpretation:  We discussed several different interpretations of this inconclusive test result. It is not clear if this variant in the JATINDER gene is associated with increased cancer risk.  1. This variant may be a benign change that does not increase cancer risk.  2. This variant may be a harmful mutation that causes increased risk for cancer. Harmful mutations in the JATINDER gene are associated with increased risk for breast and pancreatic cancer and possibly ovarian and prostate cancer. In rare situations in which both parents have a mutation in the JATINDER gene, their children each have a 25% risk for ataxia-telangiectasia. Ataxia-telangiectasia is a rare autosomal recessive disorder that typically presents in childhood and can present with widened blood vessels called telangiectasias, progressive neurologic symptoms, immune deficiency, and increased risk for childhood cancers. We discussed that medical management decisions are NOT recommended for individuals with a VUS result.    The laboratory is working to determine if this variant is harmful or benign, and they will contact me if it is reclassified. If this variant is determined to be a benign change, there may be a different gene or combination of genes and environment that are associated with the cancers in this family.    It is also important to consider that her affected relatives may have had a mutation in one of the genes tested and she did not inherit it.     Inheritance:  We reviewed the autosomal dominant inheritance of this variant in the " JATINDER gene. We discussed that Saloni has a 50% chance to pass this variant to each of her children. Likewise, her brother has a 50% risk of having the same variant. Because it is unclear what, if any, risk is associated with this variant, clinical genetic testing for this JATINDER variant alone is NOT recommended for relatives.    Screening:  Based on this inconclusive test result, it is important for Saloni and her relatives to refer back to the family history for appropriate cancer screening.    Saloni should continue to follow up with her oncology team as recommended about her thyroid cancer surveillance.   Given Saloni's personal history of thyroid cancer, Saloni's family is likely at increased risk for thyroid cancer. Though there are no specific thyroid cancer screening recommendations based on family history alone, they are encouraged to share the family history with their medical providers, as their providers may have individualized screening recommendations.  Based on her personal and family history, Saloni has a 17.5% lifetime risk of developing breast cancer based on the AMANDA model. Therefore, Saloni does not meet current National Comprehensive Cancer Network (NCCN) guidelines for high risk breast screening, which is offered to women with a 20% lifetime risk or higher. However, it is still important for Saloni to continue with routine breast screening under the care of her physicians. Breast cancer screening is generally recommended to begin approximately 10 years younger than the earliest age of breast cancer diagnosis in the family, or at age 40, whichever comes first. In this family, screening may begin at age 40. Saloni is encouraged to discuss breast screening with her physicians.   Saloni should continue with her colon screening per the recommendations of her gastroenterologist, given her personal history of colon polyps.  Per NCCN guidelines, colonoscopy is typically recommended every 1-3 years, depending  on polyp burden. Saloni's close relatives should be made aware of her colon polyp history and talk with their primary care providers about recommendations for their colonoscopy screening.    Other population cancer screening options, such as those recommended by the American Cancer Society and the National Comprehensive Cancer Network (NCCN), are also appropriate for Saloni and her family. These screening recommendations may change if there are changes to Saloni's personal and/or family history of cancer.     Final screening recommendations should be made by each individual's primary care provider.    Additional Testing Considerations:  Although Saloni's genetic testing result is inconclusive, other relatives may still carry a harmful gene mutation associated with breast cancer. Genetic counseling is recommended for Saloni's mother, brother, and other maternal relatives to discuss genetic testing options. If any of these relatives do pursue genetic testing, Saloni is encouraged to contact me so that we may review the impact of their test results on her.    Summary:  We do not have an explanation for Saloni's personal and family history of cancer. While no genetic changes were identified, Saloni may still be at risk for certain cancers due to family history, environmental factors, or other genetic causes not identified by this test. Because of that, it is important that she continue with cancer screening based on her personal and family history as discussed above.    Genetic testing is rapidly advancing, and new cancer susceptibility genes will most likely be identified in the future. Therefore, I encouraged Saloni to contact me regularly or if there are changes in her personal or family history. This may change how we assess her cancer risk, screening, and the testing we would offer.    Plan:  1.  A copy of the test results will be mailed to Saloni.    2.  She plans to follow-up with her other providers.  3.  She  should contact me regularly, or sooner if her family history changes.  4.  I will attempt to contact Saloni if the laboratory informs me that this VUS has been reclassified. This may change screening and testing recommendations for Saloni and her relatives.    If Saloni has any further questions, I encouraged her to contact me at [phone].    Supervising Genetic Counselor  Christa Bahena MS, [facility]  Licensed, Certified Genetic Counselor  Phone: [phone]    TIERNEY Vizcarra  Genetic Counseling     Attestation: Patient seen, evaluated and discussed with the Genetic Counseling Intern. I have verified the content of the note, which accurately reflects my assessment of the patient and the plan of care.

## 2023-02-22 NOTE — PATIENT INSTRUCTIONS
Genetic Testing  Genetic testing involved a blood or saliva test which looked at the genetic information in select genes for variants associated with cancer risk.  This testing may have included analysis of a single gene due to a known variant in the family, multiple genes most associated with the cancers in a family, or an expanded panel of genes related to many types of cancers.    Results  There are several possible genetic test results, including:   Positive--a harmful mutation (also known as a  pathogenic  or  likely pathogenic  variant) was identified in a gene associated with increased cancer risk.  These risks, as well as medical management options, depend on the specific genetic variant identified.    Negative--no variants were identified in the genes analyzed   Variant of unknown significance--a variant was identified in one or more genes, though it is currently unclear how this impacts cancer risk in the family.  Genetic testing labs are working to collect evidence about these uncertain variants and may provide updates in the future.    What is a Variant of Unknown Significance?  A variant of unknown significance (VUS) is a genetic change with unclear clinical significance.  Scientists currently do not know if this specific variant is associated with increased cancer risks,  or if it is a benign (harmless) change with no impact on health.       A variant may be of uncertain significance for several reasons.  It is possible that this specific variant has not been seen before by the laboratory, or only in a small number of families.  There is currently not enough information to know how this variant may impact your health.          Reclassification  Genetic testing laboratories and researchers are collecting evidence to determine the importance of variants of unknown significance.  Many variants of uncertain significance are later reclassified as benign findings, however some may be associated with  increased cancer risk.  Laboratories will often notify the genetic counselor/ordering provider when a patient s VUS has been reclassified.        Some families may be candidates for participation in the laboratory s variant research programs to help determine the importance of their VUS.  Participating in these programs does not guarantee that families will learn the significance of their VUS immediately.  It could be months or years before a VUS is reclassified.       Screening Recommendations  A combination of personal and family history factors may inform cancer risk and medical management recommendations.  Population cancer screening options, such as those recommended by the American Cancer Society and the National Comprehensive Cancer Network (NCCN) are appropriate for many families at average risk for cancer.  However, earlier and/or more frequent screening may be recommended based on personal factors (lifestyle, exposures, medications, screening results), family history of cancer, and sometimes genetic factors.  These cancer risk management options should be discussed in more detail with an individual's medical providers.      It is usually not recommended that other relatives have genetic testing for the VUS unless it is done as part of the laboratory s variant research program because an individual s test results should not influence their cancer screening until we determine the importance of the VUS.  Your genetic counselor can help you and your relatives understand the risks and benefits of all genetic testing and cancer screening options.    Please call us if you have any questions or concerns.   Cancer Risk Management Program (Appointments: [phone])  Bi Fernando, MS [facility]  [phone]  Christa Bahena, MS, [facility] [phone]  Rajni Hunter, MS, [facility]  [phone]  Consuelo Moran, MS, [facility]  [phone]  Chayo Brown, MS, [facility]  [phone]  Sherry Choi, MS, [facility] [phone]  Lizz Cedeno MS,  [facility] [phone]

## 2023-02-22 NOTE — Clinical Note
"Hello,    Please enclose a copy of the test report from the laboratory tab titled \"LAB MISC ORDER\" dated 2/12/2023 to send to the patient along with the letter.    Referring provider: please see summary of genetic test results below.    Thank you,    Christa Bahena MS, [facility]  Licensed, Certified Genetic Counselor"

## 2023-02-22 NOTE — NURSING NOTE
Is the patient currently in the state of [place]? YES    Visit mode:VIDEO    If the visit is dropped, the patient can be reconnected by: VIDEO VISIT: Send to e-mail at: [email or web address]    Will anyone else be joining the visit? NO      How would you like to obtain your AVS? MyChart    Are changes needed to the allergy or medication list? NO    Reason for visit: Reviewing test results.    CHRIST ROBBINS

## 2023-02-24 ENCOUNTER — PRE VISIT (OUTPATIENT)
Dept: OTOLARYNGOLOGY | Facility: CLINIC | Age: 48
End: 2023-02-24

## 2023-02-24 ENCOUNTER — OFFICE VISIT (OUTPATIENT)
Dept: OTOLARYNGOLOGY | Facility: CLINIC | Age: 48
End: 2023-02-24
Attending: SURGERY
Payer: COMMERCIAL

## 2023-02-24 ENCOUNTER — THERAPY VISIT (OUTPATIENT)
Dept: SPEECH THERAPY | Facility: CLINIC | Age: 48
End: 2023-02-24
Payer: COMMERCIAL

## 2023-02-24 VITALS
SYSTOLIC BLOOD PRESSURE: 138 MMHG | OXYGEN SATURATION: 100 % | HEIGHT: 64 IN | WEIGHT: 190.6 LBS | DIASTOLIC BLOOD PRESSURE: 84 MMHG | BODY MASS INDEX: 32.54 KG/M2 | HEART RATE: 94 BPM

## 2023-02-24 DIAGNOSIS — R13.12 OROPHARYNGEAL DYSPHAGIA: ICD-10-CM

## 2023-02-24 DIAGNOSIS — R49.0 DYSPHONIA: Primary | ICD-10-CM

## 2023-02-24 DIAGNOSIS — J38.01 VOCAL FOLD PARESIS, UNILATERAL: ICD-10-CM

## 2023-02-24 DIAGNOSIS — R13.12 OROPHARYNGEAL DYSPHAGIA: Primary | ICD-10-CM

## 2023-02-24 DIAGNOSIS — J38.01 VOCAL FOLD PARESIS, LEFT: ICD-10-CM

## 2023-02-24 DIAGNOSIS — E04.1 THYROID NODULE: ICD-10-CM

## 2023-02-24 DIAGNOSIS — J38.00 VOCAL CORD PARALYSIS: Primary | ICD-10-CM

## 2023-02-24 PROCEDURE — 92524 BEHAVRAL QUALIT ANALYS VOICE: CPT | Mod: GN | Performed by: SPEECH-LANGUAGE PATHOLOGIST

## 2023-02-24 PROCEDURE — 92610 EVALUATE SWALLOWING FUNCTION: CPT | Mod: GN | Performed by: SPEECH-LANGUAGE PATHOLOGIST

## 2023-02-24 PROCEDURE — 92612 ENDOSCOPY SWALLOW (FEES) VID: CPT | Mod: GN | Performed by: OTOLARYNGOLOGY

## 2023-02-24 PROCEDURE — 92613 ENDOSCOPY SWALLOW (FEES) I&R: CPT | Performed by: OTOLARYNGOLOGY

## 2023-02-24 PROCEDURE — 99204 OFFICE O/P NEW MOD 45 MIN: CPT | Mod: 25 | Performed by: OTOLARYNGOLOGY

## 2023-02-24 ASSESSMENT — PAIN SCALES - GENERAL: PAINLEVEL: NO PAIN (0)

## 2023-02-24 NOTE — LETTER
2023       RE: Saloni Norton  [address]  [facility] [address]     Dear Colleague,    Thank you for referring your patient, Saloni Norton, to the [facility] EAR NOSE AND THROAT CLINIC [place] at [facility]. Please see a copy of my visit note below.        [facility]   at the [facility]   Otolaryngology Clinic     Patient: Saloni Norton    MRN: [id number]    : 1975    Age/Gender: 47 year old female  Date of Service: 2023  Rendering Provider:   Adali Schafer MD     Referring Provider   PCP: Marielle Mancilla  Referring Physician: Charley Humphreys MD  [facility]  [address] [facility]  [address]  [place] [address]  Reason for Consultation   Left vocal fold paralysis  Dysphonia  History   HISTORY OF PRESENT ILLNESS: Ms. Norton is a 47 year old female who presents to us today with  dysphonia.      Of note, the patient underwent left thyroid lobectomy and isthmusectomy on 23 after she was noted to have thyroid nodules with a 50% risk of malignancy.    She presents today for evaluation. She reports:    Dysphonia  - for a month after surgery, unable to talk  - her voice returned on 23   - since then, has been improving significantly  - almost fully back to normal  - now able to holler to  upstairs  - no voice issues prior to surgery    Dysphagia  - initially trouble swallowing with liquids, like water  - now feels her swallow is fine    Dyspnea  - denies    Throat clearing/cough  - denies    GERD/LPRD   - denies    PAST MEDICAL HISTORY:   Past Medical History:   Diagnosis Date     Colon polyps      Diffuse cystic mastopathy     Fibrocystic breasts, asymmetric breast tissue     Follicular carcinoma of thyroid (H) 2023     History of radiation exposure     [place] age 12 [facility] HI-RISK PREG-YOUNG MULTIP 2006     Thyroid nodule      Tobacco use disorder      quit        PAST SURGICAL HISTORY:   Past Surgical History:   Procedure Laterality Date     BIOPSY  2022     THYROIDECTOMY Left 2023    Procedure: Left thyroid lobectomy and isthmusectomy;  Surgeon: Charley Humphreys MD;  Location: [facility] OR     TONSILLECTOMY  1980    unilateral (right) tonsillectomy       CURRENT MEDICATIONS: No current outpatient medications on file.    ALLERGIES: Patient has no known allergies.    SOCIAL HISTORY:    Social History     Socioeconomic History     Marital status:      Spouse name: Bishnu     Number of children: 1     Years of education: 16     Highest education level: Not on file   Occupational History     Occupation: Dental      Comment: [employer]   Tobacco Use     Smoking status: Former     Packs/day: 0.50     Years: 10.00     Pack years: 5.00     Types: Cigarettes     Quit date: 2004     Years since quittin.7     Smokeless tobacco: Never     Tobacco comments:     total smoking about 5 pack years   Vaping Use     Vaping Use: Never used   Substance and Sexual Activity     Alcohol use: Yes     Comment: less than monthly     Drug use: No     Sexual activity: Yes     Partners: Male     Birth control/protection: Condom   Other Topics Concern      Service No     Blood Transfusions No     Caffeine Concern Not Asked     Occupational Exposure Not Asked     Hobby Hazards Not Asked     Sleep Concern Not Asked     Stress Concern Not Asked     Weight Concern Not Asked     Special Diet Not Asked     Back Care Not Asked     Exercise Yes     Comment: about twice weekly     Bike Helmet Not Asked     Seat Belt Yes     Self-Exams Yes   Social History Narrative     Not on file     Social Determinants of Health     Financial Resource Strain: Low Risk      Difficulty of Paying Living Expenses: Not hard at all   Food Insecurity: No Food Insecurity     Worried About Running Out of Food in the Last Year: Never true     Ran Out of Food in the Last  Year: Never true   Transportation Needs: No Transportation Needs     Lack of Transportation (Medical): No     Lack of Transportation (Non-Medical): No   Physical Activity: Insufficiently Active     Days of Exercise per Week: 1 day     Minutes of Exercise per Session: 30 min   Stress: No Stress Concern Present     Feeling of Stress : Not at all   Social Connections: Moderately Integrated     Frequency of Communication with Friends and Family: More than three times a week     Frequency of Social Gatherings with Friends and Family: Once a week     Attends Jew Services: Never     Active Member of Clubs or Organizations: Yes     Attends Club or Organization Meetings: Not on file     Marital Status:    Intimate Partner Violence: Not on file   Housing Stability: Unknown     Unable to Pay for Housing in the Last Year: No     Number of Places Lived in the Last Year: Not on file     Unstable Housing in the Last Year: No         FAMILY HISTORY:   Family History   Problem Relation Age of Onset     Heart Disease Mother         pacemaker     Thyroid nodules Mother      Thyroid Disease Mother      Heart Disease Father      Diabetes Father      C.A.D. Father         MI age 62     Alzheimer Disease Father      Hypertension Father      Hyperlipidemia Father      Lipids Brother      Breast Cancer Maternal Grandmother         and Ailyn had post-menopausal cancer     Neurologic Disorder Paternal Grandmother         Alzheimer's     Lymphoma Cousin      Cancer - colorectal No family hx of      Thyroid Cancer No family hx of      Non-contributory for problems with anesthesia    REVIEW OF SYSTEMS:   The patient was asked a 14 point review of systems regarding constitutional symptoms, eye symptoms, ears, nose, mouth, throat symptoms, cardiovascular symptoms, respiratory symptoms, gastrointestinal symptoms, genitourinary symptoms, musculoskeletal symptoms, integumentary symptoms, neurological symptoms, psychiatric symptoms,  endocrine symptoms, hematologic/lymphatic symptoms, and allergic/ immunologic symptoms.   The pertinent factors have been included in the HPI and below.  Patient Supplied Answers to Review of Systems   ENT ROS 2/17/2023   Constitutional: Weight gain   Ears, Nose, Throat: Hoarseness       Physical Examination   The patient underwent a physical examination as described below. The pertinent positive and negative findings are summarized after the description of the examination.  Constitutional: The patient's developmental and nutritional status was assessed. The patient's voice quality was assessed.  Head and Face: The head and face were inspected for deformities. The sinuses were palpated. The salivary glands were palpated. Facial muscle strength was assessed bilaterally.  Eyes: Extraocular movements and primary gaze alignment were assessed.  Ears, Nose, Mouth and Throat: The ears and nose were examined for deformities. The nasal septum, mucosa, and turbinates were inspected by anterior rhinoscopy. The lips, teeth, and gums were examined for abnormalities. The oral mucosa, tongue, palate, tonsils, lateral and posterior pharynx were inspected for the presence of asymmetry or mucosal lesions.    Neck: The tracheal position was noted, and the neck mass palpated to determine if there were any asymmetries, abnormal neck masses, thyromegally, or thyroid nodules.  Respiratory: The nature of the breathing and chest expansion/symmetry was observed.  Cardiovascular: The patient was examined to determine the presence of any edema or jugular venous distension.  Abdomen: The contour of the abdomen was noted.  Lymphatic: The patient was examined for infraclavicular lymphadenopathy.  Musculoskeletal: The patient was inspected for the presence of skeletal deformities.  Extremities: The extremities were examined for any clubbing or cyanosis.  Skin: The skin was examined for inflammatory or neoplastic conditions.  Neurologic: The  patient's orientation, mood, and affect were noted. The cranial nerve  functions were examined.  Other pertinent positive and negative findings on physical examination:      OC/OP: no lesions, uvula midline, soft palate elevates symmetrically   Neck: no lesions, no TH tenderness to palpation. Midline neck incision well healed.  All other physical examination findings were within normal limits and noncontributory.  Procedures   Flexible laryngoscopy (CPT 09617)      Pre-procedure diagnosis: dysphonia  Post-procedure diagnosis: same as above  Indication for procedure: Ms. Norton is a 47 year old female with see above  Procedure(s): Fiberoptic Laryngoscopy    Details of Procedure: After informed consent was obtained, the patient was seated in the examination chair.  The areas of the nasopharynx as well as the hypopharynx were anesthetized with topical 4% lidocaine with 0.25% phenylephrine atomizer.  Examination of the base of tongue was performed first.  Attention was directed to any evidence of masses in the area or evidence of leukoplakia or candidal infection.  Attention was directed to the epiglottis where its size and position was determined and its movement on phonation of the vowel  e .  The piriform sinuses were then inspected for any mass lesions or pooling of secretions.  Attention was then directed to the larynx. The vocal folds were inspected for infection or any areas of leukoplakia, for masses, polypoid degeneration, or hemorrhage.  Having done this, the arytenoids and vocal processes were inspected for erythema or evidence of granuloma formation.  The posterior commissure was then inspected for evidence of inflammatory changes in the mucosa and heaping up of mucosal tissue. The patient was then instructed to say the vowel  e .  Adduction of vocal folds to the midline was observed for any evidence of paresis or paralysis of the larynx or asymmetry in rotation of the larynx to the left or right. The  patient was asked to breathe and the degree of abduction was noted bilaterally.  Subglottic view of the larynx was obtained for any additional mass lesions or mucosal changes.  Finally the post cricoid was examined for evidence of pooling of secretions, as well as the pharyngeal wall mucosa.   Anesthesia type: 0.25% phenylephrine    Findings:  Anatomic/physiological deviations: RNC, left vocal fold paresis, bowing, still some glottic gap   Right vocal process: No restriction of mobility   Left vocal process: Some restriction of mobility  Glottal gap: Glottal gap  Supraglottic structures: Normal  Hypopharynx: Normal     Estimated Blood Loss: minimal  Complications: None  Disposition: Patient tolerated the procedure well            Fiberoptic Endoscopic Evaluation of Swallowing (CPT 95624)  and Interpretation of Swallowing (CPT 38732)    Indications: See above notes for complete history and physical. Patient complains of dysphagia to both solids and liquids and/or there is suggestion on history and endoscopic exam of the presence of dysphagia causing medical complaints.  Swallowing evaluation is being performed to assess the presence and degree of dysphagia, and to recommend a safe diet.     Pulmonary Status:  No PNA   Current Diet:              regular                                        thin liquids      Consistency Amounts:  Thin Liquid: sip   Puree: 1 tsp  Solid: cookies            Positioning: upright in a chair  Oral Peripheral Exam: See physical exam section.  Anatomic Notes: See Videostroboscopy report for assessment of anatomy and laryngeal functioning  Pharyngeal secretions prior to administration of liquid or food: No  Oral Phase Abnormal Findings: No abnormal behavior observed  Behavioral Adaptations: No abnormal behavior observed  Pharyngeal Phase Abnormal Findings: penetration with thins, did cough    Recommended Diet:  regular                                        thin liquids           Review of  Relevant Clinical Data   I personally reviewed:  Notes:   1/19/23 note with Charley Humphreys MD (general surgery)  HPI:  Saloni is here for follow up after left thyroid lobectomy and isthmusectomy 2 weeks ago. She reports good energy and no pain. 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  The nerve remains physically intact, but suffered either a stretch or thermal injury and stopped conducting. As such, she has a hoarse voice. She says that her voice strength fluctuates throughout the day. She feels the need to clear her throat often now.     Assessment and Plan:  Saloni is recovering after left thyroid lobectomy and isthmusectomy. 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 Typically, I would recommend proceeding with completion thyroidectomy with this pathology so that she would have the option of radioactive iodine and for ease of future surveillance. However, with her left vocal cord paralysis, I recommend not proceeding with any right-sided thyroid surgery unless nerve function returns.     We discussed the nature of her RLN injury and how stretch/thermal injuries can resolve with time, but that it might take months. There is also a chance that this vocal cord paralysis will be permanent. I would like her to consult with a laryngologist who can assess her vocal cord function over time and provide therapeutic options to help her regain some voice strength.  We have briefly discussed the procedure of vocal cord injection.  I have also placed a referral to speech therapy.       If her left RLN regains function, we should consider completion thyroidectomy for her follicular thyroid cancer. If her RLN laryngeal nerve does not regain function then our options would be simply observation, completion thyroidectomy despite the high surgical  risk (not recommended), or I131 ablation of the remaining lobe. While not standard treatment for differentiated thyroid cancer, lobar ablation with I131 has been described in small studies and is feasible in patients with small thyroid lobes without nodules, such as Ms. Norton. At this point, we need to give the nerve time to declare itself.      She needs her TSH checked in 4-6 weeks to assess for post-operative hypothyroidism. She is currently clinically euthyroid and feeling well. She has asked for a referral for another endocrinologist closer to home and this referral has been made.  I will follow up with her by phone when her TSH has returned to discuss next steps. I will touch base with her endocrinologist at that time to discuss the options mentioned above. We will plan for follow up after she has met with the laryngologist as well.    1/4/23  Procedure note - left thyroid lobectomy, isthmusectomy    Radiology:   US Head Neck (12/14/22)  IMPRESSION: Bilateral neck lymph nodes with examples as above. A grouping of small subcentimeter lymph nodes at the left neck zone 1 are rounded in morphology without conspicuous fatty shravan and are therefore indeterminant.    Labs:  Lab Results   Component Value Date    TSH 2.40 02/12/2023     Lab Results   Component Value Date     12/27/2022    CO2 25 12/27/2022    BUN 17.9 12/27/2022     Lab Results   Component Value Date    WBC 7.7 12/27/2022    HGB 14.2 12/27/2022    HCT 41.8 12/27/2022    MCV 84 12/27/2022     12/27/2022     No results found for: PT, PTT, INR  No results found for: BEAU  No components found for: RHEUMATOIDFACTOR,  RF  No results found for: CRP  No components found for: CKTOT, URICACID  No components found for: C3, C4, DSDNAAB, NDNAABIFA  No results found for: MPOAB    Patient reported Quality of Life (QOL) Measures   Patient Supplied Answers To VHI Questionnaire  Voice Handicap Index (VHI-10) 2/17/2023   My voice makes it difficult for  "people to hear me 3   People have difficulty understanding me in a noisy room 3   My voice difficulties restrict my personal and social life.  2   I feel left out of conversations because of my voice 1   My voice problem causes me to lose income 1   I feel as though I have to strain to produce voice 1   The clarity of my voice is unpredictable 2   My voice problem upsets me 1   My voice makes me feel handicapped 1   People ask, \"What's wrong with your voice?\" 3   VHI-10 18         Patient Supplied Answers To EAT Questionnaire  Eating Assessment Tool (EAT-10) 2023   My swallowing problem has caused me to lose weight 0   My swallowing problem interferes with my ability to go out for meals 0   Swallowing liquids takes extra effort 0   Swallowing solids takes extra effort 0   Swallowing pills takes extra effort 0   Swallowing is painful 0   The pleasure of eating is affected by my swallowing 0   When I swallow food sticks in my throat 0   I cough when I eat 0   Swallowing is stressful 0   EAT-10 0         Patient Supplied Answers To CSI Questionnaire  Cough Severity Index (CSI) 2023   My cough is worse when I lie down 0   My coughing problem causes me to restrict my personal and social life 0   I tend to avoid places because of my cough problem 0   I feel embarrassed because of my coughing problem 0   People ask, ''What's wrong?'' because I cough a lot 0   I run out of air when I cough 0   My coughing problem affects my voice 0   My coughing problem limits my physical activity 0   My coughing problem upsets me 0   People ask me if I am sick because I cough a lot 0   CSI Score 0         Patient Supplied Answers to Dyspnea Index Questionnaire:  No flowsheet data found.    Impression & Plan     IMPRESSION: Ms. Norton is a 47 year old female who is being seen for the followin. Dysphonia  - in the setting of left thyroid lobectomy and isthmusectomy 23  - for a month after surgery, unable to talk  - her " voice returned on 2/20/23   - since then, has been improving consistently  - almost fully back to normal  - scope shows left vocal fold paresis, bowing, still some glottic gap  - - discussed etiology of vocal fold paralysis in this case is due to surgery  - discussed prognosis of vocal fold paralysis is good since she already has some movement in her vocal fold    - discussed options of treatment which include observation vs voice therapy  - given she does not have pain at this time - will proceed with observation  - given she still has a glottic gap would continue voice use limitation at work   - will provide a letter of accommodation to limit voice use at work, especially phone conversation  Plan  - will send letter via [facility] - continue current voice rest for another month, then gradual increase by starting with 1 call per hour for a week, then slowly increasing to 1 call per half an hour as tolerated  - follow up in 8 weeks    2. Dysphagia  - initially after surgery, had difficulty with water  - now swallow is better  - FEES shows penetration with thins, did cough  - symptoms likely due to glottic insufficiency, discussed swallow precautions   Plan  - swallow precautions    RETURN VISIT: 4/20/23 at 8 AM     Thank you for the kind referral and for allowing me to share in the care of Ms. Norton. If you have any questions, please do not hesitate to contact me.          Again, thank you for allowing me to participate in the care of your patient.      Sincerely,    Adali Schafer MD

## 2023-02-24 NOTE — PATIENT INSTRUCTIONS
1.  You were seen in the ENT Clinic today by . If you have any questions or concerns after your appointment, please call [phone]. Press option #1 for scheduling related needs. Press option #3 for Nurse advice.    2.   has recommended  the following:   - swallow precautions   - letter for work via my chart    3.  Plan is to return to clinic 4/20/23 at 8 am      Winsome Haddad LPN  [phone]  [facility] - Otolaryngology

## 2023-02-24 NOTE — PROGRESS NOTES
[facility]   at the [facility]   Otolaryngology Clinic     Patient: Saloni Norton    MRN: [id number]    : 1975    Age/Gender: 47 year old female  Date of Service: 2023  Rendering Provider:   Adali Schafer MD     Referring Provider   PCP: Marielle Mancilla  Referring Physician: Charley Humphreys MD  [facility]  [address]  [address]  [place] [id number]  Reason for Consultation   Left vocal fold paralysis  Dysphonia  History   HISTORY OF PRESENT ILLNESS: Ms. Norton is a 47 year old female who presents to us today with  dysphonia.      Of note, the patient underwent left thyroid lobectomy and isthmusectomy on 23 after she was noted to have thyroid nodules with a 50% risk of malignancy.    She presents today for evaluation. She reports:    Dysphonia  - for a month after surgery, unable to talk  - her voice returned on 23   - since then, has been improving significantly  - almost fully back to normal  - now able to holler to  upstairs  - no voice issues prior to surgery    Dysphagia  - initially trouble swallowing with liquids, like water  - now feels her swallow is fine    Dyspnea  - denies    Throat clearing/cough  - denies    GERD/LPRD   - denies    PAST MEDICAL HISTORY:   Past Medical History:   Diagnosis Date     Colon polyps      Diffuse cystic mastopathy     Fibrocystic breasts, asymmetric breast tissue     Follicular carcinoma of thyroid (H) 2023     History of radiation exposure     [place] age 12 [facility] HI-RISK PREG-YOUNG MULTIP 2006     Thyroid nodule      Tobacco use disorder     quit        PAST SURGICAL HISTORY:   Past Surgical History:   Procedure Laterality Date     BIOPSY  2022     THYROIDECTOMY Left 2023    Procedure: Left thyroid lobectomy and isthmusectomy;  Surgeon: Charley Humphresy MD;  Location: [facility] OR     TONSILLECTOMY  1980    unilateral (right) tonsillectomy        CURRENT MEDICATIONS: No current outpatient medications on file.    ALLERGIES: Patient has no known allergies.    SOCIAL HISTORY:    Social History     Socioeconomic History     Marital status:      Spouse name: Bishnu     Number of children: 1     Years of education: 16     Highest education level: Not on file   Occupational History     Occupation: Dental      Comment: [employer]   Tobacco Use     Smoking status: Former     Packs/day: 0.50     Years: 10.00     Pack years: 5.00     Types: Cigarettes     Quit date: 2004     Years since quittin.7     Smokeless tobacco: Never     Tobacco comments:     total smoking about 5 pack years   Vaping Use     Vaping Use: Never used   Substance and Sexual Activity     Alcohol use: Yes     Comment: less than monthly     Drug use: No     Sexual activity: Yes     Partners: Male     Birth control/protection: Condom   Other Topics Concern      Service No     Blood Transfusions No     Caffeine Concern Not Asked     Occupational Exposure Not Asked     Hobby Hazards Not Asked     Sleep Concern Not Asked     Stress Concern Not Asked     Weight Concern Not Asked     Special Diet Not Asked     Back Care Not Asked     Exercise Yes     Comment: about twice weekly     Bike Helmet Not Asked     Seat Belt Yes     Self-Exams Yes   Social History Narrative     Not on file     Social Determinants of Health     Financial Resource Strain: Low Risk      Difficulty of Paying Living Expenses: Not hard at all   Food Insecurity: No Food Insecurity     Worried About Running Out of Food in the Last Year: Never true     Ran Out of Food in the Last Year: Never true   Transportation Needs: No Transportation Needs     Lack of Transportation (Medical): No     Lack of Transportation (Non-Medical): No   Physical Activity: Insufficiently Active     Days of Exercise per Week: 1 day     Minutes of Exercise per Session: 30 min   Stress: No Stress Concern Present     Feeling  of Stress : Not at all   Social Connections: Moderately Integrated     Frequency of Communication with Friends and Family: More than three times a week     Frequency of Social Gatherings with Friends and Family: Once a week     Attends Protestant Services: Never     Active Member of Clubs or Organizations: Yes     Attends Club or Organization Meetings: Not on file     Marital Status:    Intimate Partner Violence: Not on file   Housing Stability: Unknown     Unable to Pay for Housing in the Last Year: No     Number of Places Lived in the Last Year: Not on file     Unstable Housing in the Last Year: No         FAMILY HISTORY:   Family History   Problem Relation Age of Onset     Heart Disease Mother         pacemaker     Thyroid nodules Mother      Thyroid Disease Mother      Heart Disease Father      Diabetes Father      C.A.D. Father         MI age 62     Alzheimer Disease Father      Hypertension Father      Hyperlipidemia Father      Lipids Brother      Breast Cancer Maternal Grandmother         and MAunt had post-menopausal cancer     Neurologic Disorder Paternal Grandmother         Alzheimer's     Lymphoma Cousin      Cancer - colorectal No family hx of      Thyroid Cancer No family hx of      Non-contributory for problems with anesthesia    REVIEW OF SYSTEMS:   The patient was asked a 14 point review of systems regarding constitutional symptoms, eye symptoms, ears, nose, mouth, throat symptoms, cardiovascular symptoms, respiratory symptoms, gastrointestinal symptoms, genitourinary symptoms, musculoskeletal symptoms, integumentary symptoms, neurological symptoms, psychiatric symptoms, endocrine symptoms, hematologic/lymphatic symptoms, and allergic/ immunologic symptoms.   The pertinent factors have been included in the HPI and below.  Patient Supplied Answers to Review of Systems  [facility] E[facility] ROS 2/17/2023   Constitutional: Weight gain   Ears, Nose, Throat: Hoarseness       Physical Examination   The patient  underwent a physical examination as described below. The pertinent positive and negative findings are summarized after the description of the examination.  Constitutional: The patient's developmental and nutritional status was assessed. The patient's voice quality was assessed.  Head and Face: The head and face were inspected for deformities. The sinuses were palpated. The salivary glands were palpated. Facial muscle strength was assessed bilaterally.  Eyes: Extraocular movements and primary gaze alignment were assessed.  Ears, Nose, Mouth and Throat: The ears and nose were examined for deformities. The nasal septum, mucosa, and turbinates were inspected by anterior rhinoscopy. The lips, teeth, and gums were examined for abnormalities. The oral mucosa, tongue, palate, tonsils, lateral and posterior pharynx were inspected for the presence of asymmetry or mucosal lesions.    Neck: The tracheal position was noted, and the neck mass palpated to determine if there were any asymmetries, abnormal neck masses, thyromegally, or thyroid nodules.  Respiratory: The nature of the breathing and chest expansion/symmetry was observed.  Cardiovascular: The patient was examined to determine the presence of any edema or jugular venous distension.  Abdomen: The contour of the abdomen was noted.  Lymphatic: The patient was examined for infraclavicular lymphadenopathy.  Musculoskeletal: The patient was inspected for the presence of skeletal deformities.  Extremities: The extremities were examined for any clubbing or cyanosis.  Skin: The skin was examined for inflammatory or neoplastic conditions.  Neurologic: The patient's orientation, mood, and affect were noted. The cranial nerve  functions were examined.  Other pertinent positive and negative findings on physical examination:      OC/OP: no lesions, uvula midline, soft palate elevates symmetrically   Neck: no lesions, no TH tenderness to palpation. Midline neck incision well  healed.  All other physical examination findings were within normal limits and noncontributory.  Procedures   Flexible laryngoscopy (CPT 99122)      Pre-procedure diagnosis: dysphonia  Post-procedure diagnosis: same as above  Indication for procedure: Ms. Norton is a 47 year old female with see above  Procedure(s): Fiberoptic Laryngoscopy    Details of Procedure: After informed consent was obtained, the patient was seated in the examination chair.  The areas of the nasopharynx as well as the hypopharynx were anesthetized with topical 4% lidocaine with 0.25% phenylephrine atomizer.  Examination of the base of tongue was performed first.  Attention was directed to any evidence of masses in the area or evidence of leukoplakia or candidal infection.  Attention was directed to the epiglottis where its size and position was determined and its movement on phonation of the vowel  e .  The piriform sinuses were then inspected for any mass lesions or pooling of secretions.  Attention was then directed to the larynx. The vocal folds were inspected for infection or any areas of leukoplakia, for masses, polypoid degeneration, or hemorrhage.  Having done this, the arytenoids and vocal processes were inspected for erythema or evidence of granuloma formation.  The posterior commissure was then inspected for evidence of inflammatory changes in the mucosa and heaping up of mucosal tissue. The patient was then instructed to say the vowel  e .  Adduction of vocal folds to the midline was observed for any evidence of paresis or paralysis of the larynx or asymmetry in rotation of the larynx to the left or right. The patient was asked to breathe and the degree of abduction was noted bilaterally.  Subglottic view of the larynx was obtained for any additional mass lesions or mucosal changes.  Finally the post cricoid was examined for evidence of pooling of secretions, as well as the pharyngeal wall mucosa.   Anesthesia type: 0.25%  phenylephrine    Findings:  Anatomic/physiological deviations: RNC, left vocal fold paresis, bowing, still some glottic gap   Right vocal process: No restriction of mobility   Left vocal process: Some restriction of mobility  Glottal gap: Glottal gap  Supraglottic structures: Normal  Hypopharynx: Normal     Estimated Blood Loss: minimal  Complications: None  Disposition: Patient tolerated the procedure well            Fiberoptic Endoscopic Evaluation of Swallowing (CPT 57852)  and Interpretation of Swallowing (CPT 23894)    Indications: See above notes for complete history and physical. Patient complains of dysphagia to both solids and liquids and/or there is suggestion on history and endoscopic exam of the presence of dysphagia causing medical complaints.  Swallowing evaluation is being performed to assess the presence and degree of dysphagia, and to recommend a safe diet.     Pulmonary Status:  No PNA   Current Diet:              regular                                        thin liquids      Consistency Amounts:  Thin Liquid: sip   Puree: 1 tsp  Solid: cookies            Positioning: upright in a chair  Oral Peripheral Exam: See physical exam section.  Anatomic Notes: See Videostroboscopy report for assessment of anatomy and laryngeal functioning  Pharyngeal secretions prior to administration of liquid or food: No  Oral Phase Abnormal Findings: No abnormal behavior observed  Behavioral Adaptations: No abnormal behavior observed  Pharyngeal Phase Abnormal Findings: penetration with thins, did cough    Recommended Diet:  regular                                        thin liquids           Review of Relevant Clinical Data   I personally reviewed:  Notes:   1/19/23 note with Charley Humphreys MD (general surgery)  HPI:  Saloni is here for follow up after left thyroid lobectomy and isthmusectomy 2 weeks ago. She reports good energy and no pain. 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  The nerve remains physically intact, but suffered either a stretch or thermal injury and stopped conducting. As such, she has a hoarse voice. She says that her voice strength fluctuates throughout the day. She feels the need to clear her throat often now.     Assessment and Plan:  Saloni is recovering after left thyroid lobectomy and isthmusectomy. 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 Typically, I would recommend proceeding with completion thyroidectomy with this pathology so that she would have the option of radioactive iodine and for ease of future surveillance. However, with her left vocal cord paralysis, I recommend not proceeding with any right-sided thyroid surgery unless nerve function returns.     We discussed the nature of her RLN injury and how stretch/thermal injuries can resolve with time, but that it might take months. There is also a chance that this vocal cord paralysis will be permanent. I would like her to consult with a laryngologist who can assess her vocal cord function over time and provide therapeutic options to help her regain some voice strength.  We have briefly discussed the procedure of vocal cord injection.  I have also placed a referral to speech therapy.       If her left RLN regains function, we should consider completion thyroidectomy for her follicular thyroid cancer. If her RLN laryngeal nerve does not regain function then our options would be simply observation, completion thyroidectomy despite the high surgical risk (not recommended), or I131 ablation of the remaining lobe. While not standard treatment for differentiated thyroid cancer, lobar ablation with I131 has been described in small studies and is feasible in patients with small thyroid lobes without nodules, such as Ms. Norton. At this point, we need to give the nerve  time to declare itself.      She needs her TSH checked in 4-6 weeks to assess for post-operative hypothyroidism. She is currently clinically euthyroid and feeling well. She has asked for a referral for another endocrinologist closer to home and this referral has been made.  I will follow up with her by phone when her TSH has returned to discuss next steps. I will touch base with her endocrinologist at that time to discuss the options mentioned above. We will plan for follow up after she has met with the laryngologist as well.    1/4/23  Procedure note - left thyroid lobectomy, isthmusectomy    Radiology:   US Head Neck (12/14/22)  IMPRESSION: Bilateral neck lymph nodes with examples as above. A grouping of small subcentimeter lymph nodes at the left neck zone 1 are rounded in morphology without conspicuous fatty shravan and are therefore indeterminant.    Labs:  Lab Results   Component Value Date    TSH 2.40 02/12/2023     Lab Results   Component Value Date     12/27/2022    CO2 25 12/27/2022    BUN 17.9 12/27/2022     Lab Results   Component Value Date    WBC 7.7 12/27/2022    HGB 14.2 12/27/2022    HCT 41.8 12/27/2022    MCV 84 12/27/2022     12/27/2022     No results found for: PT, PTT, INR  No results found for: BEAU  No components found for: RHEUMATOIDFACTOR,  RF  No results found for: CRP  No components found for: CKTOT, URICACID  No components found for: C3, C4, DSDNAAB, NDNAABIFA  No results found for: MPOAB    Patient reported Quality of Life (QOL) Measures   Patient Supplied Answers To VHI Questionnaire  Voice Handicap Index (VHI-10) 2/17/2023   My voice makes it difficult for people to hear me 3   People have difficulty understanding me in a noisy room 3   My voice difficulties restrict my personal and social life.  2   I feel left out of conversations because of my voice 1   My voice problem causes me to lose income 1   I feel as though I have to strain to produce voice 1   The clarity of my  "voice is unpredictable 2   My voice problem upsets me 1   My voice makes me feel handicapped 1   People ask, \"What's wrong with your voice?\" 3   VHI-10 18         Patient Supplied Answers To EAT Questionnaire  Eating Assessment Tool (EAT-10) 2023   My swallowing problem has caused me to lose weight 0   My swallowing problem interferes with my ability to go out for meals 0   Swallowing liquids takes extra effort 0   Swallowing solids takes extra effort 0   Swallowing pills takes extra effort 0   Swallowing is painful 0   The pleasure of eating is affected by my swallowing 0   When I swallow food sticks in my throat 0   I cough when I eat 0   Swallowing is stressful 0   EAT-10 0         Patient Supplied Answers To CSI Questionnaire  Cough Severity Index (CSI) 2023   My cough is worse when I lie down 0   My coughing problem causes me to restrict my personal and social life 0   I tend to avoid places because of my cough problem 0   I feel embarrassed because of my coughing problem 0   People ask, ''What's wrong?'' because I cough a lot 0   I run out of air when I cough 0   My coughing problem affects my voice 0   My coughing problem limits my physical activity 0   My coughing problem upsets me 0   People ask me if I am sick because I cough a lot 0   CSI Score 0         Patient Supplied Answers to Dyspnea Index Questionnaire:  No flowsheet data found.    Impression & Plan     IMPRESSION: Ms. Norton is a 47 year old female who is being seen for the followin. Dysphonia  - in the setting of left thyroid lobectomy and isthmusectomy 23  - for a month after surgery, unable to talk  - her voice returned on 23   - since then, has been improving consistently  - almost fully back to normal  - scope shows left vocal fold paresis, bowing, still some glottic gap  - - discussed etiology of vocal fold paralysis in this case is due to surgery  - discussed prognosis of vocal fold paralysis is good since she " already has some movement in her vocal fold    - discussed options of treatment which include observation vs voice therapy  - given she does not have pain at this time - will proceed with observation  - given she still has a glottic gap would continue voice use limitation at work   - will provide a letter of accommodation to limit voice use at work, especially phone conversation  Plan  - will send letter via [employer] - continue current voice rest for another month, then gradual increase by starting with 1 call per hour for a week, then slowly increasing to 1 call per half an hour as tolerated  - follow up in 8 weeks    2. Dysphagia  - initially after surgery, had difficulty with water  - now swallow is better  - FEES shows penetration with thins, did cough  - symptoms likely due to glottic insufficiency, discussed swallow precautions   Plan  - swallow precautions    RETURN VISIT: 4/20/23 at 8 AM     Thank you for the kind referral and for allowing me to share in the care of Ms. Norton. If you have any questions, please do not hesitate to contact me.    Adali Schafer MD    Laryngology    [facility]  Department of  Otolaryngology - Head and Neck Surgery  Clinics & Surgery Center  [address] [place] [address]  Appointment line: [phone]  Fax: [phone]  [email or web address]   ILois, am serving as a scribe to document services personally performed by Adali Schafer MD at this visit, based upon the provider's statements to me. All documentation has been reviewed by the aforementioned provider prior to being entered into the official medical record.

## 2023-02-24 NOTE — LETTER
"2/24/2023     RE: Saloni Norton  [id number] [facility] [address]     Dear Colleague,    Thank you for referring your patient, Saloni Norton, to the [facility] VOICE CLINIC [place] [facility]. Please see a copy of my visit note below.    [facility]  Kael Marrero Jr., M.D., F.A.C.S.  Lisa Persaud M.D., M.P.H.  Adali Schafer M.D.  Nereida Ulloa, Ph.D., CCC-SLP  Luis E Day, Ph.D., CCC-SLP  Alanna Veliz M.M. (voice), M.A., CCC-SLP  Michaela Bustillos M.S., CCC-SLP  KEO GallardoS., CCC-SLP  DYLON Hinojosa (voice), M.S., CCC-SLP    [facility]  VOICE/SPEECH/BREATHING EVALUATION AND LARYNGEAL EXAMINATION REPORT    Patient: Saloni Norton  Date of Visit: 2/24/2023    Clinician: Nereida Ulloa, Ph.D., CCC/SLP  Seen in conjunction with: Dr. Adali Schafer  We are also joined by Swallow Specialty SLP Inés Navarrete  Referring Physician: Dr. Charley Humphreys    CHIEF COMPLAINT:  Voice     HISTORY  Saloni Norton is a 47 year old presenting today for evaluation of dysphonia, and for laryngeal examination.    Please refer to Dr. Schafer s dictation for a more complete history and impressions.   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    Future treatment for thyroid CA depends upon resolution of the left nerve injury    Progression: improving    Voice \"came back\" four days ago    History of intervention: none- referred here    Current Symptoms:    Voice is slightly weak and rough; still not normal    Improving every day, and close enough to normal; no longer a problem for her    Improves with: n/a    Worsens with: n/a    Vocal demand:    Heavy phone use in her job, but has " not been doing this part of her job because of voice problem    Never sang    COUGH/THROAT CLEARING    No complaints    SWALLOWING    Some problems at first; now doing well    BREATHING    No complaints      OTHER PERTINENT HISTORY    Unknown; please refer to Dr. Schafer's note    OBJECTIVE FINDINGS  PERCEPTUAL EVALUATION (29711)  VOICE/ SPEECH/ NON-COMMUNICATIVE LARYNGEAL BEHAVIORS EVALUATION  An evaluation of the voice was accomplished today; salient features are as follows:    Palpation of the laryngeal area shows:    No tenderness of the thyrohyoid area    Breathing pattern:     appears within normal limits and adequate    No overt tension is evident.    Voice quality is characterized by:    Mild intermittent breathiness    Occasional breathy phonation breaks    Occasional diplophonia    Voice sounds like it is produced in upper register production, though pitch is normal for lower register    Habitual pitch was not formally tested, but is judged to be WNL and appropriate    sounds higher due to mild breathiness and upper register production    Loudness is adequate for the quiet setting, but overall mildly reduced    Sustained vowels:     Comfortable pitch /i/: mild breathiness and occasional diplophonia, but can be WNL    A singing task shows voice quality is consistent between speech and singing: WNL    In a pitch glide task to determine pitch range, she demonstrates WNL pitch range and apparent neurological integrity, despite poor skill for the task    LARYNGEAL EXAMINATION (85721)  Endoscopic laryngeal examination was performed by:  Dr. Schafer  I provided the protocol of instructions for the patient.  Type of exam:   Flexible endoscopy with chip-tip technology     This exam shows:  Laryngeal and Vocal Fold Mucosa    Essentially healthy laryngeal mucosa    Presence of secretions is unremarkable; no pooling.    Status of vocal fold mucosa:   o Within normal limits, with no lesions and straight vibratory  margins    Neurological and Functional Integrity of the Larynx    Left vocal fold is barely mobile at the paramedian position  o There is some mild movement of the vocal fold  o It generally doesn't come to the midline  o Left vocal fold is often bowed, but this is variable    Right vocal fold comes to midline or crosses midline to provide weak glottic closure  o There is often a mild spindle-shaped glottic gap    Airway is adequate    Left arytenoid position is mildly droopy    Adequte function is evident during a task of 20 quickly repeated vowels  o Right vocal fold moves well, and left holds its own with some minimal movement    Elongation of the vocal folds for pitch increase is only fair, but demonstrates apparent neurological integrity    On phonation, glottic closure is most often mildly weak, with a mild spindle-shaped gap    Supraglottic Function and Therapy Probes    Marked four-way constriction of the supraglottic larynx during connected speech    Therapy probes show   o Improved glottic closure with gentle glottic coup    Stroboscopy was not warranted.    Dr. Schafer and I reviewed this laryngeal exam with Ms. Norton today, and I provided pertinent explanations:    Endoscopic findings are consistent with audio-perceptual assessment and patient Hx/complaints.    her symptoms are accounted for by persistent poor mobility of the left vocal fold, although this appears to be an improvement over her previous status    It appears she is starting to have a return of neurologcal function, but the extent and timing cannot be predicted     At this point, no medical or functional treatment is warranted; it is prudent to wait for further recover.    We did provide guidance for voice use and precautions for returning to work    She should stay off the phones for another few weeks, then gradually ramp up her voice use, as tolerated    ASSESSMENT / PLAN  IMPRESSIONS:  This evaluation has resulted in the following  diagnosis/diagnoses for Ms. Norton  Dysphonia (R49.0)  Vocal Fold Paresis - Unilateral left (J38.00)     Laryngeal evaluation demonstrated a left vocal fold that is mostyl immobile, but does have some minimal movement and apparent tone; resulting glottic closure is weak; there is also marked 4-way supraglottic constriction    Perceptual evaluation demonstrated mildly weak and breathy voice quality, with variable diplophonia and apparent lack of lower register producction  RECOMMENDATIONS:     No functional therapy is warranted at t his time, as we can expect continuing improvement    We did, however, provide guidance for continued voice conservation as her function returns    I explained this is to prevent development of compensatory hyperfunction    She should stay off the phones for another few weeks, then gradually increase voice use    I will see Ms. Norton as Dr. Schafer deems appropriate.    Research: This patient is was not approached about participation in research.   Next Clinic Appt: 4/20/23  SLP Plan: reassess if voice is not back to normal    TOTAL SERVICE TIME: 20 minutes  EVALUATION OF VOICE AND RESONANCE (71900)      Nereida Ulloa, Ph.D., [facility]-SLP  Speech-Language Pathologist  Director, [facility]  She/her/hers  [phone]

## 2023-02-24 NOTE — LETTER
February 24, 2023    RE:  Saloni Norton                              [address] [facility] [address]      To Whom It May Concern:    This letter is written to document that Saloni Norton is under the medical care of Dr. Adali Schafer of the [facility] at the [facility] outpatient clinics.    Saloni will need to continue current voice rest restrictions for another month. She will then be able to gradually increase her voice use by starting with 1 call per hour for a week, then slowly increasing 1 call per half hour as tolerated.    Please take the above information into consideration when determining Saloni's future work schedule. If there are questions or concerns regarding the plan of care, please contact the [facility] Clinic at [phone].    Sincerely,    Adali Schafer MD    Laryngology    [facility]  Department of  Otolaryngology - Head and Neck Surgery  Clinics & Surgery Center  [address] [place] [id number]  Appointment line: [phone]  Fax: [phone]

## 2023-02-25 ENCOUNTER — LAB (OUTPATIENT)
Dept: LAB | Facility: CLINIC | Age: 48
End: 2023-02-25
Payer: COMMERCIAL

## 2023-02-25 DIAGNOSIS — Z11.59 NEED FOR HEPATITIS C SCREENING TEST: Primary | ICD-10-CM

## 2023-02-25 DIAGNOSIS — E89.0 HISTORY OF PARTIAL THYROIDECTOMY: ICD-10-CM

## 2023-02-25 DIAGNOSIS — C73 FOLLICULAR CARCINOMA OF THYROID (H): ICD-10-CM

## 2023-02-25 PROCEDURE — 36415 COLL VENOUS BLD VENIPUNCTURE: CPT

## 2023-02-25 PROCEDURE — 84439 ASSAY OF FREE THYROXINE: CPT

## 2023-02-25 PROCEDURE — 86803 HEPATITIS C AB TEST: CPT

## 2023-02-25 PROCEDURE — 86800 THYROGLOBULIN ANTIBODY: CPT | Mod: 90

## 2023-02-25 PROCEDURE — 99000 SPECIMEN HANDLING OFFICE-LAB: CPT

## 2023-02-25 PROCEDURE — 84432 ASSAY OF THYROGLOBULIN: CPT | Mod: 90

## 2023-02-25 PROCEDURE — 84443 ASSAY THYROID STIM HORMONE: CPT

## 2023-02-26 LAB
T4 FREE SERPL-MCNC: 1.17 NG/DL (ref 0.9–1.7)
TSH SERPL DL<=0.005 MIU/L-ACNC: 3.8 UIU/ML (ref 0.3–4.2)

## 2023-02-27 DIAGNOSIS — C73 FOLLICULAR CARCINOMA OF THYROID (H): Primary | ICD-10-CM

## 2023-02-27 DIAGNOSIS — E89.0 HISTORY OF PARTIAL THYROIDECTOMY: ICD-10-CM

## 2023-02-27 LAB — HCV AB SERPL QL IA: NONREACTIVE

## 2023-02-27 RX ORDER — LEVOTHYROXINE SODIUM 100 UG/1
TABLET ORAL
Qty: 90 TABLET | Refills: 4 | Status: SHIPPED | OUTPATIENT
Start: 2023-02-27 | End: 2023-03-09

## 2023-02-27 NOTE — PROGRESS NOTES
"     Speech-Language Pathology Department   EVALUATION  [facility]  Clinical swallow evaluation with endoscopic view provided by MD      02/24/23 1600       Present No   General Information   Type Of Visit Initial   Start Of Care Date 02/24/23   Referring Physician Dr. Adali Schafer   Orders Evaluate And Treat   Orders Comment Clinical swallow evaluation with endoscopic view provided by ENT   Medical Diagnosis left vocal fold paresis   Onset Of Illness/injury Or Date Of Surgery 01/04/23   Precautions/limitations Swallowing Precautions   Hearing Adequate in quiet setting   Pertinent History of Current Problem/OT: Additional Occupational Profile Info Elisabet Norton is a 47-year-old woman who underwent left thyroid lobectomy and isthmusectomy on 1/4/23 with noted trouble voicing after surgery. She reported no troubles swallowing or voicing prior to surgery. She said she woke up from surgery without a voice and noted trouble swallowing liquids for a few days. Swallowing improved but voicing did not. Then, on Monday 2/20/23 she said her \"voice returned\". She was able to yell at her  on a different floor and be heard. She has noted improvement in voicing every day feeling it get stronger and stronger. She denies trouble swallowing at this time however due to her vocal fold paresis, Dr. Schafer wanted to formally assess swallow safety and efficiency.   Respiratory Status Room air   Prior Level Of Function Swallowing   Prior Level Of Function Comment Pt reports trouble swallowing initally after surgery however this improved. She did report occasional choking if she takes \"too bit of a sip\".   Patient Role/employment History Employed   Home/community Accessibility Comments (flowsheet Row) Independent   General Observations Pt highly pleasant and cooperative.   Patient/family Goals No goals stated.   Clinical Swallow Evaluation   Oral Musculature generally " intact   Dentition present and adequate   Mucosal Quality good   Mandibular Strength and Mobility intact   Oral Labial Strength and Mobility WFL   Lingual Strength and Mobility WFL   Velar Elevation intact   Buccal Strength and Mobility intact   Laryngeal Function Cough;Throat clear;Voicing initiated;Swallow   Oral Musculature Comments Pt has left vocal fold paresis following procedure. It is demonstrating increased function during today's assessment per Dr. Schafer.   Clinical Swallow Eval: Thin Liquid Texture Trial   Mode of Presentation, Thin Liquids self-fed;straw   Volume of Liquid or Food Presented 4 oz blue tinged milk   Oral Phase of Swallow WFL   Pharyngeal Phase of Swallow impaired   Diagnostic Statement Pt had trace penetration noted on thin liquid trials intermittently with spontaneous cough. No aspriation identified. No pharyngeal residue after completed swallow.   Clinical Swallow Eval: Puree Solid Texture Trial   Mode of Presentation, Puree spoon;fed by clinician   Volume of Puree Presented blue tinged applesauce x2   Oral Phase, Puree WFL   Pharyngeal Phase, Puree intact   Diagnostic Statement No aspiration/penetration noted on puree trials. Pt demonstrates no pharyngeal residue after the completed swallow. Timely swallow noted. Adequate oral manipulation noted. No oral residue.   Clinical Swallow Eval: Regular (Solid)   Mode of Presentation self-fed   Volume Presented 1 Maia Doone cookie   Oral Phase WFL   Pharyngeal Phase intact   Diagnostic Statement No aspiration/penetration noted on cookie consistency trial. No pharyngeal residue after completed swallow.   Esophageal Phase of Swallow   Patient reports or presents with symptoms of esophageal dysphagia No   Swallow Eval: Clinical Impressions   Skilled Criteria for Therapy Intervention Other (see comments)  (Repeat swallow evaluation upon next ENT visit)   Functional Assessment Scale (FAS) 5   Treatment Diagnosis Mild pharyngeal dysphagia   Diet  texture recommendations Regular diet;Thin liquids (level 0)   Recommended Feeding/Eating Techniques alternate between small bites and sips of food/liquid;hard swallow w/ each bite or sip;small sips/bites;maintain upright posture during/after eating for 30 mins   Predicted Duration of Therapy Intervention (days/wks) Evaluation only   Risks and Benefits of Treatment have been explained. Yes   Patient, family and/or staff in agreement with Plan of Care Yes   Clinical Impression Comments Overall, patient demonstrates mild pharyngeal dysphagia as characterized by penetration on thin liquid trials with spontaneous cough.  No aspiration identified.  Penetration likely due to glottic gap.  No pharyngeal residue noted on purée or cookie consistencies.  No residue noted in the vallecula or piriform sinus on thin liquid trials.  Adequate range of motion and strength of oral mechanism demonstrated.  Patient has left vocal fold paresis with decreased mobility and vocal fold bowing per Dr. Schafer.  Recommend she continue with regular consistency solids and thin liquids.  Encouraged her to sit upright for all p.o. intake.  Alternate bites and sips.  She will take small sips of liquid as this is safest.  She will benefit from repeat evaluation at next ENT clinic visit pending progress in her voice.  She may benefit from video swallow study if her swallow decreases and/or changes.   Total Session Time   SLP Eval: oral/pharyngeal swallow function, clinical minutes (25508) 12   Total Evaluation Time 12     Thank you for the referral of Saloni Norton. If you have any questions about this report, please contact me using the information below.      Inés Navarrete MS, CCC-SLP  Speech-Language Pathology  [facility]  Department of Otolaryngology/D&T - 4th floor  Phone: [phone]  Email: [email or web address]

## 2023-03-01 NOTE — PROGRESS NOTES
"[facility] VOICE [facility]  Kael Marrero Jr., M.D., F.A.C.S.  Lisa Persaud M.D., M.P.H.  Adali Schafer M.D.  Nereida Ulloa, Ph.D., CCC-SLP  Luis E Day, Ph.D., CCC-SLP  Alanna Veliz M.M. (voice), M.A., CCC-SLP  Michaela Bustillos M.S., CCC-SLP  Christa Thompson M.S., CCC-SLP  DYLON Hinojosa (voice), M.S., CCC-SLP    [facility]  VOICE/SPEECH/BREATHING EVALUATION AND LARYNGEAL EXAMINATION REPORT    Patient: Saloni Norton  Date of Visit: 2/24/2023    Clinician: Nereida Ulloa, Ph.D., CCC/SLP  Seen in conjunction with: Dr. Adali Schafer  We are also joined by Swallow Specialty SLP Inés Navarrete  Referring Physician: Dr. Charley Humphreys    CHIEF COMPLAINT:  Voice     HISTORY  Saloni Norton is a 47 year old presenting today for evaluation of dysphonia, and for laryngeal examination.    Please refer to Dr. Schafer s dictation for a more complete history and impressions.   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    Future treatment for thyroid CA depends upon resolution of the left nerve injury    Progression: improving    Voice \"came back\" four days ago    History of intervention: none- referred here    Current Symptoms:    Voice is slightly weak and rough; still not normal    Improving every day, and close enough to normal; no longer a problem for her    Improves with: n/a    Worsens with: n/a    Vocal demand:    Heavy phone use in her job, but has not been doing this part of her job because of voice problem    Never sang    COUGH/THROAT CLEARING    No complaints    SWALLOWING    Some problems at first; now doing well    BREATHING    No complaints      OTHER PERTINENT HISTORY    Unknown; please refer to Dr. Schafer's note    OBJECTIVE FINDINGS  PERCEPTUAL " EVALUATION (10775)  VOICE/ SPEECH/ NON-COMMUNICATIVE LARYNGEAL BEHAVIORS EVALUATION  An evaluation of the voice was accomplished today; salient features are as follows:    Palpation of the laryngeal area shows:    No tenderness of the thyrohyoid area    Breathing pattern:     appears within normal limits and adequate    No overt tension is evident.    Voice quality is characterized by:    Mild intermittent breathiness    Occasional breathy phonation breaks    Occasional diplophonia    Voice sounds like it is produced in upper register production, though pitch is normal for lower register    Habitual pitch was not formally tested, but is judged to be WNL and appropriate    sounds higher due to mild breathiness and upper register production    Loudness is adequate for the quiet setting, but overall mildly reduced    Sustained vowels:     Comfortable pitch /i/: mild breathiness and occasional diplophonia, but can be WNL    A singing task shows voice quality is consistent between speech and singing: WNL    In a pitch glide task to determine pitch range, she demonstrates WNL pitch range and apparent neurological integrity, despite poor skill for the task    LARYNGEAL EXAMINATION (83561)  Endoscopic laryngeal examination was performed by:  Dr. Schafer  I provided the protocol of instructions for the patient.  Type of exam:   Flexible endoscopy with chip-tip technology     This exam shows:  Laryngeal and Vocal Fold Mucosa    Essentially healthy laryngeal mucosa    Presence of secretions is unremarkable; no pooling.    Status of vocal fold mucosa:   o Within normal limits, with no lesions and straight vibratory margins    Neurological and Functional Integrity of the Larynx    Left vocal fold is barely mobile at the paramedian position  o There is some mild movement of the vocal fold  o It generally doesn't come to the midline  o Left vocal fold is often bowed, but this is variable    Right vocal fold comes to midline or  crosses midline to provide weak glottic closure  o There is often a mild spindle-shaped glottic gap    Airway is adequate    Left arytenoid position is mildly droopy    Adequte function is evident during a task of 20 quickly repeated vowels  o Right vocal fold moves well, and left holds its own with some minimal movement    Elongation of the vocal folds for pitch increase is only fair, but demonstrates apparent neurological integrity    On phonation, glottic closure is most often mildly weak, with a mild spindle-shaped gap    Supraglottic Function and Therapy Probes    Marked four-way constriction of the supraglottic larynx during connected speech    Therapy probes show   o Improved glottic closure with gentle glottic coup    Stroboscopy was not warranted.    Dr. Schafer and I reviewed this laryngeal exam with Ms. Norton today, and I provided pertinent explanations:    Endoscopic findings are consistent with audio-perceptual assessment and patient Hx/complaints.    her symptoms are accounted for by persistent poor mobility of the left vocal fold, although this appears to be an improvement over her previous status    It appears she is starting to have a return of neurologcal function, but the extent and timing cannot be predicted     At this point, no medical or functional treatment is warranted; it is prudent to wait for further recover.    We did provide guidance for voice use and precautions for returning to work    She should stay off the phones for another few weeks, then gradually ramp up her voice use, as tolerated    ASSESSMENT / PLAN  IMPRESSIONS:  This evaluation has resulted in the following diagnosis/diagnoses for Ms. Norton  Dysphonia (R49.0)  Vocal Fold Paresis - Unilateral left (J38.00)     Laryngeal evaluation demonstrated a left vocal fold that is mostyl immobile, but does have some minimal movement and apparent tone; resulting glottic closure is weak; there is also marked 4-way supraglottic  constriction    Perceptual evaluation demonstrated mildly weak and breathy voice quality, with variable diplophonia and apparent lack of lower register producction  RECOMMENDATIONS:     No functional therapy is warranted at t his time, as we can expect continuing improvement    We did, however, provide guidance for continued voice conservation as her function returns    I explained this is to prevent development of compensatory hyperfunction    She should stay off the phones for another few weeks, then gradually increase voice use    I will see Ms. Norton as Dr. Schafer deems appropriate.    Research: This patient is was not approached about participation in research.   Next Clinic Appt: 4/20/23  SLP Plan: reassess if voice is not back to normal    TOTAL SERVICE TIME: 20 minutes  EVALUATION OF VOICE AND RESONANCE (06416)      Nereida Ulloa, Ph.D., [facility]-SLP  Speech-Language Pathologist  Director, [facility]  She/her/hers  [phone]

## 2023-03-03 LAB — SCANNED LAB RESULT: NORMAL

## 2023-03-08 ENCOUNTER — TELEPHONE (OUTPATIENT)
Dept: ENDOCRINOLOGY | Facility: CLINIC | Age: 48
End: 2023-03-08
Payer: COMMERCIAL

## 2023-03-08 NOTE — TELEPHONE ENCOUNTER
Spoke to pt - pt is scheduled for the CT ordered by Dr. Clarke 3/17/2023. Pt stated that she completed labs 2/20/2023. Pt stated that the appt that is scheduled with Dr. Perez is for a second opinion as she was told her by her PCP to get other opinions. Pt was scheduled for a 6 month f/up on 8/22/2023 per the checkout orders from visit on 2/20/2023.     Natalie Meraz on 3/8/2023 at 2:32 PM

## 2023-03-09 DIAGNOSIS — E89.0 HISTORY OF PARTIAL THYROIDECTOMY: ICD-10-CM

## 2023-03-09 DIAGNOSIS — C73 FOLLICULAR CARCINOMA OF THYROID (H): ICD-10-CM

## 2023-03-09 RX ORDER — LEVOTHYROXINE SODIUM 100 UG/1
TABLET ORAL
Qty: 90 TABLET | Refills: 4 | Status: ON HOLD | OUTPATIENT
Start: 2023-03-09 | End: 2023-08-30

## 2023-03-17 ENCOUNTER — HOSPITAL ENCOUNTER (OUTPATIENT)
Dept: CT IMAGING | Facility: CLINIC | Age: 48
Discharge: HOME | End: 2023-03-17
Payer: COMMERCIAL

## 2023-03-17 DIAGNOSIS — E89.0 HISTORY OF PARTIAL THYROIDECTOMY: ICD-10-CM

## 2023-03-17 DIAGNOSIS — C73 FOLLICULAR CARCINOMA OF THYROID (H): Primary | ICD-10-CM

## 2023-03-17 DIAGNOSIS — C73 FOLLICULAR CARCINOMA OF THYROID (H): ICD-10-CM

## 2023-03-17 DIAGNOSIS — R91.8 PULMONARY NODULES: ICD-10-CM

## 2023-03-17 PROCEDURE — 71250 CT THORAX DX C-: CPT

## 2023-04-13 ENCOUNTER — DOCUMENTATION ONLY (OUTPATIENT)
Dept: LAB | Facility: CLINIC | Age: 48
End: 2023-04-13
Payer: COMMERCIAL

## 2023-04-13 NOTE — PROGRESS NOTES
PT HAS LAB VISIT ON 4/16.  SHE IS TOO EARLY FOR HER LABS.  LABS ARE EXPECTED ON 4/27 AND NO EARLIER, PLEASE ADVISE PT

## 2023-04-13 NOTE — PROGRESS NOTES
Spoke w/ Pt: started Levothyroxine 100mcg 2023. Will cancel upcoming lab visit and reschedule per Dr Clarke's instructions.   Jenifer Dao, RN on 2023 at 11:44 AM         Marielle Villalobos 3 minutes ago (11:34 AM)     JK  PT HAS LAB VISIT ON .  SHE IS TOO EARLY FOR HER LABS.  LABS ARE EXPECTED ON  AND NO EARLIER, PLEASE ADVISE PT        2023  Andreina Clarke MD  to Elisabet Norton          23 12:44 PM  I believe we discussed starting thyroid hormone in order to suppress the TSH as part of the treatment for thyroid cancer.     I recommend we start levothyroxine 50 mcg/day for 1 week, then increase to 100 mcg/day thereafter.   Repeat labs after you have bene on the full dose about 2 months     Outpatient Medication Detail     Disp Refills Start End LUIS   levothyroxine (SYNTHROID/LEVOTHROID) 100 MCG tablet 90 tablet 4 3/9/2023  No   Si mcg/day for one week, then 100 mcg/day thereafter   Sent to pharmacy as: Levothyroxine Sodium 100 MCG Oral Tablet (SYNTHROID/LEVOTHROID)

## 2023-04-18 NOTE — PROGRESS NOTES
[facility]   at the [facility]   Otolaryngology Clinic     Patient: Saolni Norton    MRN: [id number]    : 1975    Age/Gender: 47 year old female  Date of Service: 2023  Rendering Provider:   Adali Schafer MD     Chief Complaint   Left vocal fold paralysis  Dysphonia  Interval History   HISTORY OF PRESENT ILLNESS: Ms. Norton is a 47 year old female is being followed for dysphonia. she was initially seen on 23. Please refer to this note for full history.     Of note, the patient underwent left thyroid lobectomy and isthmusectomy on 23 after she was noted to have thyroid nodules with a 50% risk of malignancy.    Today, she presents for follow up. she reports:  - voice has returned   - very happy with voice   - able to use her phone  - couple weeks after her last visit improved   - swallow is going well   - sometimes has sensation of cyst on epiglottis   - feels like something is in her throat when she swallows   - doesn't bother her unless she thinks about it   - mostly notices when she is dehydrated  - if she drinks water this goes away      PAST MEDICAL HISTORY:   Past Medical History:   Diagnosis Date     Colon polyps      Diffuse cystic mastopathy     Fibrocystic breasts, asymmetric breast tissue     Follicular carcinoma of thyroid (H) 2023     History of radiation exposure     [place] age 12 [facility] HI-RISK PREG-YOUNG MULTIP 2006     Thyroid nodule      Tobacco use disorder     quit        PAST SURGICAL HISTORY:   Past Surgical History:   Procedure Laterality Date     BIOPSY  2022     THYROIDECTOMY Left 2023    Procedure: Left thyroid lobectomy and isthmusectomy;  Surgeon: Charley Humphreys MD;  Location: [facility] OR     TONSILLECTOMY  1980    unilateral (right) tonsillectomy       CURRENT MEDICATIONS:   Current Outpatient Medications:      levothyroxine (SYNTHROID/LEVOTHROID) 100 MCG tablet, 50 mcg/day for one week, then 100  mcg/day thereafter, Disp: 90 tablet, Rfl: 4    ALLERGIES: Patient has no known allergies.    SOCIAL HISTORY:    Social History     Socioeconomic History     Marital status:      Spouse name: Bishnu     Number of children: 1     Years of education: 16     Highest education level: Not on file   Occupational History     Occupation: Dental      Comment: [employer]   Tobacco Use     Smoking status: Former     Packs/day: 0.50     Years: 10.00     Pack years: 5.00     Types: Cigarettes     Quit date: 2004     Years since quittin.8     Smokeless tobacco: Never     Tobacco comments:     total smoking about 5 pack years   Vaping Use     Vaping status: Never Used   Substance and Sexual Activity     Alcohol use: Yes     Comment: less than monthly     Drug use: No     Sexual activity: Yes     Partners: Male     Birth control/protection: Condom   Other Topics Concern      Service No     Blood Transfusions No     Caffeine Concern Not Asked     Occupational Exposure Not Asked     Hobby Hazards Not Asked     Sleep Concern Not Asked     Stress Concern Not Asked     Weight Concern Not Asked     Special Diet Not Asked     Back Care Not Asked     Exercise Yes     Comment: about twice weekly     Bike Helmet Not Asked     Seat Belt Yes     Self-Exams Yes   Social History Narrative     Not on file     Social Determinants of Health     Financial Resource Strain: Low Risk  (2022)    Overall Financial Resource Strain (CARDIA)      Difficulty of Paying Living Expenses: Not hard at all   Food Insecurity: No Food Insecurity (2022)    Hunger Vital Sign      Worried About Running Out of Food in the Last Year: Never true      Ran Out of Food in the Last Year: Never true   Transportation Needs: No Transportation Needs (2022)    PRAPARE - Transportation      Lack of Transportation (Medical): No      Lack of Transportation (Non-Medical): No   Physical Activity: Insufficiently Active (2022)     Exercise Vital Sign      Days of Exercise per Week: 1 day      Minutes of Exercise per Session: 30 min   Stress: No Stress Concern Present (5/23/2022)    [place] [employer] - Occupational Stress Questionnaire      Feeling of Stress : Not at all   Social Connections: Moderately Integrated (5/23/2022)    Social Connection and Isolation Panel [NHANES]      Frequency of Communication with Friends and Family: More than three times a week      Frequency of Social Gatherings with Friends and Family: Once a week      Attends Anabaptism Services: Never      Active Member of Clubs or Organizations: Yes      Attends Club or Organization Meetings: Not on file      Marital Status:    Intimate Partner Violence: Not on file   Housing Stability: Unknown (5/23/2022)    Housing Stability Vital Sign      Unable to Pay for Housing in the Last Year: No      Number of Places Lived in the Last Year: Not on file      Unstable Housing in the Last Year: No         FAMILY HISTORY:   Family History   Problem Relation Age of Onset     Heart Disease Mother         pacemaker     Thyroid nodules Mother      Thyroid Disease Mother      Heart Disease Father      Diabetes Father      C.A.D. Father         MI age 62     Alzheimer Disease Father      Hypertension Father      Hyperlipidemia Father      Lipids Brother      Breast Cancer Maternal Grandmother         and Ailyn had post-menopausal cancer     Neurologic Disorder Paternal Grandmother         Alzheimer's     Lymphoma Cousin      Cancer - colorectal No family hx of      Thyroid Cancer No family hx of       Non-contributory for problems with anesthesia    REVIEW OF SYSTEMS:   The patient was asked a 14 point review of systems regarding constitutional symptoms, eye symptoms, ears, nose, mouth, throat symptoms, cardiovascular symptoms, respiratory symptoms, gastrointestinal symptoms, genitourinary symptoms, musculoskeletal symptoms, integumentary symptoms, neurological  symptoms, psychiatric symptoms, endocrine symptoms, hematologic/lymphatic symptoms, and allergic/ immunologic symptoms.   The pertinent factors have been included in the HPI and below.  Patient Supplied Answers to Review of Systems      4/14/2023    12:48 PM   [facility] ROS   Constitutional Weight gain       Physical Examination   The patient underwent a physical examination as described below. The pertinent positive and negative findings are summarized after the description of the examination.  Constitutional: The patient's developmental and nutritional status was assessed. The patient's voice quality was assessed.  Head and Face: The head and face were inspected for deformities. The sinuses were palpated. The salivary glands were palpated. Facial muscle strength was assessed bilaterally.  Eyes: Extraocular movements and primary gaze alignment were assessed.  Ears, Nose, Mouth and Throat: The ears and nose were examined for deformities. The nasal septum, mucosa, and turbinates were inspected by anterior rhinoscopy. The lips, teeth, and gums were examined for abnormalities. The oral mucosa, tongue, palate, tonsils, lateral and posterior pharynx were inspected for the presence of asymmetry or mucosal lesions.    Neck: The tracheal position was noted, and the neck mass palpated to determine if there were any asymmetries, abnormal neck masses, thyromegally, or thyroid nodules.  Respiratory: The nature of the breathing and chest expansion/symmetry was observed.  Cardiovascular: The patient was examined to determine the presence of any edema or jugular venous distension.  Abdomen: The contour of the abdomen was noted.  Lymphatic: The patient was examined for infraclavicular lymphadenopathy.  Musculoskeletal: The patient was inspected for the presence of skeletal deformities.  Extremities: The extremities were examined for any clubbing or cyanosis.  Skin: The skin was examined for inflammatory or neoplastic  conditions.  Neurologic: The patient's orientation, mood, and affect were noted. The cranial nerve  functions were examined.  Other pertinent positive and negative findings on physical examination:   OC/OP: no lesions, uvula midline, soft palate elevates symmetrically   Neck: no lesions, no TH tenderness to palpation, Midlinemneck incision well healed    All other physical examination findings were within normal limits and noncontributory.    Procedures   Flexible laryngoscopy (CPT 11175)        Pre-procedure diagnosis: dysphonia  Post-procedure diagnosis: same as above  Indication for procedure: Ms. Norton is a 47 year old female with see above  Procedure(s): Fiberoptic Laryngoscopy     Details of Procedure: After informed consent was obtained, the patient was seated in the examination chair.  The areas of the nasopharynx as well as the hypopharynx were anesthetized with topical 4% lidocaine with 0.25% phenylephrine atomizer.  Examination of the base of tongue was performed first.  Attention was directed to any evidence of masses in the area or evidence of leukoplakia or candidal infection.  Attention was directed to the epiglottis where its size and position was determined and its movement on phonation of the vowel  e .  The piriform sinuses were then inspected for any mass lesions or pooling of secretions.  Attention was then directed to the larynx. The vocal folds were inspected for infection or any areas of leukoplakia, for masses, polypoid degeneration, or hemorrhage.  Having done this, the arytenoids and vocal processes were inspected for erythema or evidence of granuloma formation.  The posterior commissure was then inspected for evidence of inflammatory changes in the mucosa and heaping up of mucosal tissue. The patient was then instructed to say the vowel  e .  Adduction of vocal folds to the midline was observed for any evidence of paresis or paralysis of the larynx or asymmetry in rotation of the larynx  to the left or right. The patient was asked to breathe and the degree of abduction was noted bilaterally.  Subglottic view of the larynx was obtained for any additional mass lesions or mucosal changes.  Finally the post cricoid was examined for evidence of pooling of secretions, as well as the pharyngeal wall mucosa.   Anesthesia type: 0.25% phenylephrine     Findings:  Anatomic/physiological deviations: RNC, left vocal fold with full motion now, good closer, right vocal fold with hypervascularity, stable epiglottic cyst                Right vocal process: No restriction of mobility   Left vocal process: No restriction of mobility  Glottal gap: Complete glottal closure   Supraglottic structures: Normal  Hypopharynx: Normal      Estimated Blood Loss: minimal  Complications: None  Disposition: Patient tolerated the procedure well                Fiberoptic Endoscopic Evaluation of Swallowing (CPT 73712)  and Interpretation of Swallowing (CPT 73149)     Indications: See above notes for complete history and physical. Patient complains of dysphagia to both solids and liquids and/or there is suggestion on history and endoscopic exam of the presence of dysphagia causing medical complaints.  Swallowing evaluation is being performed to assess the presence and degree of dysphagia, and to recommend a safe diet.     Pulmonary Status:        No PNA   Current Diet:                regular                                                    thin liquids      Consistency Amounts:  Thin Liquid: sip   Puree: none   Solid: none             Positioning: upright in a chair  Oral Peripheral Exam: See physical exam section.  Anatomic Notes: See Videostroboscopy report for assessment of anatomy and laryngeal functioning  Pharyngeal secretions prior to administration of liquid or food: No  Oral Phase Abnormal Findings: No abnormal behavior observed  Behavioral Adaptations: No abnormal behavior observed  Pharyngeal Phase Abnormal Findings: no  "penetration, no aspiration only did liquid       Recommended Diet:  regular                                        thin liquids     Review of Relevant Clinical Data   I personally reviewed:  Radiology:   CT chest 3/17/23  IMPRESSION:   1.  An indeterminate 3 mm right lower lobe nodule. Consider follow-up chest CT in 3 months to ensure stability.    Labs:  Lab Results   Component Value Date    TSH 3.80 02/25/2023     Lab Results   Component Value Date     12/27/2022    CO2 25 12/27/2022    BUN 17.9 12/27/2022     Lab Results   Component Value Date    WBC 7.7 12/27/2022    HGB 14.2 12/27/2022    HCT 41.8 12/27/2022    MCV 84 12/27/2022     12/27/2022     No results found for: PT, PTT, INR  No results found for: BEAU  No components found for: RHEUMATOIDFACTOR,  RF  No results found for: CRP  No components found for: CKTOT, URICACID  No components found for: C3, C4, DSDNAAB, NDNAABIFA  No results found for: MPOAB    Patient reported Quality of Life (QOL) Measures   Patient Supplied Answers To VHI Questionnaire      2/17/2023     4:37 PM   Voice Handicap Index (VHI-10)   My voice makes it difficult for people to hear me 3   People have difficulty understanding me in a noisy room 3   My voice difficulties restrict my personal and social life.  2   I feel left out of conversations because of my voice 1   My voice problem causes me to lose income 1   I feel as though I have to strain to produce voice 1   The clarity of my voice is unpredictable 2   My voice problem upsets me 1   My voice makes me feel handicapped 1   People ask, \"What's wrong with your voice?\" 3   VHI-10 18         Patient Supplied Answers To EAT Questionnaire      2/17/2023     4:38 PM   Eating Assessment Tool (EAT-10)   My swallowing problem has caused me to lose weight 0   My swallowing problem interferes with my ability to go out for meals 0   Swallowing liquids takes extra effort 0   Swallowing solids takes extra effort 0   Swallowing pills " takes extra effort 0   Swallowing is painful 0   The pleasure of eating is affected by my swallowing 0   When I swallow food sticks in my throat 0   I cough when I eat 0   Swallowing is stressful 0   EAT-10 0         Patient Supplied Answers To CSI Questionnaire      2023     4:38 PM   Cough Severity Index (CSI)   My cough is worse when I lie down 0   My coughing problem causes me to restrict my personal and social life 0   I tend to avoid places because of my cough problem 0   I feel embarrassed because of my coughing problem 0   People ask, ''What's wrong?'' because I cough a lot 0   I run out of air when I cough 0   My coughing problem affects my voice 0   My coughing problem limits my physical activity 0   My coughing problem upsets me 0   People ask me if I am sick because I cough a lot 0   CSI Score 0          Impression & Plan     IMPRESSION: Ms. Norton is a 47 year old female who is being seen for the followin. Dysphonia  - in the setting of left thyroid lobectomy and isthmusectomy 23  - for a month after surgery, unable to talk  - her voice returned on 23   - since then, has been improving consistently  - almost fully back to normal  - scope shows left vocal fold paresis, bowing, still some glottic gap  - discussed etiology of vocal fold paralysis in this case is due to surgery  - discussed prognosis of vocal fold paralysis is good since she already has some movement in her vocal fold    - discussed options of treatment which include observation vs voice therapy  - given she does not have pain at this time - will proceed with observation  - given she still has a glottic gap would continue voice use limitation at work   - will provide a letter of accommodation to limit voice use at work, especially phone conversation  - symptoms 23 are improved and she is able to use her phone   - scope shows left vocal fold with full motion now, good closer, right vocal fold with hypervascularity,  stable epiglottic cyst   - has full motion of her vocal fold now, discussed if she proceeds with another surgery again the risk is of unilateral paralysis rather than bilateral paralysis since her left vocal fold is now mobile   - has now sensation of something stuck in the throat, gets better with liquid drinks, thinks this is the cyst, discussed this is likely muscle tension, recommend observation of this and recheck of the cyst in 6 months   Plan  - observation         2. Dysphagia  - initially after surgery, had difficulty with water  - now swallow is better  - FEES shows penetration with thins, did cough  - symptoms likely due to glottic insufficiency, discussed swallow precautions   - symptoms 4/20/23 are stable  - FEES only did with thins, no penetration or aspiration    Plan  - resolved      RETURN VISIT: 6 months     Scribe Disclosure:  I, Roro Diamond am serving as a scribe to document services personally performed by Adali Schafer MD at this visit, based upon the provider's statements to me. All documentation has been reviewed by the aforementioned provider prior to being entered into the official medical record.     Adali Schafer MD    Laryngology    [facility]  Department of  Otolaryngology - Head and Neck Surgery  Clinics & Surgery Center  [address] [place] [address]  Appointment line: [phone]  Fax: [phone]  [email or web address]

## 2023-04-20 ENCOUNTER — OFFICE VISIT (OUTPATIENT)
Dept: OTOLARYNGOLOGY | Facility: CLINIC | Age: 48
End: 2023-04-20
Payer: COMMERCIAL

## 2023-04-20 ENCOUNTER — THERAPY VISIT (OUTPATIENT)
Dept: SPEECH THERAPY | Facility: CLINIC | Age: 48
End: 2023-04-20
Payer: COMMERCIAL

## 2023-04-20 VITALS
OXYGEN SATURATION: 98 % | HEIGHT: 64 IN | SYSTOLIC BLOOD PRESSURE: 130 MMHG | HEART RATE: 96 BPM | BODY MASS INDEX: 32.61 KG/M2 | WEIGHT: 191 LBS | DIASTOLIC BLOOD PRESSURE: 89 MMHG

## 2023-04-20 DIAGNOSIS — R49.0 DYSPHONIA: Primary | ICD-10-CM

## 2023-04-20 DIAGNOSIS — R13.12 OROPHARYNGEAL DYSPHAGIA: Primary | ICD-10-CM

## 2023-04-20 PROCEDURE — 99213 OFFICE O/P EST LOW 20 MIN: CPT | Mod: 25 | Performed by: OTOLARYNGOLOGY

## 2023-04-20 PROCEDURE — 92612 ENDOSCOPY SWALLOW (FEES) VID: CPT | Mod: GN | Performed by: OTOLARYNGOLOGY

## 2023-04-20 PROCEDURE — 92613 ENDOSCOPY SWALLOW (FEES) I&R: CPT | Performed by: OTOLARYNGOLOGY

## 2023-04-20 PROCEDURE — 99207 PR NO BILLABLE SERVICE THIS VISIT: CPT | Performed by: SPEECH-LANGUAGE PATHOLOGIST

## 2023-04-20 PROCEDURE — 92610 EVALUATE SWALLOWING FUNCTION: CPT | Mod: GN | Performed by: SPEECH-LANGUAGE PATHOLOGIST

## 2023-04-20 ASSESSMENT — PAIN SCALES - GENERAL: PAINLEVEL: NO PAIN (0)

## 2023-04-20 NOTE — PROGRESS NOTES
[facility]  Clinical Voice and Upper Airway Evaluation Report    Patient's Name: Saloni Norton  Date of Evaluation: 4/20/2023  Providing SLP: Christa Thompson MS CCC-SLP  Seen in Conjunction With: Adali Schafer MD  Evaluation Location: [facility]      Impressions from initial evaluation on 2/24/23 with DEANN Ulloa, PhD CCC-SLP:    Dysphonia (R49.0)  Vocal Fold Paresis - Unilateral left (J38.00)   ? Laryngeal evaluation demonstrated a left vocal fold that is mostyl immobile, but does have some minimal movement and apparent tone; resulting glottic closure is weak; there is also marked 4-way supraglottic constriction  ? Perceptual evaluation demonstrated mildly weak and breathy voice quality, with variable diplophonia and apparent lack of lower register producction  RECOMMENDATIONS:   ? No functional therapy is warranted at t his time, as we can expect continuing improvement  ? We did, however, provide guidance for continued voice conservation as her function returns  - I explained this is to prevent development of compensatory hyperfunction  - She should stay off the phones for another few weeks, then gradually increase voice use  I will see Ms. Norton as Dr. Schafer deems appropriate.      Impressions and plan from today's evaluation:    No voice SLP services were warranted for today's visit, resulting in a no charge facility fee. Dr. Schafer has recommended observation of her voice at this time. Elisabet and Dr. Schafer will let our team know if any voice/coughing/airway changes occur and if our services may be needed in the future.      Quality of Life Questionnaires:    Patient Supplied Answers To Last 2 VHI Questionnaires      2/17/2023     4:37 PM   Voice Handicap Index (VHI-10)   My voice makes it difficult for people to hear me 3   People have difficulty understanding me in a noisy room 3   My voice difficulties restrict my personal and social life.  2   I feel left out of  "conversations because of my voice 1   My voice problem causes me to lose income 1   I feel as though I have to strain to produce voice 1   The clarity of my voice is unpredictable 2   My voice problem upsets me 1   My voice makes me feel handicapped 1   People ask, \"What's wrong with your voice?\" 3   VHI-10 18     Patient Supplied Answers To Last 2 CSI Questionnaires      2023     4:38 PM   Cough Severity Index (CSI)   My cough is worse when I lie down 0   My coughing problem causes me to restrict my personal and social life 0   I tend to avoid places because of my cough problem 0   I feel embarrassed because of my coughing problem 0   People ask, ''What's wrong?'' because I cough a lot 0   I run out of air when I cough 0   My coughing problem affects my voice 0   My coughing problem limits my physical activity 0   My coughing problem upsets me 0   People ask me if I am sick because I cough a lot 0   CSI Score 0     Patient Supplied Answers To Last 2 EAT Questionnaires      2023     4:38 PM   Eating Assessment Tool (EAT-10)   My swallowing problem has caused me to lose weight 0   My swallowing problem interferes with my ability to go out for meals 0   Swallowing liquids takes extra effort 0   Swallowing solids takes extra effort 0   Swallowing pills takes extra effort 0   Swallowing is painful 0   The pleasure of eating is affected by my swallowing 0   When I swallow food sticks in my throat 0   I cough when I eat 0   Swallowing is stressful 0   EAT-10 0       Thank you for allowing me to participate in this patient's care,    Christa Thompson MS CCC-SLP  Speech-Language Pathologist  [facility]  Department of Otolaryngology - Head and Neck Surgery  [facility]  [email or web address]  Direct: [phone]  [phone]    *This note may have been completed using [id number] dictation software, so errors may exist. Please contact me for clarification if " needed*

## 2023-04-20 NOTE — PATIENT INSTRUCTIONS
1.  You were seen in the ENT Clinic today by . If you have any questions or concerns after your appointment, please call [phone]. Press option #1 for scheduling related needs. Press option #3 for Nurse advice.    2. Plan is to return to clinic in 6 months      Winsome Haddad LPN  [phone]  [facility] Ot[facility]

## 2023-04-20 NOTE — PROGRESS NOTES
"     Speech-Language Pathology Department   EVALUATION  [facility]  Clinical swallow evaluation with endoscopic view provided by ENT      04/20/23 0800   General Information   Type Of Visit Initial   Start Of Care Date 02/24/23   Referring Physician Dr. Adali Schafer   Orders Evaluate And Treat   Orders Comment Clinical swallow eval with endoscopic view provided by ENT   Medical Diagnosis dysphagia, vocal cord paresis   Onset Of Illness/injury Or Date Of Surgery 01/04/23   Precautions/limitations No Known Precautions/limitations   Hearing Adequate in quiet setting   Pertinent History of Current Problem/OT: Additional Occupational Profile Info Elisabet Norton is a 47-year-old woman who underwent left thyroid lobectomy and isthmusectomy on 1/4/23 with noted trouble voicing after surgery. She denied trouble swallowing upon her initial evaluation with Dr. Schafer on 2/24/23. She participated in endoscopic swallow evaluation at that time which demonstrated mild dysphagia characterized by penetration on thin liquids due to glottic gap. She is seen today for repeat evaluation reporting resolution of her vocal changes. She feels her voice is strong and she denies any trouble with swallowing. She has no episodes of liquid \"going down the wrong way\".   Respiratory Status Room air   Prior Level Of Function Swallowing   Prior Level Of Function Comment Regular solids and thin liquids.   Patient Role/employment History Employed   Home/community Accessibility Comments (flowsheet Row) Independent   General Observations Pt highly pleasant and cooperative   Patient/family Goals No goals stated at evaluation   Clinical Swallow Evaluation   Oral Musculature generally intact   Structural Abnormalities none present   Dentition present and adequate   Mucosal Quality good   Mandibular Strength and Mobility intact   Oral Labial Strength and Mobility WFL   Lingual Strength and Mobility WFL   Velar " Elevation intact   Buccal Strength and Mobility intact   Laryngeal Function Cough;Throat clear;Swallow;Voicing initiated   Clinical Swallow Eval: Thin Liquid Texture Trial   Mode of Presentation, Thin Liquids straw;fed by clinician   Volume of Liquid or Food Presented 3 oz blue tinged milk   Oral Phase of Swallow WFL   Pharyngeal Phase of Swallow intact   Diagnostic Statement No aspiration/penetration observed on endoscopic view of the swallow. Pt demonstrates improved airway protection due to improved glottic closure. No pharyngeal residue noted.   Swallow Compensations   Swallow Compensations No compensations were used   Esophageal Phase of Swallow   Patient reports or presents with symptoms of esophageal dysphagia No   Swallow Eval: Clinical Impressions   Skilled Criteria for Therapy Intervention No problems identified which require skilled intervention   Functional Assessment Scale (FAS) 7   Diet texture recommendations Thin liquids (level 0);Regular diet   Predicted Duration of Therapy Intervention (days/wks) Evaluation only   Risks and Benefits of Treatment have been explained. Yes   Patient, family and/or staff in agreement with Plan of Care Yes   Clinical Impression Comments Overall, pt demonstrates safe functional oropharyngeal swallow. She has complete recovery of vocal fold movement from previous paresis. She demonstrates adequate ROM and strength of oral mechanism. She has timely swallow on thin liquid trials. No aspiration/penetration noted, which is improved from previous evaluation as she demonstrated penetration on liquids during previous evaluation. She demonstrates no pharyngeal residue after the completed swallow. Only trials of liquid presented today as she had not had trouble with puree or solid on previous exam. Cyst remains present on the left side of the epiglottis. Pt reports feeling it sometimes but water sips clear this feeling. Cyst is not currently bolus hindering and there is no  pharyngeal residue around the area. Recommend she continue regular consistency solids and thin liquids. No further SLP services indicated at this time. No further evaluation indicated unless there are changes in swallow function.   Total Session Time   SLP Eval: oral/pharyngeal swallow function, clinical minutes (84607) 9   Total Evaluation Time 9     Thank you for the referral of Saloni Norton. If you have any questions about this report, please contact me using the information below.      Inés Navarrete MS, CCC-SLP  Speech-Language Pathology  [facility]  Department of Otolaryngology/D&T - 4th floor  Phone: [phone]  Email: [email or web address]

## 2023-04-20 NOTE — LETTER
2023       RE: Saloni Norton  [address] [facility] [address]     Dear Colleague,    Thank you for referring your patient, Saloni Norton, to the [facility] EAR NOSE AND THROAT CLINIC [place] at [facility]. Please see a copy of my visit note below.        [facility]   at the [facility]   Otolaryngology Clinic     Patient: Saloni Norton    MRN: [id number]    : 1975    Age/Gender: 47 year old female  Date of Service: 2023  Rendering Provider:   Adali Schafer MD     Chief Complaint   Left vocal fold paralysis  Dysphonia  Interval History   HISTORY OF PRESENT ILLNESS: Ms. Norton is a 47 year old female is being followed for dysphonia. she was initially seen on 23. Please refer to this note for full history.     Of note, the patient underwent left thyroid lobectomy and isthmusectomy on 23 after she was noted to have thyroid nodules with a 50% risk of malignancy.    Today, she presents for follow up. she reports:  - voice has returned   - very happy with voice   - able to use her phone  - couple weeks after her last visit improved   - swallow is going well   - sometimes has sensation of cyst on epiglottis   - feels like something is in her throat when she swallows   - doesn't bother her unless she thinks about it   - mostly notices when she is dehydrated  - if she drinks water this goes away      PAST MEDICAL HISTORY:   Past Medical History:   Diagnosis Date    Colon polyps     Diffuse cystic mastopathy     Fibrocystic breasts, asymmetric breast tissue    Follicular carcinoma of thyroid (H) 2023    History of radiation exposure     [place] 12 [facility] HI-RISK PREG-YOUNG MULTIP 2006    Thyroid nodule     Tobacco use disorder     quit        PAST SURGICAL HISTORY:   Past Surgical History:   Procedure Laterality Date    BIOPSY  2022    THYROIDECTOMY Left 2023     Procedure: Left thyroid lobectomy and isthmusectomy;  Surgeon: Charley Humphreys MD;  Location: [facility] OR    TONSILLECTOMY  1980    unilateral (right) tonsillectomy       CURRENT MEDICATIONS:   Current Outpatient Medications:     levothyroxine (SYNTHROID/LEVOTHROID) 100 MCG tablet, 50 mcg/day for one week, then 100 mcg/day thereafter, Disp: 90 tablet, Rfl: 4    ALLERGIES: Patient has no known allergies.    SOCIAL HISTORY:    Social History     Socioeconomic History    Marital status:      Spouse name: Bishnu    Number of children: 1    Years of education: 16    Highest education level: Not on file   Occupational History    Occupation: Dental      Comment: [employer]   Tobacco Use    Smoking status: Former     Packs/day: 0.50     Years: 10.00     Pack years: 5.00     Types: Cigarettes     Quit date: 2004     Years since quittin.8    Smokeless tobacco: Never    Tobacco comments:     total smoking about 5 pack years   Vaping Use    Vaping status: Never Used   Substance and Sexual Activity    Alcohol use: Yes     Comment: less than monthly    Drug use: No    Sexual activity: Yes     Partners: Male     Birth control/protection: Condom   Other Topics Concern     Service No    Blood Transfusions No    Caffeine Concern Not Asked    Occupational Exposure Not Asked    Hobby Hazards Not Asked    Sleep Concern Not Asked    Stress Concern Not Asked    Weight Concern Not Asked    Special Diet Not Asked    Back Care Not Asked    Exercise Yes     Comment: about twice weekly    Bike Helmet Not Asked    Seat Belt Yes    Self-Exams Yes   Social History Narrative    Not on file     Social Determinants of Health     Financial Resource Strain: Low Risk  (2022)    Overall Financial Resource Strain (CARDIA)     Difficulty of Paying Living Expenses: Not hard at all   Food Insecurity: No Food Insecurity (2022)    Hunger Vital Sign     Worried About Running Out of Food in the Last Year:  Never true     Ran Out of Food in the Last Year: Never true   Transportation Needs: No Transportation Needs (5/23/2022)    PRAPARE - Transportation     Lack of Transportation (Medical): No     Lack of Transportation (Non-Medical): No   Physical Activity: Insufficiently Active (5/23/2022)    Exercise Vital Sign     Days of Exercise per Week: 1 day     Minutes of Exercise per Session: 30 min   Stress: No Stress Concern Present (5/23/2022)    [place] [employer] - Occupational Stress Questionnaire     Feeling of Stress : Not at all   Social Connections: Moderately Integrated (5/23/2022)    Social Connection and Isolation Panel [NHANES]     Frequency of Communication with Friends and Family: More than three times a week     Frequency of Social Gatherings with Friends and Family: Once a week     Attends Zoroastrianism Services: Never     Active Member of Clubs or Organizations: Yes     Attends Club or Organization Meetings: Not on file     Marital Status:    Intimate Partner Violence: Not on file   Housing Stability: Unknown (5/23/2022)    Housing Stability Vital Sign     Unable to Pay for Housing in the Last Year: No     Number of Places Lived in the Last Year: Not on file     Unstable Housing in the Last Year: No         FAMILY HISTORY:   Family History   Problem Relation Age of Onset    Heart Disease Mother         pacemaker    Thyroid nodules Mother     Thyroid Disease Mother     Heart Disease Father     Diabetes Father     C.A.D. Father         MI age 62    Alzheimer Disease Father     Hypertension Father     Hyperlipidemia Father     Lipids Brother     Breast Cancer Maternal Grandmother         and Ailyn had post-menopausal cancer    Neurologic Disorder Paternal Grandmother         Alzheimer's    Lymphoma Cousin     Cancer - colorectal No family hx of     Thyroid Cancer No family hx of       Non-contributory for problems with anesthesia    REVIEW OF SYSTEMS:   The patient was asked a 14 point  review of systems regarding constitutional symptoms, eye symptoms, ears, nose, mouth, throat symptoms, cardiovascular symptoms, respiratory symptoms, gastrointestinal symptoms, genitourinary symptoms, musculoskeletal symptoms, integumentary symptoms, neurological symptoms, psychiatric symptoms, endocrine symptoms, hematologic/lymphatic symptoms, and allergic/ immunologic symptoms.   The pertinent factors have been included in the HPI and below.  Patient Supplied Answers to Review of Systems      4/14/2023    12:48 PM   [facility]NT ROS   Constitutional Weight gain       Physical Examination   The patient underwent a physical examination as described below. The pertinent positive and negative findings are summarized after the description of the examination.  Constitutional: The patient's developmental and nutritional status was assessed. The patient's voice quality was assessed.  Head and Face: The head and face were inspected for deformities. The sinuses were palpated. The salivary glands were palpated. Facial muscle strength was assessed bilaterally.  Eyes: Extraocular movements and primary gaze alignment were assessed.  Ears, Nose, Mouth and Throat: The ears and nose were examined for deformities. The nasal septum, mucosa, and turbinates were inspected by anterior rhinoscopy. The lips, teeth, and gums were examined for abnormalities. The oral mucosa, tongue, palate, tonsils, lateral and posterior pharynx were inspected for the presence of asymmetry or mucosal lesions.    Neck: The tracheal position was noted, and the neck mass palpated to determine if there were any asymmetries, abnormal neck masses, thyromegally, or thyroid nodules.  Respiratory: The nature of the breathing and chest expansion/symmetry was observed.  Cardiovascular: The patient was examined to determine the presence of any edema or jugular venous distension.  Abdomen: The contour of the abdomen was noted.  Lymphatic: The patient was examined for  infraclavicular lymphadenopathy.  Musculoskeletal: The patient was inspected for the presence of skeletal deformities.  Extremities: The extremities were examined for any clubbing or cyanosis.  Skin: The skin was examined for inflammatory or neoplastic conditions.  Neurologic: The patient's orientation, mood, and affect were noted. The cranial nerve  functions were examined.  Other pertinent positive and negative findings on physical examination:   OC/OP: no lesions, uvula midline, soft palate elevates symmetrically   Neck: no lesions, no TH tenderness to palpation, Midlinemneck incision well healed    All other physical examination findings were within normal limits and noncontributory.    Procedures   Flexible laryngoscopy (CPT 77779)        Pre-procedure diagnosis: dysphonia  Post-procedure diagnosis: same as above  Indication for procedure: Ms. Norton is a 47 year old female with see above  Procedure(s): Fiberoptic Laryngoscopy     Details of Procedure: After informed consent was obtained, the patient was seated in the examination chair.  The areas of the nasopharynx as well as the hypopharynx were anesthetized with topical 4% lidocaine with 0.25% phenylephrine atomizer.  Examination of the base of tongue was performed first.  Attention was directed to any evidence of masses in the area or evidence of leukoplakia or candidal infection.  Attention was directed to the epiglottis where its size and position was determined and its movement on phonation of the vowel  e .  The piriform sinuses were then inspected for any mass lesions or pooling of secretions.  Attention was then directed to the larynx. The vocal folds were inspected for infection or any areas of leukoplakia, for masses, polypoid degeneration, or hemorrhage.  Having done this, the arytenoids and vocal processes were inspected for erythema or evidence of granuloma formation.  The posterior commissure was then inspected for evidence of inflammatory  changes in the mucosa and heaping up of mucosal tissue. The patient was then instructed to say the vowel  e .  Adduction of vocal folds to the midline was observed for any evidence of paresis or paralysis of the larynx or asymmetry in rotation of the larynx to the left or right. The patient was asked to breathe and the degree of abduction was noted bilaterally.  Subglottic view of the larynx was obtained for any additional mass lesions or mucosal changes.  Finally the post cricoid was examined for evidence of pooling of secretions, as well as the pharyngeal wall mucosa.   Anesthesia type: 0.25% phenylephrine     Findings:  Anatomic/physiological deviations: RNC, left vocal fold with full motion now, good closer, right vocal fold with hypervascularity, stable epiglottic cyst                Right vocal process: No restriction of mobility   Left vocal process: No restriction of mobility  Glottal gap: Complete glottal closure   Supraglottic structures: Normal  Hypopharynx: Normal      Estimated Blood Loss: minimal  Complications: None  Disposition: Patient tolerated the procedure well                Fiberoptic Endoscopic Evaluation of Swallowing (CPT 49667)  and Interpretation of Swallowing (CPT 07440)     Indications: See above notes for complete history and physical. Patient complains of dysphagia to both solids and liquids and/or there is suggestion on history and endoscopic exam of the presence of dysphagia causing medical complaints.  Swallowing evaluation is being performed to assess the presence and degree of dysphagia, and to recommend a safe diet.     Pulmonary Status:        No PNA   Current Diet:                regular                                                    thin liquids      Consistency Amounts:  Thin Liquid: sip   Puree: none   Solid: none             Positioning: upright in a chair  Oral Peripheral Exam: See physical exam section.  Anatomic Notes: See Videostroboscopy report for assessment of  "anatomy and laryngeal functioning  Pharyngeal secretions prior to administration of liquid or food: No  Oral Phase Abnormal Findings: No abnormal behavior observed  Behavioral Adaptations: No abnormal behavior observed  Pharyngeal Phase Abnormal Findings: no penetration, no aspiration only did liquid       Recommended Diet:  regular                                        thin liquids     Review of Relevant Clinical Data   I personally reviewed:  Radiology:   CT chest 3/17/23  IMPRESSION:   1.  An indeterminate 3 mm right lower lobe nodule. Consider follow-up chest CT in 3 months to ensure stability.    Labs:  Lab Results   Component Value Date    TSH 3.80 02/25/2023     Lab Results   Component Value Date     12/27/2022    CO2 25 12/27/2022    BUN 17.9 12/27/2022     Lab Results   Component Value Date    WBC 7.7 12/27/2022    HGB 14.2 12/27/2022    HCT 41.8 12/27/2022    MCV 84 12/27/2022     12/27/2022     No results found for: PT, PTT, INR  No results found for: BEAU  No components found for: RHEUMATOIDFACTOR,  RF  No results found for: CRP  No components found for: CKTOT, URICACID  No components found for: C3, C4, DSDNAAB, NDNAABIFA  No results found for: MPOAB    Patient reported Quality of Life (QOL) Measures   Patient Supplied Answers To VHI Questionnaire      2/17/2023     4:37 PM   Voice Handicap Index (VHI-10)   My voice makes it difficult for people to hear me 3   People have difficulty understanding me in a noisy room 3   My voice difficulties restrict my personal and social life.  2   I feel left out of conversations because of my voice 1   My voice problem causes me to lose income 1   I feel as though I have to strain to produce voice 1   The clarity of my voice is unpredictable 2   My voice problem upsets me 1   My voice makes me feel handicapped 1   People ask, \"What's wrong with your voice?\" 3   VHI-10 18         Patient Supplied Answers To EAT Questionnaire      2/17/2023     4:38 PM "   Eating Assessment Tool (EAT-10)   My swallowing problem has caused me to lose weight 0   My swallowing problem interferes with my ability to go out for meals 0   Swallowing liquids takes extra effort 0   Swallowing solids takes extra effort 0   Swallowing pills takes extra effort 0   Swallowing is painful 0   The pleasure of eating is affected by my swallowing 0   When I swallow food sticks in my throat 0   I cough when I eat 0   Swallowing is stressful 0   EAT-10 0         Patient Supplied Answers To CSI Questionnaire      2023     4:38 PM   Cough Severity Index (CSI)   My cough is worse when I lie down 0   My coughing problem causes me to restrict my personal and social life 0   I tend to avoid places because of my cough problem 0   I feel embarrassed because of my coughing problem 0   People ask, ''What's wrong?'' because I cough a lot 0   I run out of air when I cough 0   My coughing problem affects my voice 0   My coughing problem limits my physical activity 0   My coughing problem upsets me 0   People ask me if I am sick because I cough a lot 0   CSI Score 0          Impression & Plan     IMPRESSION: Ms. Norton is a 47 year old female who is being seen for the followin. Dysphonia  - in the setting of left thyroid lobectomy and isthmusectomy 23  - for a month after surgery, unable to talk  - her voice returned on 23   - since then, has been improving consistently  - almost fully back to normal  - scope shows left vocal fold paresis, bowing, still some glottic gap  - discussed etiology of vocal fold paralysis in this case is due to surgery  - discussed prognosis of vocal fold paralysis is good since she already has some movement in her vocal fold    - discussed options of treatment which include observation vs voice therapy  - given she does not have pain at this time - will proceed with observation  - given she still has a glottic gap would continue voice use limitation at work   - will  provide a letter of accommodation to limit voice use at work, especially phone conversation  - symptoms 4/20/23 are improved and she is able to use her phone   - scope shows left vocal fold with full motion now, good closer, right vocal fold with hypervascularity, stable epiglottic cyst   - has full motion of her vocal fold now, discussed if she proceeds with another surgery again the risk is of unilateral paralysis rather than bilateral paralysis since her left vocal fold is now mobile   - has now sensation of something stuck in the throat, gets better with liquid drinks, thinks this is the cyst, discussed this is likely muscle tension, recommend observation of this and recheck of the cyst in 6 months   Plan  - observation         2. Dysphagia  - initially after surgery, had difficulty with water  - now swallow is better  - FEES shows penetration with thins, did cough  - symptoms likely due to glottic insufficiency, discussed swallow precautions   - symptoms 4/20/23 are stable  - FEES only did with thins, no penetration or aspiration    Plan  - resolved      RETURN VISIT: 6 months     Scribe Disclosure:  I, Roro Diamond am serving as a scribe to document services personally performed by Adali Schafer MD at this visit, based upon the provider's statements to me. All documentation has been reviewed by the aforementioned provider prior to being entered into the official medical record.     Adali Schafer MD    Laryngology    [facility]  Department of  Otolaryngology - Head and Neck Surgery  Clinics & Surgery Center  [address] [place] [address]  Appointment line: [phone]  Fax: [phone]  [email or web address]          Again, thank you for allowing me to participate in the care of your patient.      Sincerely,    Adali Schafer MD

## 2023-04-23 ENCOUNTER — PATIENT OUTREACH (OUTPATIENT)
Dept: CARE COORDINATION | Facility: CLINIC | Age: 48
End: 2023-04-23
Payer: COMMERCIAL

## 2023-05-13 ENCOUNTER — LAB (OUTPATIENT)
Dept: LAB | Facility: CLINIC | Age: 48
End: 2023-05-13
Payer: COMMERCIAL

## 2023-05-13 DIAGNOSIS — C73 FOLLICULAR CARCINOMA OF THYROID (H): ICD-10-CM

## 2023-05-13 DIAGNOSIS — E89.0 HISTORY OF PARTIAL THYROIDECTOMY: ICD-10-CM

## 2023-05-13 LAB
T4 FREE SERPL-MCNC: 1.85 NG/DL (ref 0.9–1.7)
TSH SERPL DL<=0.005 MIU/L-ACNC: 0.47 UIU/ML (ref 0.3–4.2)

## 2023-05-13 PROCEDURE — 36415 COLL VENOUS BLD VENIPUNCTURE: CPT

## 2023-05-13 PROCEDURE — 84439 ASSAY OF FREE THYROXINE: CPT

## 2023-05-13 PROCEDURE — 84443 ASSAY THYROID STIM HORMONE: CPT

## 2023-05-15 NOTE — TELEPHONE ENCOUNTER
REFERRAL INFORMATION:    Referring Provider:  Dr. Andreina Clarke    Referring Clinic:  [facility]    Reason for Visit/Diagnosis: NEW MWM       FUTURE VISIT INFORMATION:    Appointment Date: 05-26-23    Appointment Time: @ 1:30pm     NOTES RECORD STATUS  DETAILS   OFFICE NOTE from Referring Provider Internal 02-21-23, 02-20-23 Dr. Andreina Clarke   OFFICE NOTE from Other Specialists N/A    HOSPITAL DISCHARGE SUMMARY/ ED VISITS  N/A    OPERATIVE REPORT N/A    ENDOSCOPY (EGD)  N/A    PERTINENT LABS Care everywhere C-diff: 05-13-18   PATHOLOGY REPORTS (RELATED) N/A    IMAGING (CT, MRI, US, XR)  N/A

## 2023-05-16 ENCOUNTER — VIRTUAL VISIT (OUTPATIENT)
Dept: ENDOCRINOLOGY | Facility: CLINIC | Age: 48
End: 2023-05-16
Attending: SURGERY
Payer: COMMERCIAL

## 2023-05-16 DIAGNOSIS — E89.0 HISTORY OF PARTIAL THYROIDECTOMY: ICD-10-CM

## 2023-05-16 DIAGNOSIS — Z86.39 HISTORY OF THYROID NODULE: ICD-10-CM

## 2023-05-16 DIAGNOSIS — C73: Primary | ICD-10-CM

## 2023-05-16 PROCEDURE — 99417 PROLNG OP E/M EACH 15 MIN: CPT | Mod: VID | Performed by: INTERNAL MEDICINE

## 2023-05-16 PROCEDURE — 99215 OFFICE O/P EST HI 40 MIN: CPT | Mod: VID | Performed by: INTERNAL MEDICINE

## 2023-05-16 NOTE — PROGRESS NOTES
Virtual Visit Details    Type of service:  Video Visit     Originating Location (pt. Location):  Work    Distant Location (provider location):  Off-site  Platform used for Video Visit: Diego        Name: Saloni Norton is a 47 year old woman, seen at the request of Dr. Charley Humphreys for evaluation of     Chief Complaint   Patient presents with     Thyroid Problem       HPI:  Recent issues:  Here for evaluation of thyroid cancer  Patient has seen Carol Clarke and KEO Humphreys, interested in a 2nd opinion on thyroid cancer management   Reviewed medical history from patient and Epic chart record        Native of [facility]  Exposure to radiation from [place] nuclear power accident, age 12  Previous thyroid U/S imaging (in country of [place]), diagnosis of 2 thyroid nodules    Medical evaluation with Dr. Aminah Navarro/[facility]  Additional thyroid workup initiated  11/23/22 U/S guided FNA biopsies:   3.6 cm isthmus nodule cytology: AUS, subsequent Afirma GSC suspicious   1.8 cm left thyroid nodule: suspicious for papillary thyroid carcinoma, Afirma Xpression atlas [id number] mutation    12/5/22. Medical evaluation with Dr. Andreina Clarke/[facility] Endocrinology  Detailed review of medical and pathology records, recommendation for thyroid surgery  Thyroid surgery referral with Dr. 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, also benign nodular hyperplasia            SPECIMEN   Procedure   Left lobectomy with isthmusectomy (hemithyroidectomy)    TUMOR   Tumor Focality   Unifocal    Tumor Characteristics       Tumor Site   Isthmus    Tumor Size   Greatest Dimension (Centimeters): 4.0 cm   Histologic Type   Follicular carcinoma, minimally invasive    Tumor Necrosis   Not identified    Angioinvasion (vascular invasion)   Cannot be determined: suspicious    Lymphatic  "Invasion   Not identified    Perineural Invasion   Not identified    Extrathyroidal Extension   Not identified    Margin Status   Carcinoma present at margin    Margin(s) Involved by Carcinoma   anterior    REGIONAL LYMPH NODES   Regional Lymph Node Status   All regional lymph nodes negative for tumor    Number of Lymph Nodes Examined   1    Ena Level(s) Examined   Level VI      Postop complications- dysphonia, with left VC paresis  Has seen Dr. Adali Schafer /[facility] ENT and appointment with speech therapist    3/17/23 CT Chest w/o contrast:   Indeterminant 3 mm right lower lobe nodule.     Management plan outlined by Dr. Clarke   Began levothyroxine treatment with goal for TSH suppression   ENT/Speech Therapy for dysphonia and repeat CT Chest w/o contrast in 6/2023    Previous [facility] thyroid tests include:     Lab Test 05/13/23  1105 02/25/23  1303 02/12/23  1111 06/04/22  1016   TSH 0.47 3.80 2.40 1.10   T4 1.85* 1.17  --   --      Recent thyroglobulin level tests:  2/25/23  Tg 15.60 ng/mL, TgAb neg    Recent [facility] labs include:  Lab Results   Component Value Date    TSH 0.47 05/13/2023    T4 1.85 (H) 05/13/2023     Current dose:  Levothyroxine 0.1 mg daily    Patient concerns about weight gain, reports gain of 20-25#/2-3 years   Considering appt at [facility] Loss clinic  She reports her voice is now \"back to normal\" and this was confirmed by ENT eval (?)        Lives in [place], [place], 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 Marielle Mancilla/[facility] for general medicine evaluations.    PMH/PSH:  Past Medical History:   Diagnosis Date     Colon polyps      Diffuse cystic mastopathy     Fibrocystic breasts, asymmetric breast tissue     Follicular carcinoma of thyroid (H) 2/21/2023     History of radiation exposure     [place] 12 [facility]     SUPR HI-RISK PREG-YOUNG MULTIP 07/28/2006     Thyroid nodule      Tobacco use disorder     quit 2004     Past Surgical History:   Procedure Laterality Date "     BIOPSY  2022     THYROIDECTOMY Left 2023    Procedure: Left thyroid lobectomy and isthmusectomy;  Surgeon: Charley Humphreys MD;  Location: [facility] OR     TONSILLECTOMY  1980    unilateral (right) tonsillectomy       Family Hx:  Family History   Problem Relation Age of Onset     Heart Disease Mother         pacemaker     Thyroid nodules Mother      Thyroid Disease Mother      Heart Disease Father      Diabetes Father      C.A.D. Father         MI age 62     Alzheimer Disease Father      Hypertension Father      Hyperlipidemia Father      Lipids Brother      Breast Cancer Maternal Grandmother         and Ailyn had post-menopausal cancer     Neurologic Disorder Paternal Grandmother         Alzheimer's     Lymphoma Cousin      Cancer - colorectal No family hx of      Thyroid Cancer No family hx of          Social Hx:  Social History     Socioeconomic History     Marital status:      Spouse name: Bishnu     Number of children: 1     Years of education: 16     Highest education level: Not on file   Occupational History     Occupation: Dental      Comment: [employer]   Tobacco Use     Smoking status: Former     Packs/day: 0.50     Years: 10.00     Pack years: 5.00     Types: Cigarettes     Quit date: 2004     Years since quittin.9     Smokeless tobacco: Never     Tobacco comments:     total smoking about 5 pack years   Vaping Use     Vaping status: Never Used   Substance and Sexual Activity     Alcohol use: Yes     Comment: less than monthly     Drug use: No     Sexual activity: Yes     Partners: Male     Birth control/protection: Condom   Other Topics Concern      Service No     Blood Transfusions No     Caffeine Concern Not Asked     Occupational Exposure Not Asked     Hobby Hazards Not Asked     Sleep Concern Not Asked     Stress Concern Not Asked     Weight Concern Not Asked     Special Diet Not Asked     Back Care Not Asked     Exercise Yes     Comment: about  twice weekly     Bike Helmet Not Asked     Seat Belt Yes     Self-Exams Yes   Social History Narrative     Not on file     Social Determinants of Health     Financial Resource Strain: Low Risk  (5/23/2022)    Overall Financial Resource Strain (CARDIA)      Difficulty of Paying Living Expenses: Not hard at all   Food Insecurity: No Food Insecurity (5/23/2022)    Hunger Vital Sign      Worried About Running Out of Food in the Last Year: Never true      Ran Out of Food in the Last Year: Never true   Transportation Needs: No Transportation Needs (5/23/2022)    PRAPARE - Transportation      Lack of Transportation (Medical): No      Lack of Transportation (Non-Medical): No   Physical Activity: Insufficiently Active (5/23/2022)    Exercise Vital Sign      Days of Exercise per Week: 1 day      Minutes of Exercise per Session: 30 min   Stress: No Stress Concern Present (5/23/2022)    [place] [place] [employer] - Occupational Stress Questionnaire      Feeling of Stress : Not at all   Social Connections: Moderately Integrated (5/23/2022)    Social Connection and Isolation Panel [NHANES]      Frequency of Communication with Friends and Family: More than three times a week      Frequency of Social Gatherings with Friends and Family: Once a week      Attends Sabianism Services: Never      Active Member of Clubs or Organizations: Yes      Attends Club or Organization Meetings: Not on file      Marital Status:    Intimate Partner Violence: Not on file   Housing Stability: Unknown (5/23/2022)    Housing Stability Vital Sign      Unable to Pay for Housing in the Last Year: No      Number of Places Lived in the Last Year: Not on file      Unstable Housing in the Last Year: No          MEDICATIONS:  has a current medication list which includes the following prescription(s): levothyroxine.    ROS:     ROS: 10 point ROS neg other than the symptoms noted above in the HPI.    GENERAL: some fatigue, gradual wt gain;  denies fevers, chills, night sweats.   HEENT: no recent voice changes; denies dysphagia, odonophagia, diplopia, neck pain  THYROID:  no apparent hyper or hypothyroid symptoms  CV: no chest pain, pressure, palpitations  LUNGS: no SOB, NGO, cough, wheezing   ABDOMEN: no diarrhea, constipation, abdominal pain  EXTREMITIES: no rashes, ulcers, edema  NEUROLOGY: no headaches, denies changes in vision, tingling, extremitiy numbness   MSK: no muscle aches or pains, weakness  SKIN: no rashes or lesions  : menses info not available today  PSYCH:  stable mood, no significant anxiety or depression  ENDOCRINE: no heat or cold intolerance    Physical Exam (visual exam)  VS:  no vital signs taken for video visit  CONSTITUTIONAL: healthy, alert and NAD, well dressed, answering questions appropriately  ENT: no nose swelling or nasal discharge, mouth redness or gum changes.  EYES: eyes grossly normal to inspection, conjunctivae and sclerae normal, no exophthalmos or proptosis  THYROID:  no apparent nodules or goiter  LUNGS: no audible wheeze, cough or visible cyanosis, no visible retractions or increased work of breathing  ABDOMEN: abdomen not evaluated  EXTREMITIES: no hand tremors, limited exam  NEUROLOGY: CN grossly intact, mentation intact and speech normal   SKIN:  no apparent skin lesions, rash, or edema with visualized skin appearance  PSYCH: mentation appears normal, affect normal/bright, judgement and insight intact,   normal speech and appearance well groomed      LABS:    All pertinent notes, labs, and images personally reviewed by me.     A/P:  Encounter Diagnoses   Name Primary?     Minimally invasive follicular carcinoma (H) Yes     History of thyroid nodules      History of partial thyroidectomy        Comments:  Reviewed complicated health history and thyroid cancer issues   I agree with treatment plan per Dr. Clarke  Patient reports her voice and vocal cord dysfunction has resolved, so completion thyroidectomy  a good option  Would not advise radioactive iodine ablation with a residual (remaining) thyroid lobe.    Plan:  Discussed general issues with follicular thyroid cancer (FTC) diagnosis and management  Reviewed minimally-invasive FTC vs traditional FTC thyroid cancer  We reviewed highlights of the patient's thyroid surgery pathology report  Discussed lab tests used to assess patient thyroid hormone levels, also use of the thyroglobulin testing    Recommend:  Continue current levothyroxine 0.1 mg daily dose  Plan repeat thyroid levels ~6 weeks after achieving this 0.1 mg daily dose   Check TSH, Free T4 EqDial, Tg, calcium    Agree with lab testing plan per Dr. Clarke  Recommend follow-up general surgery appointment and completion thyroidectomy surgery   Postop, review surgical path report and consider observation plan vs remnant radioiodine ablation treatment ~75 mCi 131-I   If no evidence of thyroid cancer, goal target TSH near 0.4 mU/L  Monitor for symptom changes  Periodic lab testing for thyroid, calcium, vit D levels  Keep the scheduled followup appt with Dr. Clarke 8/22/23    Addressed patient questions today     There are no Patient Instructions on file for this visit.    Future labs ordered today: No orders of the defined types were placed in this encounter.    Radiology/Consults ordered today:     Total time spent on day of encounter:  70 min    Follow-up:  Not planned    ALEXIS Perez MD, MS  Endocrinology  [facility]    CC: Marielle Mancilla Burmeister, Lynn, and Charley Humphreys

## 2023-05-16 NOTE — LETTER
5/16/2023         RE: Saloni Norton  [id number] [address] [facility] [id number]        Dear Colleague,    Thank you for referring your patient, Saloni Norton, to the [facility] [place]. Please see a copy of my visit note below.    Virtual Visit Details    Type of service:  Video Visit     Originating Location (pt. Location):  Work    Distant Location (provider location):  Off-site  Platform used for Video Visit: [facility]        Name: Saloni Norton is a 47 year old woman, seen at the request of Dr. Charley Humphreys for evaluation of     Chief Complaint   Patient presents with     Thyroid Problem       HPI:  Recent issues:  Here for evaluation of thyroid cancer  Patient has seen Carol Clarke and KEO Humphreys, interested in a 2nd opinion on thyroid cancer management   Reviewed medical history from patient and Epic chart record        Native of [place]  Exposure to radiation from [place] nuclear power accident, age 12  Previous thyroid U/S imaging (in country of [place]), diagnosis of 2 thyroid nodules    Medical evaluation with Dr. Aminah Navarro/[facility]  Additional thyroid workup initiated  11/23/22 U/S guided FNA biopsies:   3.6 cm isthmus nodule cytology: AUS, subsequent Afirma GSC suspicious   1.8 cm left thyroid nodule: suspicious for papillary thyroid carcinoma, Afirma Xpression atlas [id number] mutation    12/5/22. Medical evaluation with Dr. Andreina Clarke/[facility] Endocrinology  Detailed review of medical and pathology records, recommendation for thyroid surgery  Thyroid surgery referral with Dr. Charley Humphreys/[facility] Gen Surg, [facility]    1/4/2023 Left thyroid lobectomy and isthmusectomy   Path showed dominant nodule had minimally-invasive follicular thyroid carcinoma, also benign nodular hyperplasia            SPECIMEN   Procedure   Left lobectomy with isthmusectomy (hemithyroidectomy)    TUMOR   Tumor Focality   Unifocal    Tumor Characteristics      "  Tumor Site   Isthmus    Tumor Size   Greatest Dimension (Centimeters): 4.0 cm   Histologic Type   Follicular carcinoma, minimally invasive    Tumor Necrosis   Not identified    Angioinvasion (vascular invasion)   Cannot be determined: suspicious    Lymphatic Invasion   Not identified    Perineural Invasion   Not identified    Extrathyroidal Extension   Not identified    Margin Status   Carcinoma present at margin    Margin(s) Involved by Carcinoma   anterior    REGIONAL LYMPH NODES   Regional Lymph Node Status   All regional lymph nodes negative for tumor    Number of Lymph Nodes Examined   1    Ena Level(s) Examined   Level VI      Postop complications- dysphonia, with left VC paresis  Has seen Dr. Adali Schafer /[facility] ENT and appointment with speech therapist    3/17/23 CT Chest w/o contrast:   Indeterminant 3 mm right lower lobe nodule.     Management plan outlined by Dr. Clarke   Began levothyroxine treatment with goal for TSH suppression   ENT/Speech Therapy for dysphonia and repeat CT Chest w/o contrast in 6/2023    Previous [facility] thyroid tests include:     Lab Test 05/13/23  1105 02/25/23  1303 02/12/23  1111 06/04/22  1016   TSH 0.47 3.80 2.40 1.10   T4 1.85* 1.17  --   --      Recent thyroglobulin level tests:  2/25/23  Tg 15.60 ng/mL, TgAb neg    Recent [facility] labs include:  Lab Results   Component Value Date    TSH 0.47 05/13/2023    T4 1.85 (H) 05/13/2023     Current dose:  Levothyroxine 0.1 mg daily    Patient concerns about weight gain, reports gain of 20-25#/2-3 years   Considering appt at 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 Marielle Mancilla/[facility] for general medicine evaluations.    PMH/PSH:  Past Medical History:   Diagnosis Date     Colon polyps      Diffuse cystic mastopathy     Fibrocystic breasts, asymmetric breast tissue     Follicular " carcinoma of thyroid (H) 2023     History of radiation exposure     [place] 12 [facility]     SUPRV HI-RISK PREG-YOUNG MULTIP 2006     Thyroid nodule      Tobacco use disorder     quit      Past Surgical History:   Procedure Laterality Date     BIOPSY  2022     THYROIDECTOMY Left 2023    Procedure: Left thyroid lobectomy and isthmusectomy;  Surgeon: Charley Humphreys MD;  Location: [facility] OR     TONSILLECTOMY  1980    unilateral (right) tonsillectomy       Family Hx:  Family History   Problem Relation Age of Onset     Heart Disease Mother         pacemaker     Thyroid nodules Mother      Thyroid Disease Mother      Heart Disease Father      Diabetes Father      C.A.D. Father         MI age 62     Alzheimer Disease Father      Hypertension Father      Hyperlipidemia Father      Lipids Brother      Breast Cancer Maternal Grandmother         and Ailyn had post-menopausal cancer     Neurologic Disorder Paternal Grandmother         Alzheimer's     Lymphoma Cousin      Cancer - colorectal No family hx of      Thyroid Cancer No family hx of          Social Hx:  Social History     Socioeconomic History     Marital status:      Spouse name: Bishnu     Number of children: 1     Years of education: 16     Highest education level: Not on file   Occupational History     Occupation: Dental      Comment: [employer]   Tobacco Use     Smoking status: Former     Packs/day: 0.50     Years: 10.00     Pack years: 5.00     Types: Cigarettes     Quit date: 2004     Years since quittin.9     Smokeless tobacco: Never     Tobacco comments:     total smoking about 5 pack years   Vaping Use     Vaping status: Never Used   Substance and Sexual Activity     Alcohol use: Yes     Comment: less than monthly     Drug use: No     Sexual activity: Yes     Partners: Male     Birth control/protection: Condom   Other Topics Concern      Service No     Blood Transfusions No     Caffeine  Concern Not Asked     Occupational Exposure Not Asked     Hobby Hazards Not Asked     Sleep Concern Not Asked     Stress Concern Not Asked     Weight Concern Not Asked     Special Diet Not Asked     Back Care Not Asked     Exercise Yes     Comment: about twice weekly     Bike Helmet Not Asked     Seat Belt Yes     Self-Exams Yes   Social History Narrative     Not on file     Social Determinants of Health     Financial Resource Strain: Low Risk  (5/23/2022)    Overall Financial Resource Strain (CARDIA)      Difficulty of Paying Living Expenses: Not hard at all   Food Insecurity: No Food Insecurity (5/23/2022)    Hunger Vital Sign      Worried About Running Out of Food in the Last Year: Never true      Ran Out of Food in the Last Year: Never true   Transportation Needs: No Transportation Needs (5/23/2022)    PRAPARE - Transportation      Lack of Transportation (Medical): No      Lack of Transportation (Non-Medical): No   Physical Activity: Insufficiently Active (5/23/2022)    Exercise Vital Sign      Days of Exercise per Week: 1 day      Minutes of Exercise per Session: 30 min   Stress: No Stress Concern Present (5/23/2022)    [place] [employer] - Occupational Stress Questionnaire      Feeling of Stress : Not at all   Social Connections: Moderately Integrated (5/23/2022)    Social Connection and Isolation Panel [NHANES]      Frequency of Communication with Friends and Family: More than three times a week      Frequency of Social Gatherings with Friends and Family: Once a week      Attends Confucianist Services: Never      Active Member of Clubs or Organizations: Yes      Attends Club or Organization Meetings: Not on file      Marital Status:    Intimate Partner Violence: Not on file   Housing Stability: Unknown (5/23/2022)    Housing Stability Vital Sign      Unable to Pay for Housing in the Last Year: No      Number of Places Lived in the Last Year: Not on file      Unstable Housing in the  Last Year: No          MEDICATIONS:  has a current medication list which includes the following prescription(s): levothyroxine.    ROS:     ROS: 10 point ROS neg other than the symptoms noted above in the HPI.    GENERAL: some fatigue, gradual wt gain; denies fevers, chills, night sweats.   HEENT: no recent voice changes; denies dysphagia, odonophagia, diplopia, neck pain  THYROID:  no apparent hyper or hypothyroid symptoms  CV: no chest pain, pressure, palpitations  LUNGS: no SOB, NGO, cough, wheezing   ABDOMEN: no diarrhea, constipation, abdominal pain  EXTREMITIES: no rashes, ulcers, edema  NEUROLOGY: no headaches, denies changes in vision, tingling, extremitiy numbness   MSK: no muscle aches or pains, weakness  SKIN: no rashes or lesions  : menses info not available today  PSYCH:  stable mood, no significant anxiety or depression  ENDOCRINE: no heat or cold intolerance    Physical Exam (visual exam)  VS:  no vital signs taken for video visit  CONSTITUTIONAL: healthy, alert and NAD, well dressed, answering questions appropriately  ENT: no nose swelling or nasal discharge, mouth redness or gum changes.  EYES: eyes grossly normal to inspection, conjunctivae and sclerae normal, no exophthalmos or proptosis  THYROID:  no apparent nodules or goiter  LUNGS: no audible wheeze, cough or visible cyanosis, no visible retractions or increased work of breathing  ABDOMEN: abdomen not evaluated  EXTREMITIES: no hand tremors, limited exam  NEUROLOGY: CN grossly intact, mentation intact and speech normal   SKIN:  no apparent skin lesions, rash, or edema with visualized skin appearance  PSYCH: mentation appears normal, affect normal/bright, judgement and insight intact,   normal speech and appearance well groomed      LABS:    All pertinent notes, labs, and images personally reviewed by me.     A/P:  Encounter Diagnoses   Name Primary?     Minimally invasive follicular carcinoma (H) Yes     History of thyroid nodules       History of partial thyroidectomy        Comments:  Reviewed complicated health history and thyroid cancer issues   I agree with treatment plan per Dr. Clarke  Patient reports her voice and vocal cord dysfunction has resolved, so completion thyroidectomy a good option  Would not advise radioactive iodine ablation with a residual (remaining) thyroid lobe.    Plan:  Discussed general issues with follicular thyroid cancer (FTC) diagnosis and management  Reviewed minimally-invasive FTC vs traditional FTC thyroid cancer  We reviewed highlights of the patient's thyroid surgery pathology report  Discussed lab tests used to assess patient thyroid hormone levels, also use of the thyroglobulin testing    Recommend:  Continue current levothyroxine 0.1 mg daily dose  Plan repeat thyroid levels ~6 weeks after achieving this 0.1 mg daily dose   Check TSH, Free T4 EqDial, Tg, calcium    Agree with lab testing plan per Dr. Clarke  Recommend follow-up general surgery appointment and completion thyroidectomy surgery   Postop, review surgical path report and consider observation plan vs remnant radioiodine ablation treatment ~75 mCi 131-I   If no evidence of thyroid cancer, goal target TSH near 0.4 mU/L  Monitor for symptom changes  Periodic lab testing for thyroid, calcium, vit D levels  Keep the scheduled followup appt with Dr. Clarke 8/22/23    Addressed patient questions today     There are no Patient Instructions on file for this visit.    Future labs ordered today: No orders of the defined types were placed in this encounter.    Radiology/Consults ordered today:     Total time spent on day of encounter:  70 min    Follow-up:  Not planned    ALEXIS Perez MD, MS  Endocrinology  [facility]    CC: Marielle Mancilla Burmeister, Lynn, and Charley Humphreys      Again, thank you for allowing me to participate in the care of your patient.        Sincerely,        Moe Perez MD

## 2023-05-26 ENCOUNTER — PRE VISIT (OUTPATIENT)
Dept: ENDOCRINOLOGY | Facility: CLINIC | Age: 48
End: 2023-05-26

## 2023-05-26 ENCOUNTER — VIRTUAL VISIT (OUTPATIENT)
Dept: ENDOCRINOLOGY | Facility: CLINIC | Age: 48
End: 2023-05-26
Payer: COMMERCIAL

## 2023-05-26 VITALS — HEIGHT: 64 IN | BODY MASS INDEX: 31.58 KG/M2 | WEIGHT: 185 LBS

## 2023-05-26 DIAGNOSIS — E88.819 INSULIN RESISTANCE: ICD-10-CM

## 2023-05-26 DIAGNOSIS — E66.811 CLASS 1 OBESITY WITH SERIOUS COMORBIDITY AND BODY MASS INDEX (BMI) OF 31.0 TO 31.9 IN ADULT, UNSPECIFIED OBESITY TYPE: Primary | ICD-10-CM

## 2023-05-26 PROCEDURE — 99204 OFFICE O/P NEW MOD 45 MIN: CPT | Mod: VID | Performed by: NURSE PRACTITIONER

## 2023-05-26 RX ORDER — METFORMIN HCL 500 MG
500 TABLET, EXTENDED RELEASE 24 HR ORAL
COMMUNITY
End: 2023-05-26

## 2023-05-26 ASSESSMENT — PAIN SCALES - GENERAL: PAINLEVEL: NO PAIN (0)

## 2023-05-26 NOTE — PROGRESS NOTES
Virtual Visit Details    Type of service:  Video Visit     Originating Location (pt. Location): Home    Distant Location (provider location):  Off-site  Platform used for Video Visit: [place]

## 2023-05-26 NOTE — LETTER
"2023       RE: Saloni Norton  [id number] [address] [facility] [address]     Dear Colleague,    Thank you for referring your patient, Saloni Norton, to the [facility] [place] [facility]. Please see a copy of my visit note below.    45 minutes spent by me on the date of the encounter doing chart review, history and exam, documentation and further activities per the note    New Medical Weight Management Consult    PATIENT:  Saloni Norton  MRN:         [id number]  :         1975  ALEXIS:         2023    Dear Dr Mancilla,    I had the pleasure of seeing your patient, Saloni Norton. Full intake/assessment was done to determine barriers to weight loss success and develop a treatment plan. Saloni Norton is a 47 year old female interested in treatment of medical problems associated with excess weight. She has a height of 5' 4\", a weight of 185 lbs 0 oz, and the calculated Body mass index is 31.76 kg/m .      Assessment & Plan   Problem List Items Addressed This Visit          Digestive    Class 1 obesity with serious comorbidity and body mass index (BMI) of 31.0 to 31.9 in adult, unspecified obesity type - Primary     Has always carried extra weight and had to be mindful of weight. However, more manageable earlier in life. Adult onset obesity, more difficult to manage over time. Frustratingly difficult in the last 3-5 years. Numerous attempts at weight loss with several different plans. Can lose 10-15lb on a structured meal replacement program but not sustainable long term. Now, at high weight in life and feels she has healthy habits but can't seem to lose weight. [place] fasting sugar and fasting insulin and found she had insulin resistance. Started on metformin 500mg without adverse side effects- has maybe noticed less cravings.     Has tried 1300 calorie diet without any " "weight loss. Has lost weight on meal replacement diet which is usually lower caloric intake. She normally eats similar to how she would eat with the 1300 calorie plan but just less strict and she will gain/ stay the same. Describes eating 3 meals and up to two snacks a day. Feels she doesn't eat enough calories. Will eat snacks to increase her caloric intake- doesn't feel hungry and doesn't have symptoms at this time. Discussed reducing the snacking to only if she is hungry. She also describes a very low carb diet- carbs mostly from fruit or veggies. She wants a more structured plan. Dicussed trial of plate method so she has a guideline for each eating episode.     Plan:  Increase metformin to 1500mg daily   Take levothyroixine on empty stomach first thin in the morning   Try plate method  Review the handouts with protein/ carbs   Try only snacking if you are hungry   See dietitian   Follow up with me in 2-3 months          Relevant Medications    metFORMIN (GLUCOPHAGE) 500 MG tablet    metFORMIN (GLUCOPHAGE) 1000 MG tablet       Other    BMI 31.0-31.9,adult     Other Visit Diagnoses       Insulin resistance        Relevant Medications    metFORMIN (GLUCOPHAGE) 500 MG tablet    metFORMIN (GLUCOPHAGE) 1000 MG tablet                 She has the following co-morbidities:        5/16/2023     2:59 PM   --   I have the following health issues associated with obesity None of the above   I have the following symptoms associated with obesity Back Pain      Recent partial thyroidectomy for follicular carcinoma    Fasting insulin and blood sugar done outside of [place] and was told she was insulin resistant. Started 500mg once daily without significant side effects. Maybe less cravings. No weight loss.          View : No data to display.                    5/16/2023     2:59 PM   Referring Provider   Please name the provider who referred you to Medical Weight Management  If you do not know, please answer \"I Don't Know\" " Vince           5/16/2023     2:59 PM   Weight History   How concerned are you about your weight? Very Concerned   I became overweight As an Adult   The following factors have contributed to my weight gain Other   Please list the other factors Not sure   I have tried the following methods to lose weight Watching Portions or Calories    Exercise   My lowest weight since age 18 was 150   My highest weight since age 18 was 190   The most weight I have ever lost was (lbs) 25   I have the following family history of obesity/being overweight My father is overweight   How has your weight changed over the last year? Gained   How many pounds? 25      Always carried excess weight- manageable as a teen   More difficult to manage in recent years. In the last several years, weight was much more difficult to manage.     Has been able to lose 10-15lb but then hard to maintain those habits and has weight regain     Several years ago she did slimgenics and got to 150- but felt too thin.     This is high weight in life (185lb).          5/16/2023     2:59 PM   Diet Recall Review with Patient   If you do eat breakfast, what types of food do you eat? eggs   If you do eat lunch, what types of food do you typically eat? salad/ protein   If you do eat supper, what types of food do you typically eat? salad/protein   If you do snack, what types of food do you typically eat? berries/Cheese   How many glasses of juice do you drink in a typical day? 0   How many of glasses of milk do you drink in a typical day? 0   How many 8oz glasses of sugar containing drinks such as Albin-Aid/sweet tea do you drink in a day? 0   How many cans/bottles of sugar pop/soda/tea/sports drinks do you drink in a day? 0   How many cans/bottles of diet pop/soda/tea or sports drink do you drink in a day? 0   How often do you have a drink of alcohol? 2-4 Times a Month   If you do drink, how many drinks might you have in a day? 1 or 2        Breakfast- 2  eggs  Snack- inconsistent- not hungry but feels she needs additional calories   Lunch - protein/ salad   Snack- inconsistent - cheese, berries, nuts- again feels she needs calories   Dinner- salad- less protein since starting metformin   No snacks after dinner     Mostly water. Coffee with creamer.     No fast food  No soda   2 weeks of 1300cal daily food journal (2/2023) plus exercise - no weight loss     Prefers real food over meal replacement         5/16/2023     2:59 PM   Eating Habits   Generally, my meals include foods like these bread, pasta, rice, potatoes, corn, crackers, sweet dessert, pop, or juice Never   Generally, my meals include foods like these fried meats, brats, burgers, french fries, pizza, cheese, chips, or ice cream Never   Eat fast food (like McDonal[employer], [employer], Taco Bell) Never   Eat at a buffet or sit-down restaurant Less Than Weekly   Eat most of my meals in front of the TV or computer Never   Often skip meals, eat at random times, have no regular eating times Almost Everyday   Rarely sit down for a meal but snack or graze throughout Never   Eat extra snacks between meals Almost Everyday   Eat most of my food at the end of the day A Few Times a Week   Eat in the middle of the night or wake up at night to eat Never   Eat extra snacks to prevent or correct low blood sugar Never   Eat to prevent acid reflux or stomach pain Never   Worry about not having enough food to eat Almost Everyday   I eat when I am depressed Never   I eat when I am stressed Never   I eat when I am bored A Few Times a Week   I eat when I am anxious Never   I eat when I am happy or as a reward Never   I feel hungry all the time even if I just have eaten Never   Feeling full is important to me Once a Week   I finish all the food on my plate even if I am already full Never   I can't resist eating delicious food or walk past the good food/smell Less Than Weekly   I eat/snack without noticing that I am eating Never   I  eat when I am preparing the meal Once a Week   I eat more than usual when I see others eating A Few Times a Week   I have trouble not eating sweets, ice cream, cookies, or chips if they are around the house Less Than Weekly   I think about food all day Never   What foods, if any, do you crave? None      Not a lot of hunger/ cravings   Increased boredom eating when working from home   Snacks more at home than at work         5/16/2023     2:59 PM   Amount of Food   I feel out of control when eating Never   I eat a large amount of food, like a loaf of bread, a box of cookies, a pint/quart of ice cream, all at once Never   I eat a large amount of food even when I am not hungry Never   I eat rapidly Never   I eat alone because I feel embarrassed and do not want others to see how much I have eaten Never   I eat until I am uncomfortably full Never   I feel bad, disgusted, or guilty after I overeat Never           5/16/2023     2:59 PM   Activity/Exercise History   How much of a typical 12 hour day do you spend sitting? Most of the Day   How much of a typical 12 hour day do you spend lying down? Less Than Half the Day   How much of a typical day do you spend walking/standing? Less Than Half the Day   How many hours (not including work) do you spend on the TV/Video Games/Computer/Tablet/Phone? 1 Hour or Less   How many times a week are you active for the purpose of exercise? 2-3 Times a Week   What keeps you from being more active? Too tired   How many total minutes do you spend doing some activity for the purpose of exercising when you exercise? 15-30 Minutes   30-40min walking every other day     PAST MEDICAL HISTORY:  Past Medical History:   Diagnosis Date    Colon polyps     Diffuse cystic mastopathy     Fibrocystic breasts, asymmetric breast tissue    Follicular carcinoma of thyroid (H) 2/21/2023    History of radiation exposure     [place] age 12 [facility] HI-RISK PREG-YOUNG MULTIP 07/28/2006    Thyroid  "nodule     Tobacco use disorder     quit 2004 5/16/2023     2:59 PM   Work/Social History Reviewed With Patient   My employment status is Full-Time   My job is Billing   How much of your job is spent on the computer or phone? 100%   How many hours do you spend commuting to work daily? 0   What is your marital status? /In a Relationship   If in a relationship, is your significant other overweight? No   If you have children, are they overweight? No   Who do you live with? Family   Who does the food shopping? I'm going food shopping           5/16/2023     2:59 PM   Mental Health History Reviewed With Patient   How often in the past 2 weeks have you felt little interest or pleasure in doing things? Not at all   Over the past 2 weeks how often have you felt down, depressed, or hopeless? Not at all           5/16/2023     2:59 PM   Sleep History Reviewed With Patient   How many hours do you sleep at night? 7       MEDICATIONS:   Current Outpatient Medications   Medication Sig Dispense Refill    levothyroxine (SYNTHROID/LEVOTHROID) 100 MCG tablet 50 mcg/day for one week, then 100 mcg/day thereafter 90 tablet 4    metFORMIN (GLUCOPHAGE) 1000 MG tablet Take 1 tablet (1,000 mg) by mouth daily (with breakfast) 1500mg daily 30 tablet 3    metFORMIN (GLUCOPHAGE) 500 MG tablet Take 1 tablet (500 mg) by mouth daily (with breakfast) 1500mg total daily 30 tablet 3       ALLERGIES:   No Known Allergies    Lab on 05/13/2023   Component Date Value Ref Range Status    Free T4 05/13/2023 1.85 (H)  0.90 - 1.70 ng/dL Final    TSH 05/13/2023 0.47  0.30 - 4.20 uIU/mL Final     PHYSICAL EXAM:  Objective    Ht 1.626 m (5' 4\")   Wt 83.9 kg (185 lb)   BMI 31.76 kg/m    Physical Exam   GENERAL: Healthy, alert and no distress  EYES: Eyes grossly normal to inspection.  No discharge or erythema, or obvious scleral/conjunctival abnormalities.  RESP: No audible wheeze, cough, or visible cyanosis.  No visible retractions or " increased work of breathing.    SKIN: Visible skin clear. No significant rash, abnormal pigmentation or lesions.  NEURO: Cranial nerves grossly intact.  Mentation and speech appropriate for age.  PSYCH: Mentation appears normal, affect normal/bright, judgement and insight intact, normal speech and appearance well-groomed.    Computed FIB-4 Calculation unavailable. Necessary lab results were not found in the last year.    Fib-4 < 1.3: No further evaluation at this point, unless other concerns  - If the Fib-4 is > 2.67,  Fibroscan and elective liver clinic referral  - Intermediate Fib-4 scores: Get a Fibroscan, consider repeating this in 1-2 years.    Sincerely,    Hayley Rainey NP        Virtual Visit Details    Type of service:  Video Visit     Originating Location (pt. Location): Home    Distant Location (provider location):  Off-site  Platform used for Video Visit: Diego

## 2023-05-26 NOTE — NURSING NOTE
Is the patient currently in the state of [place]? YES    Visit mode:VIDEO    If the visit is dropped, the patient can be reconnected by: VIDEO VISIT: Text to cell phone: [phone]    Will anyone else be joining the visit? NO      How would you like to obtain your AVS? MyChart    Are changes needed to the allergy or medication list? NO    Reason for visit: Consult (New visit for [facility])

## 2023-05-26 NOTE — ASSESSMENT & PLAN NOTE
Has always carried extra weight and had to be mindful of weight. However, more manageable earlier in life. Adult onset obesity, more difficult to manage over time. Frustratingly difficult in the last 3-5 years. Numerous attempts at weight loss with several different plans. Can lose 10-15lb on a structured meal replacement program but not sustainable long term. Now, at high weight in life and feels she has healthy habits but can't seem to lose weight. [place] fasting sugar and fasting insulin and found she had insulin resistance. Started on metformin 500mg without adverse side effects- has maybe noticed less cravings.     Has tried 1300 calorie diet without any weight loss. Has lost weight on meal replacement diet which is usually lower caloric intake. She normally eats similar to how she would eat with the 1300 calorie plan but just less strict and she will gain/ stay the same. Describes eating 3 meals and up to two snacks a day. Feels she doesn't eat enough calories. Will eat snacks to increase her caloric intake- doesn't feel hungry and doesn't have symptoms at this time. Discussed reducing the snacking to only if she is hungry. She also describes a very low carb diet- carbs mostly from fruit or veggies. She wants a more structured plan. Dicussed trial of plate method so she has a guideline for each eating episode.     Plan:  Increase metformin to 1500mg daily   Take levothyroixine on empty stomach first thin in the morning   Try plate method  Review the handouts with protein/ carbs   Try only snacking if you are hungry   See dietitian   Follow up with me in 2-3 months

## 2023-05-26 NOTE — PROGRESS NOTES
"45 minutes spent by me on the date of the encounter doing chart review, history and exam, documentation and further activities per the note    New Medical Weight Management Consult    PATIENT:  Saloni Norton  MRN:         [id number]  :         1975  ALEXIS:         2023    Dear Dr Mancilla,    I had the pleasure of seeing your patient, Saloni Norton. Full intake/assessment was done to determine barriers to weight loss success and develop a treatment plan. Saloni Norton is a 47 year old female interested in treatment of medical problems associated with excess weight. She has a height of 5' 4\", a weight of 185 lbs 0 oz, and the calculated Body mass index is 31.76 kg/m .      Assessment & Plan   Problem List Items Addressed This Visit        Digestive    Class 1 obesity with serious comorbidity and body mass index (BMI) of 31.0 to 31.9 in adult, unspecified obesity type - Primary     Has always carried extra weight and had to be mindful of weight. However, more manageable earlier in life. Adult onset obesity, more difficult to manage over time. Frustratingly difficult in the last 3-5 years. Numerous attempts at weight loss with several different plans. Can lose 10-15lb on a structured meal replacement program but not sustainable long term. Now, at high weight in life and feels she has healthy habits but can't seem to lose weight. [place] tested fasting sugar and fasting insulin and found she had insulin resistance. Started on metformin 500mg without adverse side effects- has maybe noticed less cravings.     Has tried 1300 calorie diet without any weight loss. Has lost weight on meal replacement diet which is usually lower caloric intake. She normally eats similar to how she would eat with the 1300 calorie plan but just less strict and she will gain/ stay the same. Describes eating 3 meals and up to two snacks a day. Feels she doesn't eat enough calories. Will eat " "snacks to increase her caloric intake- doesn't feel hungry and doesn't have symptoms at this time. Discussed reducing the snacking to only if she is hungry. She also describes a very low carb diet- carbs mostly from fruit or veggies. She wants a more structured plan. Dicussed trial of plate method so she has a guideline for each eating episode.     Plan:  Increase metformin to 1500mg daily   Take levothyroixine on empty stomach first thin in the morning   Try plate method  Review the handouts with protein/ carbs   Try only snacking if you are hungry   See dietitian   Follow up with me in 2-3 months          Relevant Medications    metFORMIN (GLUCOPHAGE) 500 MG tablet    metFORMIN (GLUCOPHAGE) 1000 MG tablet       Other    BMI 31.0-31.9,adult   Other Visit Diagnoses     Insulin resistance        Relevant Medications    metFORMIN (GLUCOPHAGE) 500 MG tablet    metFORMIN (GLUCOPHAGE) 1000 MG tablet               She has the following co-morbidities:        5/16/2023     2:59 PM   --   I have the following health issues associated with obesity None of the above   I have the following symptoms associated with obesity Back Pain      Recent partial thyroidectomy for follicular carcinoma    Fasting insulin and blood sugar done outside of [place] and was told she was insulin resistant. Started 500mg once daily without significant side effects. Maybe less cravings. No weight loss.          View : No data to display.                    5/16/2023     2:59 PM   Referring Provider   Please name the provider who referred you to Medical Weight Management  If you do not know, please answer \"I Don't Know\" Dr Clarke           5/16/2023     2:59 PM   Weight History   How concerned are you about your weight? Very Concerned   I became overweight As an Adult   The following factors have contributed to my weight gain Other   Please list the other factors Not sure   I have tried the following methods to lose weight Watching Portions or " Calories    Exercise   My lowest weight since age 18 was 150   My highest weight since age 18 was 190   The most weight I have ever lost was (lbs) 25   I have the following family history of obesity/being overweight My father is overweight   How has your weight changed over the last year? Gained   How many pounds? 25      Always carried excess weight- manageable as a teen   More difficult to manage in recent years. In the last several years, weight was much more difficult to manage.     Has been able to lose 10-15lb but then hard to maintain those habits and has weight regain     Several years ago she did slimgenics and got to 150- but felt too thin.     This is high weight in life (185lb).          5/16/2023     2:59 PM   Diet Recall Review with Patient   If you do eat breakfast, what types of food do you eat? eggs   If you do eat lunch, what types of food do you typically eat? salad/ protein   If you do eat supper, what types of food do you typically eat? salad/protein   If you do snack, what types of food do you typically eat? berries/Cheese   How many glasses of juice do you drink in a typical day? 0   How many of glasses of milk do you drink in a typical day? 0   How many 8oz glasses of sugar containing drinks such as Albin-Aid/sweet tea do you drink in a day? 0   How many cans/bottles of sugar pop/soda/tea/sports drinks do you drink in a day? 0   How many cans/bottles of diet pop/soda/tea or sports drink do you drink in a day? 0   How often do you have a drink of alcohol? 2-4 Times a Month   If you do drink, how many drinks might you have in a day? 1 or 2        Breakfast- 2 eggs  Snack- inconsistent- not hungry but feels she needs additional calories   Lunch - protein/ salad   Snack- inconsistent - cheese, berries, nuts- again feels she needs calories   Dinner- salad- less protein since starting metformin   No snacks after dinner     Mostly water. Coffee with creamer.     No fast food  No soda   2 weeks of  1300cal daily food journal (2/2023) plus exercise - no weight loss     Prefers real food over meal replacement         5/16/2023     2:59 PM   Eating Habits   Generally, my meals include foods like these bread, pasta, rice, potatoes, corn, crackers, sweet dessert, pop, or juice Never   Generally, my meals include foods like these fried meats, brats, burgers, french fries, pizza, cheese, chips, or ice cream Never   Eat fast food (like [employer], [employer], Taco Bell) Never   Eat at a buffet or sit-down restaurant Less Than Weekly   Eat most of my meals in front of the TV or computer Never   Often skip meals, eat at random times, have no regular eating times Almost Everyday   Rarely sit down for a meal but snack or graze throughout Never   Eat extra snacks between meals Almost Everyday   Eat most of my food at the end of the day A Few Times a Week   Eat in the middle of the night or wake up at night to eat Never   Eat extra snacks to prevent or correct low blood sugar Never   Eat to prevent acid reflux or stomach pain Never   Worry about not having enough food to eat Almost Everyday   I eat when I am depressed Never   I eat when I am stressed Never   I eat when I am bored A Few Times a Week   I eat when I am anxious Never   I eat when I am happy or as a reward Never   I feel hungry all the time even if I just have eaten Never   Feeling full is important to me Once a Week   I finish all the food on my plate even if I am already full Never   I can't resist eating delicious food or walk past the good food/smell Less Than Weekly   I eat/snack without noticing that I am eating Never   I eat when I am preparing the meal Once a Week   I eat more than usual when I see others eating A Few Times a Week   I have trouble not eating sweets, ice cream, cookies, or chips if they are around the house Less Than Weekly   I think about food all day Never   What foods, if any, do you crave? None      Not a lot of hunger/ cravings    Increased boredom eating when working from home   Snacks more at home than at work         5/16/2023     2:59 PM   Amount of Food   I feel out of control when eating Never   I eat a large amount of food, like a loaf of bread, a box of cookies, a pint/quart of ice cream, all at once Never   I eat a large amount of food even when I am not hungry Never   I eat rapidly Never   I eat alone because I feel embarrassed and do not want others to see how much I have eaten Never   I eat until I am uncomfortably full Never   I feel bad, disgusted, or guilty after I overeat Never           5/16/2023     2:59 PM   Activity/Exercise History   How much of a typical 12 hour day do you spend sitting? Most of the Day   How much of a typical 12 hour day do you spend lying down? Less Than Half the Day   How much of a typical day do you spend walking/standing? Less Than Half the Day   How many hours (not including work) do you spend on the TV/Video Games/Computer/Tablet/Phone? 1 Hour or Less   How many times a week are you active for the purpose of exercise? 2-3 Times a Week   What keeps you from being more active? Too tired   How many total minutes do you spend doing some activity for the purpose of exercising when you exercise? 15-30 Minutes   30-40min walking every other day     PAST MEDICAL HISTORY:  Past Medical History:   Diagnosis Date     Colon polyps      Diffuse cystic mastopathy     Fibrocystic breasts, asymmetric breast tissue     Follicular carcinoma of thyroid (H) 2/21/2023     History of radiation exposure     Cherbyl age 12 [facility] HI-RISK PREG-YOUNG MULTIP 07/28/2006     Thyroid nodule      Tobacco use disorder     quit 2004 5/16/2023     2:59 PM   Work/Social History Reviewed With Patient   My employment status is Full-Time   My job is Billing   How much of your job is spent on the computer or phone? 100%   How many hours do you spend commuting to work daily? 0   What is your marital status?  "/In a Relationship   If in a relationship, is your significant other overweight? No   If you have children, are they overweight? No   Who do you live with? Family   Who does the food shopping? I'm going food shopping           5/16/2023     2:59 PM   Mental Health History Reviewed With Patient   How often in the past 2 weeks have you felt little interest or pleasure in doing things? Not at all   Over the past 2 weeks how often have you felt down, depressed, or hopeless? Not at all           5/16/2023     2:59 PM   Sleep History Reviewed With Patient   How many hours do you sleep at night? 7       MEDICATIONS:   Current Outpatient Medications   Medication Sig Dispense Refill     levothyroxine (SYNTHROID/LEVOTHROID) 100 MCG tablet 50 mcg/day for one week, then 100 mcg/day thereafter 90 tablet 4     metFORMIN (GLUCOPHAGE) 1000 MG tablet Take 1 tablet (1,000 mg) by mouth daily (with breakfast) 1500mg daily 30 tablet 3     metFORMIN (GLUCOPHAGE) 500 MG tablet Take 1 tablet (500 mg) by mouth daily (with breakfast) 1500mg total daily 30 tablet 3       ALLERGIES:   No Known Allergies    Lab on 05/13/2023   Component Date Value Ref Range Status     Free T4 05/13/2023 1.85 (H)  0.90 - 1.70 ng/dL Final     TSH 05/13/2023 0.47  0.30 - 4.20 uIU/mL Final     PHYSICAL EXAM:  Objective    Ht 1.626 m (5' 4\")   Wt 83.9 kg (185 lb)   BMI 31.76 kg/m    Physical Exam   GENERAL: Healthy, alert and no distress  EYES: Eyes grossly normal to inspection.  No discharge or erythema, or obvious scleral/conjunctival abnormalities.  RESP: No audible wheeze, cough, or visible cyanosis.  No visible retractions or increased work of breathing.    SKIN: Visible skin clear. No significant rash, abnormal pigmentation or lesions.  NEURO: Cranial nerves grossly intact.  Mentation and speech appropriate for age.  PSYCH: Mentation appears normal, affect normal/bright, judgement and insight intact, normal speech and appearance " well-groomed.    Computed FIB-4 Calculation unavailable. Necessary lab results were not found in the last year.    Fib-4 < 1.3: No further evaluation at this point, unless other concerns  - If the Fib-4 is > 2.67,  Fibroscan and elective liver clinic referral  - Intermediate Fib-4 scores: Get a Fibroscan, consider repeating this in 1-2 years.    Sincerely,    Hayley Rainey, NP

## 2023-05-26 NOTE — PATIENT INSTRUCTIONS
"Thank you for allowing us the privilege of caring for you. We hope we provided you with the excellent service you deserve.   Please let us know if there is anything else we can do for you so that we can be sure you are completely satisfied with your care experience.    To ensure the quality of our services you may be receiving a patient satisfaction survey from an independent patient satisfaction monitoring company.    The greatest compliment you can give is a \"Likely to Recommend\"    Your visit was with Hayley Rainey NP today.    Instructions per today's visit:     Akil Norton, it was great to visit with you today.  Here is a review of our visit.  If our clinic scheduler is not able to reach you please call [phone] to schedule your next appointments.    Plan:  Increase metformin to 1500mg daily   Take levothyroixine on empty stomach first thin in the morning   Try plate method  Review the handouts with protein/ carbs   Try only snacking if you are hungry   See dietitian   Follow up with me in 2-3 months       Information about Video Visits with [facility]: video visit information  _________________________________________________________________________________________________________________________________________________________  If you are asked by your clinic team to have your blood pressure checked:  [facility] do offer several locations for blood pressure checks. Please follow the below link to schedule an appointment. Scheduling an appointment at the pharmacy for a blood pressure check is now preferred.    Appointment Plus (appointment-plus.Fingerprint)  _________________________________________________________________________________________________________________________________________________________  Important contact and scheduling information:  Please call our contact center at [phone] to schedule your next appointments.  To find a lab location near you, please call [phone].  For any nursing questions or concerns call Elly Starr LPN at [phone] or Rona Gallegos RN at [phone]  Please call during clinic hours Monday through Friday 8:00a - 4:00p if you have questions or you can contact us via [employer] at anytime and we will reply during clinic hours.    Lab results will be communicated through My Chart or letter (if My Chart not used). Please call the clinic if you have not received communication after 1 week or if you have any questions.?  Clinic Fax: [phone]    _________________________________________________________________________________________________________________________________________________________  Meal Replacement Products:    Here is the link to our new e-store where you can purchase our meal replacement products    [facility] E-Store  [employer]/store    The one week starter kit is a great way to sample a variety of products and see what works for you.    If you want more information about the product go to: [email or web address]    If you are an employee or [facility] or [facility] please contact your care team for a 10% estore discount    Free Shipping for orders over $75     Benefits of meal replacements products:    Portion and calorie control  Improved nutrition  Structured eating  Simplified food choices  Avoid contact with trigger foods  _________________________________________________________________________________________________________________________________________________________  Interested in working with a health ?  Health coaches work with you to improve your overall health and wellbeing.  They look at the whole person, and may involve discussion of different areas of life, including, but not limited to the four pillars of health (sleep, exercise, nutrition, and stress management). Discuss with your care team if you would like to start working a health  .  Health Coaching-3 Pack: Schedule by calling [phone]    $99 for three health coaching visits    Visits may be done in person or via phone    Coaching is a partnership between the  and the client; Coaches do not prescribe or diagnose    Coaching helps inspire the client to reach his/her personal goals   _________________________________________________________________________________________________________________________________________________________  24 Week Healthy Lifestyle Plan:    Our mission in the 24-week Healthy Lifestyle Plan is to provide you with individualized care by giving you the tools, education and support you need to lose weight and maintain a healthy lifestyle. In your 24-week journey, you ll be supported by a dedicated weight loss team that includes registered dietitians, medical weight management providers, health coaches, and nurses -- all with special expertise in weight loss -- to help you every step of the way.     Monthly meetings with your registered dietician or medical weight management provider help to review your progress, update your care plan, and make any adjustments needed to ensure success. Between these visits, weekly and bi-weekly health  visits will help you focus on the four pillars of weight loss -- stress, sleep, nutrition, and exercise -- and how you can best adapt each to achieve sustainable weight loss results.    In addition, you will be given exclusive access to online wellbeing classes through [employer].  Your initial visit will be with a medical weight management provider who will help to understand your weight loss goals and ensure this program is the right fit for you. Please let our team know if you are interested in the 24 week plan by sending a message to your care team or calling [phone] to  schedule.  _________________________________________________________________________________________________________________________________________________________  __________  [facility] Get Moving Program  Our team of physical therapists is trained to help you understand and take control of your condition. They will perform a thorough evaluation to determine your ability for activity and develop a customized plan to fit your goals and physical ability.  Scheduling: Unsure if the Get Moving program is right for you? Discuss the program with your medical provider or diabetes educator. You can also call us at [phone] to ask questions or schedule an appointment.   [facility] Get Moving Program  ____________________________________________________________________________________________________________________________________________________________________________   [facility] Diabetes Prevention Program (DPP)  If you have prediabetes and Medicare please contact us via [employer] to learn more about the Diabetes Prevention Program (DPP)  Program Details:   [facility] offers the year-long Diabetes Prevention Program (DPP). The program helps you to make lifestyle changes that prevent or delay type 2 diabetes by supporting healthy eating, increased physical activity, stress reduction and use of coping skills.   On average, previous [facility] DPP cohorts have lost and maintained at least 5% of their starting weight throughout the program and averaged more than 150 minutes of physical activity per week.  Participants meet weekly for one-hour group sessions over sixteen weeks, every other week for the next 8 weeks, and monthly for the last six months.   A year-long maintenance program is also available for participants who complete the first year.   Location & Cost:   During the COVID-19 Public Health Emergency, the program is offered virtually. When in-person classes can resume, they  will be held at [facility].  For people with Medicare, the program is covered in full. A self-pay option will also be available for those with non-Medicare insurance plans.   ______________________________________________________________________________________________________________________________________________________________________________________________________________________________    To work with a Behavioral Health Psychologist:    Call to schedule:    Mani Silverio - [phone]  Anna Armas - [phone]  Charley Zavaleta - [phone]  Polina Rosa - [phone]   Sadaf Kwon PhD (cannot accept Medicare) [phone]        Thank you,   [facility] Comprehensive Weight Management Team    MEDICATION STARTED AT THIS APPOINTMENT    We are starting metformin.  Starting instructions:    Immediate release tablet: Take 1 tablet by mouth daily with a meal for 1 week, then increase to 1 tablet twice daily with meals.   Extended release tablet: Take 1 tablet by mouth daily with a meal for 1 week, then increase to 2 tablets daily with a meal or 1 tablet twice daily with meals.      Contact the nurse via [facility] or call [phone] if you have any questions or concerns    Metformin is a medication that is used to assist with lowering blood sugars in patients that have Pre-Diabetes or Diabetes. It has also been found to help with weight loss.    Metformin helps to lower blood sugars by lowering the amount of sugar (glucose) your liver produces that would otherwise cause your blood sugar to increase. It also makes your body respond better to insulin (the product that lowers your blood sugar). It is unclear how metformin assists with weight loss.     Side-effects. Metformin is generally well tolerated. The main side-effects we see are diarrhea, nausea and stomach upset - this tends to subside over time as your body gets used to the medication. Taking this  medications with food will lower these side effects.    Low blood sugar (hypoglycemia) is rare to occur with metformin, but can occur if you do not eat enough or are taking other medications that assist to lower blood sugar. The signs of low blood sugar are:  Weakness  Shaky   Hungry  Sweating  Confusion    See below for ways to treat low blood sugar without adding in lots of extra calories.    Treating Low Blood Sugar    If you have symptoms of low blood sugar (sweating, shaking, dizzy, confused) eat 15 grams of carbs and wait 15 minutes:    Glucose Tabs are best for sugars under 70 -  Dex4 or BD Glucose tablets are good, you will need to take 3-4 of these to equal 15 grams.     One small box of raisins  4 oz fruit juice box or   cup fruit juice  1 small apple  1 small banana    cup canned fruit in water    English muffin or a slice of bread with jelly   1 low fat frozen waffle with sugar-free syrup    cup cottage cheese with   cup frozen or fresh blueberries  1 cup skim or low-fat milk    cup whole grain cereal  4-6 crackers such as Triscuits    Please refer to the pharmacy insert for more information on side-effects. Please call the clinic should you have more questions regarding this medication.      In order to get refills of this or any medication we prescribe you must be seen in the medical weight mgmt clinic every 2-3 months.

## 2023-06-02 ENCOUNTER — TELEPHONE (OUTPATIENT)
Dept: ENDOCRINOLOGY | Facility: CLINIC | Age: 48
End: 2023-06-02

## 2023-06-02 NOTE — TELEPHONE ENCOUNTER
Spoke with patient for 10 AM initial RD visit. Patient stated she received adequate nutrition education at initial provider visit. She is feeling comfortable with making diet changes and seeing progress with her weight loss. Patient declined dietitian visit today. Encouraged follow up as needed.     Irene Cox RD, LD  Clinic # [phone]

## 2023-06-08 ENCOUNTER — PREP FOR PROCEDURE (OUTPATIENT)
Dept: SURGERY | Facility: CLINIC | Age: 48
End: 2023-06-08
Payer: COMMERCIAL

## 2023-06-08 DIAGNOSIS — C73 FOLLICULAR THYROID CARCINOMA (H): Primary | ICD-10-CM

## 2023-06-16 ENCOUNTER — TELEPHONE (OUTPATIENT)
Dept: SURGERY | Facility: CLINIC | Age: 48
End: 2023-06-16
Payer: COMMERCIAL

## 2023-06-16 ENCOUNTER — HOSPITAL ENCOUNTER (OUTPATIENT)
Dept: CT IMAGING | Facility: CLINIC | Age: 48
Discharge: HOME | End: 2023-06-16
Payer: COMMERCIAL

## 2023-06-16 DIAGNOSIS — E89.0 HISTORY OF PARTIAL THYROIDECTOMY: ICD-10-CM

## 2023-06-16 DIAGNOSIS — R91.8 PULMONARY NODULES: ICD-10-CM

## 2023-06-16 DIAGNOSIS — C73 FOLLICULAR CARCINOMA OF THYROID (H): ICD-10-CM

## 2023-06-16 PROCEDURE — 71250 CT THORAX DX C-: CPT

## 2023-06-16 NOTE — TELEPHONE ENCOUNTER
Type of surgery: COMPLETION THYROIDECTOMY      Location of surgery: [facility]  Date and time of surgery: 8/30/2023 @ 7:30 AM  Surgeon: Charley Humphreys MD  Pre-Op Appt Date: PATIENT TO SCHEDULE    Post-Op Appt Date: PATIENT TO SCHEDULE     Packet sent out: Yes  Pre-cert/Authorization completed:  Not Applicable  Date: 6/16/23        COMPLETION THYROIDECTOMY    GENERAL PT INST TO HAVE H&P WITH  MIN REQ PA ASSIST MGB NMS

## 2023-07-02 ENCOUNTER — HEALTH MAINTENANCE LETTER (OUTPATIENT)
Age: 48
End: 2023-07-02

## 2023-08-14 ENCOUNTER — OFFICE VISIT (OUTPATIENT)
Dept: FAMILY MEDICINE | Facility: CLINIC | Age: 48
End: 2023-08-14
Payer: COMMERCIAL

## 2023-08-14 VITALS
BODY MASS INDEX: 31.24 KG/M2 | HEIGHT: 64 IN | RESPIRATION RATE: 12 BRPM | SYSTOLIC BLOOD PRESSURE: 122 MMHG | DIASTOLIC BLOOD PRESSURE: 84 MMHG | HEART RATE: 76 BPM | OXYGEN SATURATION: 97 % | TEMPERATURE: 98.3 F | WEIGHT: 183 LBS

## 2023-08-14 DIAGNOSIS — Z01.818 PREOP GENERAL PHYSICAL EXAM: Primary | ICD-10-CM

## 2023-08-14 DIAGNOSIS — R53.83 OTHER FATIGUE: ICD-10-CM

## 2023-08-14 DIAGNOSIS — C73 FOLLICULAR CARCINOMA OF THYROID (H): ICD-10-CM

## 2023-08-14 DIAGNOSIS — E89.0 HISTORY OF PARTIAL THYROIDECTOMY: ICD-10-CM

## 2023-08-14 PROBLEM — R73.03 PREDIABETES: Status: RESOLVED | Noted: 2023-08-14 | Resolved: 2023-08-14

## 2023-08-14 PROBLEM — R73.03 PREDIABETES: Status: ACTIVE | Noted: 2023-08-14

## 2023-08-14 LAB
ANION GAP SERPL CALCULATED.3IONS-SCNC: 9 MMOL/L (ref 7–15)
BUN SERPL-MCNC: 16 MG/DL (ref 6–20)
CALCIUM SERPL-MCNC: 9.1 MG/DL (ref 8.6–10)
CHLORIDE SERPL-SCNC: 104 MMOL/L (ref 98–107)
CREAT SERPL-MCNC: 0.89 MG/DL (ref 0.51–0.95)
DEPRECATED HCO3 PLAS-SCNC: 26 MMOL/L (ref 22–29)
ERYTHROCYTE [DISTWIDTH] IN BLOOD BY AUTOMATED COUNT: 13.2 % (ref 10–15)
GFR SERPL CREATININE-BSD FRML MDRD: 80 ML/MIN/1.73M2
GLUCOSE SERPL-MCNC: 106 MG/DL (ref 70–99)
HCT VFR BLD AUTO: 43.3 % (ref 35–47)
HGB BLD-MCNC: 14.5 G/DL (ref 11.7–15.7)
IRON BINDING CAPACITY (ROCHE): 321 UG/DL (ref 240–430)
IRON SATN MFR SERPL: 13 % (ref 15–46)
IRON SERPL-MCNC: 42 UG/DL (ref 37–145)
MCH RBC QN AUTO: 28.2 PG (ref 26.5–33)
MCHC RBC AUTO-ENTMCNC: 33.5 G/DL (ref 31.5–36.5)
MCV RBC AUTO: 84 FL (ref 78–100)
PLATELET # BLD AUTO: 201 10E3/UL (ref 150–450)
POTASSIUM SERPL-SCNC: 4.5 MMOL/L (ref 3.4–5.3)
RBC # BLD AUTO: 5.14 10E6/UL (ref 3.8–5.2)
SODIUM SERPL-SCNC: 139 MMOL/L (ref 136–145)
WBC # BLD AUTO: 7.4 10E3/UL (ref 4–11)

## 2023-08-14 PROCEDURE — 99214 OFFICE O/P EST MOD 30 MIN: CPT | Performed by: FAMILY MEDICINE

## 2023-08-14 PROCEDURE — 83540 ASSAY OF IRON: CPT | Performed by: FAMILY MEDICINE

## 2023-08-14 PROCEDURE — 80048 BASIC METABOLIC PNL TOTAL CA: CPT | Performed by: FAMILY MEDICINE

## 2023-08-14 PROCEDURE — 85027 COMPLETE CBC AUTOMATED: CPT | Performed by: FAMILY MEDICINE

## 2023-08-14 PROCEDURE — 82306 VITAMIN D 25 HYDROXY: CPT | Performed by: FAMILY MEDICINE

## 2023-08-14 PROCEDURE — 36415 COLL VENOUS BLD VENIPUNCTURE: CPT | Performed by: FAMILY MEDICINE

## 2023-08-14 PROCEDURE — 83550 IRON BINDING TEST: CPT | Performed by: FAMILY MEDICINE

## 2023-08-14 NOTE — PROGRESS NOTES
[facility]  [address] [address]  Phone: [phone]  Primary Provider: Marielle Mancilla  Pre-op Performing Provider: MARIELLE MANCILLA      PREOPERATIVE EVALUATION:  Today's date: 8/14/2023    Saloni Norton is a 48 year old female who presents for a preoperative evaluation.      8/14/2023     6:46 AM   Additional Questions   Roomed by Milla ESTRADA       Surgical Information:  Surgery/Procedure: Complete Thyroidectomy  Surgery Location: [facility]  Surgeon: Dr. Humphreys  Surgery Date: 08/30/2023  Time of Surgery: TBD  Where patient plans to recover: At home with family  Fax number for surgical facility: Note does not need to be faxed, will be available electronically in Epic.    Assessment & Plan     The proposed surgical procedure is considered INTERMEDIATE risk.    Preop general physical exam  - CBC with platelets; Future  - Basic metabolic panel  (Ca, Cl, CO2, Creat, Gluc, K, Na, BUN); Future  - CBC with platelets  - Basic metabolic panel  (Ca, Cl, CO2, Creat, Gluc, K, Na, BUN)    Follicular carcinoma of thyroid (H)    History of partial thyroidectomy    Other fatigue  - Iron and iron binding capacity; Future  - Vitamin D Deficiency; Future  - Iron and iron binding capacity  - Vitamin D Deficiency       - No identified additional risk factors other than previously addressed    Antiplatelet or Anticoagulation Medication Instructions:   - Patient is on no antiplatelet or anticoagulation medications.    Additional Medication Instructions:  Patient will take no medications AM of surgery    RECOMMENDATION:  APPROVAL GIVEN to proceed with proposed procedure, without further diagnostic evaluation.      Subjective     HPI related to upcoming procedure: history of thyroid cancer, s/p partial thyroidectomy        8/8/2023     8:27 AM   Preop Questions   1. Have you ever had a heart attack or stroke? No   2. Have you ever had surgery on your heart or blood vessels,  such as a stent placement, a coronary artery bypass, or surgery on an artery in your head, neck, heart, or legs? No   3. Do you have chest pain with activity? No   4. Do you have a history of  heart failure? No   5. Do you currently have a cold, bronchitis or symptoms of other infection? No   6. Do you have a cough, shortness of breath, or wheezing? No   7. Do you or anyone in your family have previous history of blood clots? No   8. Do you or does anyone in your family have a serious bleeding problem such as prolonged bleeding following surgeries or cuts? No   9. Have you ever had problems with anemia or been told to take iron pills? No   10. Have you had any abnormal blood loss such as black, tarry or bloody stools, or abnormal vaginal bleeding? No   11. Have you ever had a blood transfusion? No   12. Are you willing to have a blood transfusion if it is medically needed before, during, or after your surgery? Yes   13. Have you or any of your relatives ever had problems with anesthesia? No   14. Do you have sleep apnea, excessive snoring or daytime drowsiness? No   15. Do you have any artifical heart valves or other implanted medical devices like a pacemaker, defibrillator, or continuous glucose monitor? No   16. Do you have artificial joints? No   17. Are you allergic to latex? No   18. Is there any chance that you may be pregnant? No       Health Care Directive:  Patient does not have a Health Care Directive or Living Will: Discussed advance care planning with patient; information given to patient to review.    Preoperative Review of :   reviewed - no record of controlled substances prescribed.      Status of Chronic Conditions:  See problem list for active medical problems.  Problems all longstanding and stable, except as noted/documented.  See ROS for pertinent symptoms related to these conditions.    Review of Systems  Constitutional, neuro, ENT, endocrine, pulmonary, cardiac, gastrointestinal,  genitourinary, musculoskeletal, integument and psychiatric systems are negative, except as otherwise noted.    Patient Active Problem List    Diagnosis Date Noted    BMI 31.0-31.9,adult 02/21/2023     Priority: Medium    Follicular carcinoma of thyroid (H) 02/21/2023     Priority: Medium    History of partial thyroidectomy 02/17/2023     Priority: Medium    Former smoker 12/27/2022     Priority: Medium    Personal history of irradiation, presenting hazards to health 12/05/2022     Priority: Medium    Colon polyp 09/28/2015     Priority: Medium     Formatting of this note might be different from the original.  9/28/2015 Colonoscopy. Repeat in 3 Years  Formatting of this note might be different from the original.  Formatting of this note might be different from the original.  9/28/2015 Colonoscopy. Repeat in 3 Years      Skin mole 06/10/2013     Priority: Medium    Class 1 obesity with serious comorbidity and body mass index (BMI) of 31.0 to 31.9 in adult, unspecified obesity type 08/15/2007     Priority: Medium     Problem list name updated by automated process. Provider to review        Past Medical History:   Diagnosis Date    Colon polyps     Diffuse cystic mastopathy     Fibrocystic breasts, asymmetric breast tissue    Follicular carcinoma of thyroid (H) 2/21/2023    History of radiation exposure     [place] age 12 [facility]    SUPRV HI-RISK PREG-YOUNG MULTIP 07/28/2006    Thyroid nodule     Tobacco use disorder     quit 2004     Past Surgical History:   Procedure Laterality Date    BIOPSY  11/2022    THYROIDECTOMY Left 1/4/2023    Procedure: Left thyroid lobectomy and isthmusectomy;  Surgeon: Charley Humphreys MD;  Location: [facility] OR    TONSILLECTOMY  01/01/1980    unilateral (right) tonsillectomy     Current Outpatient Medications   Medication Sig Dispense Refill    levothyroxine (SYNTHROID/LEVOTHROID) 100 MCG tablet 50 mcg/day for one week, then 100 mcg/day thereafter 90 tablet 4    metFORMIN (GLUCOPHAGE) 1000  "MG tablet Take 1 tablet (1,000 mg) by mouth daily (with breakfast) 1500mg daily 30 tablet 3    metFORMIN (GLUCOPHAGE) 500 MG tablet Take 1 tablet (500 mg) by mouth daily (with breakfast) 1500mg total daily 30 tablet 3       No Known Allergies     Social History     Tobacco Use    Smoking status: Former     Packs/day: 0.50     Years: 10.00     Pack years: 5.00     Types: Cigarettes     Quit date: 2004     Years since quittin.2    Smokeless tobacco: Never    Tobacco comments:     total smoking about 5 pack years   Substance Use Topics    Alcohol use: Yes     Comment: less than monthly     Family History   Problem Relation Age of Onset    Heart Disease Mother         pacemaker    Thyroid nodules Mother     Thyroid Disease Mother     Heart Disease Father     Diabetes Father     C.A.D. Father         MI age 62    Alzheimer Disease Father     Hypertension Father     Hyperlipidemia Father     Lipids Brother     Breast Cancer Maternal Grandmother         and MAunt had post-menopausal cancer    Neurologic Disorder Paternal Grandmother         Alzheimer's    Lymphoma Cousin     Cancer - colorectal No family hx of     Thyroid Cancer No family hx of      History   Drug Use No         Objective     /84 (Cuff Size: Adult Regular)   Pulse 76   Temp 98.3  F (36.8  C)   Resp 12   Ht 1.626 m (5' 4\")   Wt 83 kg (183 lb)   SpO2 97%   BMI 31.41 kg/m      Physical Exam    GENERAL APPEARANCE: healthy, alert and no distress     EYES: EOMI, PERRL     HENT: ear canals and TM's normal and nose and mouth without ulcers or lesions     NECK: no adenopathy     RESP: lungs clear to auscultation - no rales, rhonchi or wheezes     CV: regular rates and rhythm, normal S1 S2, no S3 or S4 and no murmur, click or rub     ABDOMEN:  soft, nontender, no HSM or masses and bowel sounds normal     MS: extremities normal- no gross deformities noted, no evidence of inflammation in joints, FROM in all extremities.     SKIN: no suspicious " lesions or rashes     NEURO: Normal strength and tone, sensory exam grossly normal, mentation intact and speech normal     PSYCH: mentation appears normal. and affect normal/bright     LYMPHATICS: No cervical adenopathy    Recent Labs   Lab Test 12/27/22  1717 06/04/22  1016   HGB 14.2 15.1    175    139   POTASSIUM 4.1 4.2   CR 0.74 0.72   A1C  --  5.2        Diagnostics:  Labs pending at this time.  Results will be reviewed when available.   No EKG required, no history of coronary heart disease, significant arrhythmia, peripheral arterial disease or other structural heart disease.    Revised Cardiac Risk Index (RCRI):  The patient has the following serious cardiovascular risks for perioperative complications:   - No serious cardiac risks = 0 points     RCRI Interpretation: 0 points: Class I (very low risk - 0.4% complication rate)         Signed Electronically by: Marielle Mancilla MD  Copy of this evaluation report is provided to requesting physician.

## 2023-08-14 NOTE — PATIENT INSTRUCTIONS
For informational purposes only. Not to replace the advice of your health care provider. Copyright   2003,  [facility]. All rights reserved. Clinically reviewed by Keiko Wilson MD. [place] [id number] - REV .  Preparing for Your Surgery  Getting started  A nurse will call you to review your health history and instructions. They will give you an arrival time based on your scheduled surgery time. Please be ready to share:  Your doctor's clinic name and phone number  Your medical, surgical, and anesthesia history  A list of allergies and sensitivities  A list of medicines, including herbal treatments and over-the-counter drugs  Whether the patient has a legal guardian (ask how to send us the papers in advance)  Please tell us if you're pregnant--or if there's any chance you might be pregnant. Some surgeries may injure a fetus (unborn baby), so they require a pregnancy test. Surgeries that are safe for a fetus don't always need a test, and you can choose whether to have one.   If you have a child who's having surgery, please ask for a copy of Preparing for Your Child's Surgery.    Preparing for surgery  Within 10 to 30 days of surgery: Have a pre-op exam (sometimes called an H&P, or History and Physical). This can be done at a clinic or pre-operative center.  If you're having a , you may not need this exam. Talk to your care team.  At your pre-op exam, talk to your care team about all medicines you take. If you need to stop any medicines before surgery, ask when to start taking them again.  We do this for your safety. Many medicines can make you bleed too much during surgery. Some change how well surgery (anesthesia) drugs work.  Call your insurance company to let them know you're having surgery. (If you don't have insurance, call [phone].)  Call your clinic if there's any change in your health. This includes signs of a cold or flu (sore throat, runny nose, cough, rash, fever). It also  includes a scrape or scratch near the surgery site.  If you have questions on the day of surgery, call your hospital or surgery center.  Eating and drinking guidelines  For your safety: Unless your surgeon tells you otherwise, follow the guidelines below.  Eat and drink as usual until 8 hours before you arrive for surgery. After that, no food or milk.  Drink clear liquids until 2 hours before you arrive. These are liquids you can see through, like water, Gatorade, and Propel Water. They also include plain black coffee and tea (no cream or milk), candy, and breath mints. You can spit out gum when you arrive.  If you drink alcohol: Stop drinking it the night before surgery.  If your care team tells you to take medicine on the morning of surgery, it's okay to take it with a sip of water.  Preventing infection  Shower or bathe the night before and morning of your surgery. Follow the instructions your clinic gave you. (If no instructions, use regular soap.)  Don't shave or clip hair near your surgery site. We'll remove the hair if needed.  Don't smoke or vape the morning of surgery. You may chew nicotine gum up to 2 hours before surgery. A nicotine patch is okay.  Note: Some surgeries require you to completely quit smoking and nicotine. Check with your surgeon.  Your care team will make every effort to keep you safe from infection. We will:  Clean our hands often with soap and water (or an alcohol-based hand rub).  Clean the skin at your surgery site with a special soap that kills germs.  Give you a special gown to keep you warm. (Cold raises the risk of infection.)  Wear special hair covers, masks, gowns and gloves during surgery.  Give antibiotic medicine, if prescribed. Not all surgeries need antibiotics.  What to bring on the day of surgery  Photo ID and insurance card  Copy of your health care directive, if you have one  Glasses and hearing aids (bring cases)  You can't wear contacts during surgery  Inhaler and eye  drops, if you use them (tell us about these when you arrive)  CPAP machine or breathing device, if you use them  A few personal items, if spending the night  If you have . . .  A pacemaker, ICD (cardiac defibrillator) or other implant: Bring the ID card.  An implanted stimulator: Bring the remote control.  A legal guardian: Bring a copy of the certified (court-stamped) guardianship papers.  Please remove any jewelry, including body piercings. Leave jewelry and other valuables at home.  If you're going home the day of surgery  You must have a responsible adult drive you home. They should stay with you overnight as well.  If you don't have someone to stay with you, and you aren't safe to go home alone, we may keep you overnight. Insurance often won't pay for this.  After surgery  If it's hard to control your pain or you need more pain medicine, please call your surgeon's office.  Questions?   If you have any questions for your care team, list them here: _________________________________________________________________________________________________________________________________________________________________________ ____________________________________ ____________________________________ ____________________________________    How to Take Your Medication Before Surgery  - HOLD (do not take) all of your prescriptions

## 2023-08-15 LAB — DEPRECATED CALCIDIOL+CALCIFEROL SERPL-MC: 31 UG/L (ref 20–75)

## 2023-08-22 ENCOUNTER — VIRTUAL VISIT (OUTPATIENT)
Dept: ENDOCRINOLOGY | Facility: CLINIC | Age: 48
End: 2023-08-22
Payer: COMMERCIAL

## 2023-08-22 DIAGNOSIS — C73 FOLLICULAR CARCINOMA OF THYROID (H): Primary | ICD-10-CM

## 2023-08-22 DIAGNOSIS — R91.8 PULMONARY NODULES: ICD-10-CM

## 2023-08-22 DIAGNOSIS — E89.0 HISTORY OF PARTIAL THYROIDECTOMY: ICD-10-CM

## 2023-08-22 PROCEDURE — 99215 OFFICE O/P EST HI 40 MIN: CPT | Mod: VID

## 2023-08-22 RX ORDER — LEVOTHYROXINE SODIUM 137 UG/1
137 TABLET ORAL DAILY
Qty: 90 TABLET | Refills: 4 | Status: SHIPPED | OUTPATIENT
Start: 2023-08-22
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
## 2023-08-22 NOTE — NURSING NOTE
Is the patient currently in the state of [place]? YES    Visit mode:VIDEO    If the visit is dropped, the patient can be reconnected by: VIDEO VISIT: Text to cell phone:   Telephone Information:   Mobile [phone]       Will anyone else be joining the visit? NO  (If patient encounters technical issues they should call [phone]    How would you like to obtain your AVS? MyChart    Are changes needed to the allergy or medication list? Pt stated no changes to allergies and Pt stated no med changes    Reason for visit: RECHCORRIE ROBBINS

## 2023-08-22 NOTE — PATIENT INSTRUCTIONS
Labs in one month after the surgery (and approximately 3 weeks prior to the next appt)    After the surgery, increase the levothyroxine to 137 mcg/day. I submitted new Rx for 137 mcg tablets to your pharmacy         Neck ultrasound January 2024
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
## 2023-08-30 ENCOUNTER — ANESTHESIA (OUTPATIENT)
Dept: SURGERY | Facility: CLINIC | Age: 48
End: 2023-08-30
Payer: COMMERCIAL

## 2023-08-30 ENCOUNTER — HOSPITAL ENCOUNTER (OUTPATIENT)
Facility: CLINIC | Age: 48
Discharge: HOME | End: 2023-08-30
Attending: SURGERY | Admitting: SURGERY
Payer: COMMERCIAL

## 2023-08-30 ENCOUNTER — APPOINTMENT (OUTPATIENT)
Dept: SURGERY | Facility: PHYSICIAN GROUP | Age: 48
End: 2023-08-30
Payer: COMMERCIAL

## 2023-08-30 ENCOUNTER — ANESTHESIA EVENT (OUTPATIENT)
Dept: SURGERY | Facility: CLINIC | Age: 48
End: 2023-08-30
Payer: COMMERCIAL

## 2023-08-30 VITALS
WEIGHT: 186.3 LBS | HEIGHT: 64 IN | BODY MASS INDEX: 31.8 KG/M2 | DIASTOLIC BLOOD PRESSURE: 87 MMHG | RESPIRATION RATE: 15 BRPM | HEART RATE: 98 BPM | OXYGEN SATURATION: 94 % | SYSTOLIC BLOOD PRESSURE: 139 MMHG | TEMPERATURE: 98 F

## 2023-08-30 DIAGNOSIS — C73 FOLLICULAR CARCINOMA OF THYROID (H): Primary | ICD-10-CM

## 2023-08-30 LAB — PTH-INTACT SERPL-MCNC: 33 PG/ML (ref 15–65)

## 2023-08-30 PROCEDURE — 250N000011 HC RX IP 250 OP 636: Mod: JZ | Performed by: ANESTHESIOLOGY

## 2023-08-30 PROCEDURE — 88307 TISSUE EXAM BY PATHOLOGIST: CPT | Mod: TC | Performed by: SURGERY

## 2023-08-30 PROCEDURE — 360N000076 HC SURGERY LEVEL 3, PER MIN: Performed by: SURGERY

## 2023-08-30 PROCEDURE — 250N000011 HC RX IP 250 OP 636: Performed by: SURGERY

## 2023-08-30 PROCEDURE — 370N000017 HC ANESTHESIA TECHNICAL FEE, PER MIN: Performed by: SURGERY

## 2023-08-30 PROCEDURE — 60260 REPEAT THYROID SURGERY: CPT | Mod: AS | Performed by: PHYSICIAN ASSISTANT

## 2023-08-30 PROCEDURE — 83970 ASSAY OF PARATHORMONE: CPT | Performed by: SURGERY

## 2023-08-30 PROCEDURE — 710N000012 HC RECOVERY PHASE 2, PER MINUTE: Performed by: SURGERY

## 2023-08-30 PROCEDURE — 272N000001 HC OR GENERAL SUPPLY STERILE: Performed by: SURGERY

## 2023-08-30 PROCEDURE — 250N000011 HC RX IP 250 OP 636: Mod: JZ | Performed by: NURSE ANESTHETIST, CERTIFIED REGISTERED

## 2023-08-30 PROCEDURE — 258N000003 HC RX IP 258 OP 636: Performed by: NURSE ANESTHETIST, CERTIFIED REGISTERED

## 2023-08-30 PROCEDURE — 88307 TISSUE EXAM BY PATHOLOGIST: CPT | Mod: 26 | Performed by: PATHOLOGY

## 2023-08-30 PROCEDURE — 250N000025 HC SEVOFLURANE, PER MIN: Performed by: SURGERY

## 2023-08-30 PROCEDURE — 60260 REPEAT THYROID SURGERY: CPT | Performed by: SURGERY

## 2023-08-30 PROCEDURE — 250N000009 HC RX 250: Performed by: NURSE ANESTHETIST, CERTIFIED REGISTERED

## 2023-08-30 PROCEDURE — 250N000009 HC RX 250: Performed by: SURGERY

## 2023-08-30 PROCEDURE — 710N000009 HC RECOVERY PHASE 1, LEVEL 1, PER MIN: Performed by: SURGERY

## 2023-08-30 PROCEDURE — 999N000141 HC STATISTIC PRE-PROCEDURE NURSING ASSESSMENT: Performed by: SURGERY

## 2023-08-30 PROCEDURE — 250N000009 HC RX 250: Performed by: ANESTHESIOLOGY

## 2023-08-30 PROCEDURE — 36415 COLL VENOUS BLD VENIPUNCTURE: CPT | Performed by: SURGERY

## 2023-08-30 RX ORDER — SODIUM CHLORIDE, SODIUM LACTATE, POTASSIUM CHLORIDE, CALCIUM CHLORIDE 600; 310; 30; 20 MG/100ML; MG/100ML; MG/100ML; MG/100ML
INJECTION, SOLUTION INTRAVENOUS CONTINUOUS
Status: DISCONTINUED | OUTPATIENT
Start: 2023-08-30 | End: 2023-08-30 | Stop reason: HOSPADM

## 2023-08-30 RX ORDER — FENTANYL CITRATE 50 UG/ML
INJECTION, SOLUTION INTRAMUSCULAR; INTRAVENOUS PRN
Status: DISCONTINUED | OUTPATIENT
Start: 2023-08-30 | End: 2023-08-30

## 2023-08-30 RX ORDER — EPHEDRINE SULFATE 50 MG/ML
25 INJECTION, SOLUTION INTRAVENOUS ONCE
Status: COMPLETED | OUTPATIENT
Start: 2023-08-30 | End: 2023-08-30

## 2023-08-30 RX ORDER — ALBUTEROL SULFATE 0.83 MG/ML
2.5 SOLUTION RESPIRATORY (INHALATION) EVERY 4 HOURS PRN
Status: DISCONTINUED | OUTPATIENT
Start: 2023-08-30 | End: 2023-08-30 | Stop reason: HOSPADM

## 2023-08-30 RX ORDER — LABETALOL HYDROCHLORIDE 5 MG/ML
10 INJECTION, SOLUTION INTRAVENOUS
Status: DISCONTINUED | OUTPATIENT
Start: 2023-08-30 | End: 2023-08-30 | Stop reason: HOSPADM

## 2023-08-30 RX ORDER — IBUPROFEN 200 MG
600 TABLET ORAL EVERY 6 HOURS PRN
COMMUNITY
Start: 2023-08-30 | End: 2023-10-17

## 2023-08-30 RX ORDER — HYDROMORPHONE HCL IN WATER/PF 6 MG/30 ML
0.4 PATIENT CONTROLLED ANALGESIA SYRINGE INTRAVENOUS EVERY 5 MIN PRN
Status: DISCONTINUED | OUTPATIENT
Start: 2023-08-30 | End: 2023-08-30 | Stop reason: HOSPADM

## 2023-08-30 RX ORDER — FENTANYL CITRATE 50 UG/ML
25 INJECTION, SOLUTION INTRAMUSCULAR; INTRAVENOUS EVERY 5 MIN PRN
Status: DISCONTINUED | OUTPATIENT
Start: 2023-08-30 | End: 2023-08-30 | Stop reason: HOSPADM

## 2023-08-30 RX ORDER — FENTANYL CITRATE 50 UG/ML
50 INJECTION, SOLUTION INTRAMUSCULAR; INTRAVENOUS EVERY 5 MIN PRN
Status: DISCONTINUED | OUTPATIENT
Start: 2023-08-30 | End: 2023-08-30 | Stop reason: HOSPADM

## 2023-08-30 RX ORDER — OXYCODONE HYDROCHLORIDE 5 MG/1
10 TABLET ORAL
Status: DISCONTINUED | OUTPATIENT
Start: 2023-08-30 | End: 2023-08-30 | Stop reason: HOSPADM

## 2023-08-30 RX ORDER — CEFAZOLIN SODIUM/WATER 2 G/20 ML
2 SYRINGE (ML) INTRAVENOUS
Status: COMPLETED | OUTPATIENT
Start: 2023-08-30 | End: 2023-08-30

## 2023-08-30 RX ORDER — OXYCODONE HYDROCHLORIDE 5 MG/1
5 TABLET ORAL
Status: DISCONTINUED | OUTPATIENT
Start: 2023-08-30 | End: 2023-08-30 | Stop reason: HOSPADM

## 2023-08-30 RX ORDER — ONDANSETRON 4 MG/1
4 TABLET, ORALLY DISINTEGRATING ORAL EVERY 30 MIN PRN
Status: DISCONTINUED | OUTPATIENT
Start: 2023-08-30 | End: 2023-08-30 | Stop reason: HOSPADM

## 2023-08-30 RX ORDER — ONDANSETRON 2 MG/ML
4 INJECTION INTRAMUSCULAR; INTRAVENOUS EVERY 30 MIN PRN
Status: DISCONTINUED | OUTPATIENT
Start: 2023-08-30 | End: 2023-08-30 | Stop reason: HOSPADM

## 2023-08-30 RX ORDER — OXYCODONE HYDROCHLORIDE 5 MG/1
5-10 TABLET ORAL EVERY 4 HOURS PRN
Qty: 16 TABLET | Refills: 0 | Status: SHIPPED | OUTPATIENT
Start: 2023-08-30 | End: 2023-09-19

## 2023-08-30 RX ORDER — DEXAMETHASONE SODIUM PHOSPHATE 4 MG/ML
INJECTION, SOLUTION INTRA-ARTICULAR; INTRALESIONAL; INTRAMUSCULAR; INTRAVENOUS; SOFT TISSUE PRN
Status: DISCONTINUED | OUTPATIENT
Start: 2023-08-30 | End: 2023-08-30

## 2023-08-30 RX ORDER — SODIUM CHLORIDE, SODIUM LACTATE, POTASSIUM CHLORIDE, CALCIUM CHLORIDE 600; 310; 30; 20 MG/100ML; MG/100ML; MG/100ML; MG/100ML
INJECTION, SOLUTION INTRAVENOUS CONTINUOUS PRN
Status: DISCONTINUED | OUTPATIENT
Start: 2023-08-30 | End: 2023-08-30

## 2023-08-30 RX ORDER — ONDANSETRON 2 MG/ML
INJECTION INTRAMUSCULAR; INTRAVENOUS PRN
Status: DISCONTINUED | OUTPATIENT
Start: 2023-08-30 | End: 2023-08-30

## 2023-08-30 RX ORDER — HYDRALAZINE HYDROCHLORIDE 20 MG/ML
2.5-5 INJECTION INTRAMUSCULAR; INTRAVENOUS EVERY 10 MIN PRN
Status: DISCONTINUED | OUTPATIENT
Start: 2023-08-30 | End: 2023-08-30 | Stop reason: HOSPADM

## 2023-08-30 RX ORDER — PROPOFOL 10 MG/ML
INJECTION, EMULSION INTRAVENOUS PRN
Status: DISCONTINUED | OUTPATIENT
Start: 2023-08-30 | End: 2023-08-30

## 2023-08-30 RX ORDER — GLYCOPYRROLATE 0.2 MG/ML
INJECTION, SOLUTION INTRAMUSCULAR; INTRAVENOUS PRN
Status: DISCONTINUED | OUTPATIENT
Start: 2023-08-30 | End: 2023-08-30

## 2023-08-30 RX ORDER — LIDOCAINE HYDROCHLORIDE 20 MG/ML
INJECTION, SOLUTION INFILTRATION; PERINEURAL PRN
Status: DISCONTINUED | OUTPATIENT
Start: 2023-08-30 | End: 2023-08-30

## 2023-08-30 RX ORDER — ACETAMINOPHEN 325 MG/1
650 TABLET ORAL
Status: DISCONTINUED | OUTPATIENT
Start: 2023-08-30 | End: 2023-08-30 | Stop reason: HOSPADM

## 2023-08-30 RX ORDER — HYDROMORPHONE HCL IN WATER/PF 6 MG/30 ML
0.2 PATIENT CONTROLLED ANALGESIA SYRINGE INTRAVENOUS EVERY 5 MIN PRN
Status: DISCONTINUED | OUTPATIENT
Start: 2023-08-30 | End: 2023-08-30 | Stop reason: HOSPADM

## 2023-08-30 RX ORDER — ONDANSETRON 4 MG/1
4 TABLET, ORALLY DISINTEGRATING ORAL
Status: DISCONTINUED | OUTPATIENT
Start: 2023-08-30 | End: 2023-08-30 | Stop reason: HOSPADM

## 2023-08-30 RX ORDER — CEFAZOLIN SODIUM/WATER 2 G/20 ML
2 SYRINGE (ML) INTRAVENOUS SEE ADMIN INSTRUCTIONS
Status: DISCONTINUED | OUTPATIENT
Start: 2023-08-30 | End: 2023-08-30 | Stop reason: HOSPADM

## 2023-08-30 RX ORDER — PROMETHAZINE HYDROCHLORIDE 25 MG/ML
25 INJECTION INTRAMUSCULAR; INTRAVENOUS ONCE
Status: COMPLETED | OUTPATIENT
Start: 2023-08-30 | End: 2023-08-30

## 2023-08-30 RX ORDER — ACETAMINOPHEN 500 MG
1000 TABLET ORAL EVERY 6 HOURS PRN
COMMUNITY
Start: 2023-08-30 | End: 2023-10-17

## 2023-08-30 RX ORDER — MAGNESIUM HYDROXIDE 1200 MG/15ML
LIQUID ORAL PRN
Status: DISCONTINUED | OUTPATIENT
Start: 2023-08-30 | End: 2023-08-30 | Stop reason: HOSPADM

## 2023-08-30 RX ORDER — BUPIVACAINE HYDROCHLORIDE 5 MG/ML
INJECTION, SOLUTION PERINEURAL PRN
Status: DISCONTINUED | OUTPATIENT
Start: 2023-08-30 | End: 2023-08-30 | Stop reason: HOSPADM

## 2023-08-30 RX ADMIN — HYDROMORPHONE HYDROCHLORIDE 0.5 MG: 1 INJECTION, SOLUTION INTRAMUSCULAR; INTRAVENOUS; SUBCUTANEOUS at 08:00

## 2023-08-30 RX ADMIN — PHENYLEPHRINE HYDROCHLORIDE 50 MCG: 10 INJECTION INTRAVENOUS at 08:32

## 2023-08-30 RX ADMIN — GLYCOPYRROLATE 0.2 MG: 0.2 INJECTION, SOLUTION INTRAMUSCULAR; INTRAVENOUS at 07:47

## 2023-08-30 RX ADMIN — DEXAMETHASONE SODIUM PHOSPHATE 8 MG: 4 INJECTION, SOLUTION INTRA-ARTICULAR; INTRALESIONAL; INTRAMUSCULAR; INTRAVENOUS; SOFT TISSUE at 07:47

## 2023-08-30 RX ADMIN — EPHEDRINE SULFATE 25 MG: 50 INJECTION INTRAVENOUS at 12:09

## 2023-08-30 RX ADMIN — SODIUM CHLORIDE, POTASSIUM CHLORIDE, SODIUM LACTATE AND CALCIUM CHLORIDE: 600; 310; 30; 20 INJECTION, SOLUTION INTRAVENOUS at 07:10

## 2023-08-30 RX ADMIN — DEXMEDETOMIDINE HYDROCHLORIDE 12 MCG: 100 INJECTION, SOLUTION INTRAVENOUS at 07:59

## 2023-08-30 RX ADMIN — PHENYLEPHRINE HYDROCHLORIDE 50 MCG: 10 INJECTION INTRAVENOUS at 08:37

## 2023-08-30 RX ADMIN — PHENYLEPHRINE HYDROCHLORIDE 100 MCG: 10 INJECTION INTRAVENOUS at 09:52

## 2023-08-30 RX ADMIN — SODIUM CHLORIDE, POTASSIUM CHLORIDE, SODIUM LACTATE AND CALCIUM CHLORIDE: 600; 310; 30; 20 INJECTION, SOLUTION INTRAVENOUS at 09:21

## 2023-08-30 RX ADMIN — DEXMEDETOMIDINE HYDROCHLORIDE 0.5 MCG/KG/HR: 100 INJECTION, SOLUTION INTRAVENOUS at 08:00

## 2023-08-30 RX ADMIN — HYDROMORPHONE HYDROCHLORIDE 0.5 MG: 1 INJECTION, SOLUTION INTRAMUSCULAR; INTRAVENOUS; SUBCUTANEOUS at 09:09

## 2023-08-30 RX ADMIN — PHENYLEPHRINE HYDROCHLORIDE 50 MCG: 10 INJECTION INTRAVENOUS at 09:24

## 2023-08-30 RX ADMIN — ONDANSETRON 4 MG: 2 INJECTION INTRAMUSCULAR; INTRAVENOUS at 11:16

## 2023-08-30 RX ADMIN — FENTANYL CITRATE 100 MCG: 50 INJECTION, SOLUTION INTRAMUSCULAR; INTRAVENOUS at 07:47

## 2023-08-30 RX ADMIN — LIDOCAINE HYDROCHLORIDE 50 MG: 20 INJECTION, SOLUTION INFILTRATION; PERINEURAL at 07:47

## 2023-08-30 RX ADMIN — PHENYLEPHRINE HYDROCHLORIDE 50 MCG: 10 INJECTION INTRAVENOUS at 09:35

## 2023-08-30 RX ADMIN — ONDANSETRON 4 MG: 2 INJECTION INTRAMUSCULAR; INTRAVENOUS at 09:50

## 2023-08-30 RX ADMIN — PROPOFOL 200 MG: 10 INJECTION, EMULSION INTRAVENOUS at 07:47

## 2023-08-30 RX ADMIN — MIDAZOLAM 2 MG: 1 INJECTION INTRAMUSCULAR; INTRAVENOUS at 07:36

## 2023-08-30 RX ADMIN — PROMETHAZINE HYDROCHLORIDE 25 MG: 25 INJECTION INTRAMUSCULAR; INTRAVENOUS at 12:08

## 2023-08-30 RX ADMIN — PHENYLEPHRINE HYDROCHLORIDE 50 MCG: 10 INJECTION INTRAVENOUS at 08:06

## 2023-08-30 RX ADMIN — Medication 100 MG: at 07:47

## 2023-08-30 RX ADMIN — Medication 2 G: at 07:51

## 2023-08-30 ASSESSMENT — ACTIVITIES OF DAILY LIVING (ADL)
ADLS_ACUITY_SCORE: 36

## 2023-08-30 NOTE — ANESTHESIA CARE TRANSFER NOTE
Patient: Saloni Norton    Procedure: Procedure(s):  Completion thyroidectomy       Diagnosis: Follicular thyroid carcinoma (H) [C73]  Diagnosis Additional Information: No value filed.    Anesthesia Type:   General     Note:    Oropharynx: oral airway in place  Level of Consciousness: drowsy  Oxygen Supplementation: face mask  Level of Supplemental Oxygen (L/min / FiO2): 6  Independent Airway: airway patency satisfactory and stable  Dentition: dentition unchanged  Vital Signs Stable: post-procedure vital signs reviewed and stable  Report to RN Given: handoff report given  Patient transferred to: PACU    Handoff Report: Identifed the Patient, Identified the Reponsible Provider, Reviewed the pertinent medical history, Discussed the surgical course, Reviewed Intra-OP anesthesia mangement and issues during anesthesia, Set expectations for post-procedure period and Allowed opportunity for questions and acknowledgement of understanding      Vitals:  Vitals Value Taken Time   /73 08/30/23 1021   Temp 98.2  F (36.8  C) 08/30/23 1020   Pulse 70 08/30/23 1024   Resp 12 08/30/23 1024   SpO2 95 % 08/30/23 1024   Vitals shown include unvalidated device data.    Electronically Signed By: ESEQUIEL Mac CRNA  August 30, 2023  10:25 AM

## 2023-08-30 NOTE — PROVIDER NOTIFICATION
Called into OR 5 to report to Dr. Humphreys that pt PH resulted at 33. Verified that level was okay to discharge pt.    Ale Mitchell RN on 8/30/2023 at 11:18 AM  
33 yo F, hx of anxiety, presenting with chest pain, SOB, and anxiety. Vitals wnl. EKG without acute ischemic changes. Pleurisy vs costochondritis vs anxiety. UCG, CXR, give lorazepam. Anticipate discharge with PMD f/u. Low risk for PE or ACS. PERC negative.

## 2023-08-30 NOTE — ANESTHESIA PREPROCEDURE EVALUATION
Anesthesia Pre-Procedure Evaluation    Patient: Saloni Norton   MRN: [id number] : 1975        Procedure : Procedure(s):  Completion thyroidectomy          Past Medical History:   Diagnosis Date    Colon polyps     Diffuse cystic mastopathy     Fibrocystic breasts, asymmetric breast tissue    Follicular carcinoma of thyroid (H) 2023    History of radiation exposure     [place] age 12 [facility] HI-RISK PREG-YOUNG MULTIP 2006    Thyroid nodule     Tobacco use disorder     quit       Past Surgical History:   Procedure Laterality Date    BIOPSY  2022    thyroid    THYROIDECTOMY Left 2023    Procedure: Left thyroid lobectomy and isthmusectomy;  Surgeon: Charley Humphreys MD;  Location: [facility] OR    TONSILLECTOMY  1980    unilateral (right) tonsillectomy      No Known Allergies   Social History     Tobacco Use    Smoking status: Former     Packs/day: 0.50     Years: 10.00     Pack years: 5.00     Types: Cigarettes     Quit date: 2004     Years since quittin.2    Smokeless tobacco: Never    Tobacco comments:     total smoking about 5 pack years   Substance Use Topics    Alcohol use: Yes     Comment: less than monthly      Wt Readings from Last 1 Encounters:   23 84.5 kg (186 lb 4.8 oz)        Anesthesia Evaluation            ROS/MED HX  ENT/Pulmonary:  - neg pulmonary ROS     Neurologic:       Cardiovascular:  - neg cardiovascular ROS     METS/Exercise Tolerance:     Hematologic:       Musculoskeletal:       GI/Hepatic:       Renal/Genitourinary:       Endo:     (+)          thyroid problem (follicular carcinoma),     Obesity (BMI 32),       Psychiatric/Substance Use:       Infectious Disease:       Malignancy:       Other:            Physical Exam    Airway        Mallampati: II   TM distance: > 3 FB   Neck ROM: full     Respiratory Devices and Support         Dental           Cardiovascular   cardiovascular exam normal          Pulmonary   pulmonary exam  normal                OUTSIDE LABS:  CBC:   Lab Results   Component Value Date    WBC 7.4 08/14/2023    WBC 7.7 12/27/2022    HGB 14.5 08/14/2023    HGB 14.2 12/27/2022    HCT 43.3 08/14/2023    HCT 41.8 12/27/2022     08/14/2023     12/27/2022     BMP:   Lab Results   Component Value Date     08/14/2023     12/27/2022    POTASSIUM 4.5 08/14/2023    POTASSIUM 4.1 12/27/2022    CHLORIDE 104 08/14/2023    CHLORIDE 103 12/27/2022    CO2 26 08/14/2023    CO2 25 12/27/2022    BUN 16.0 08/14/2023    BUN 17.9 12/27/2022    CR 0.89 08/14/2023    CR 0.74 12/27/2022     (H) 08/14/2023    GLC 98 12/27/2022     COAGS: No results found for: PTT, INR, FIBR  POC:   Lab Results   Component Value Date    HCG Positive (A) 02/09/2006     HEPATIC:   Lab Results   Component Value Date    ALBUMIN 3.9 10/06/2011    PROTTOTAL 7.3 10/06/2011    ALT 9 10/06/2011    AST 16 10/06/2011    ALKPHOS 44 10/06/2011    BILITOTAL 0.4 10/06/2011     OTHER:   Lab Results   Component Value Date    A1C 5.2 06/04/2022    BRITNEY 9.1 08/14/2023    TSH 0.47 05/13/2023    T4 1.85 (H) 05/13/2023       Anesthesia Plan    ASA Status:  2    NPO Status:  NPO Appropriate    Anesthesia Type: General.     - Airway: ETT   Induction: Intravenous.   Maintenance: Balanced.        Consents    Anesthesia Plan(s) and associated risks, benefits, and realistic alternatives discussed. Questions answered and patient/representative(s) expressed understanding.     - Discussed:     - Discussed with:  Patient            Postoperative Care    Pain management: IV analgesics, Oral pain medications, Multi-modal analgesia.   PONV prophylaxis: Ondansetron (or other 5HT-3), Dexamethasone or Solumedrol     Comments:                Charley Mccoy MD

## 2023-08-30 NOTE — ANESTHESIA POSTPROCEDURE EVALUATION
Patient: Saloni Norton    Procedure: Procedure(s):  Completion thyroidectomy       Anesthesia Type:  General    Note:  Disposition: Outpatient   Postop Pain Control: Uneventful            Sign Out: Well controlled pain   PONV: No   Neuro/Psych: Uneventful            Sign Out: Acceptable/Baseline neuro status   Airway/Respiratory: Uneventful            Sign Out: Acceptable/Baseline resp. status   CV/Hemodynamics: Uneventful            Sign Out: Acceptable CV status; No obvious hypovolemia; No obvious fluid overload   Other NRE:    DID A NON-ROUTINE EVENT OCCUR? No           Last vitals:  Vitals Value Taken Time   /66 08/30/23 1030   Temp 98.2  F (36.8  C) 08/30/23 1020   Pulse 70 08/30/23 1035   Resp 15 08/30/23 1035   SpO2 95 % 08/30/23 1035   Vitals shown include unvalidated device data.    Electronically Signed By: Charley Mccoy MD  August 30, 2023  10:37 AM

## 2023-08-30 NOTE — OP NOTE
General Surgery Operative Note    PREOPERATIVE DIAGNOSIS:  Follicular thyroid carcinoma (H) [C73]    POSTOPERATIVE DIAGNOSIS:  Same    PROCEDURE:  Completion thyroidectomy (Right Thyroid Lobectomy)    ANESTHESIA:  General.    PREOPERATIVE MEDICATIONS:  Ancef 2 gm    SURGEON:  Charley Humphreys MD    ASSISTANT:  Carolyn Lagunas PA-C,  - the physician assistant was medically necessary in providing adequate exposure in the operating field, maintaining hemostasis, cutting suture, clamping and ligating blood vessels, and visualization of the anatomic structures throughout the surgical procedure.    ESTIMATED BLOOD LOSS:  30 ml    INDICATIONS:  Saloni Norton is a 48 year old female who underwent left thyroid lobectomy and isthmusectomy in January 2023 for a follicular thyroid carcinoma. There was an temporary injury to her recurrent laryngeal nerve, so we did not proceed to the contralateral lobe at the time of her initial surgery. Since then, her left vocal cord has regained full function and I have recommended that we now complete her thyroidectomy on the right so that she may have the opportunity for radioactive iodine and so that surveillance is simplified.     PROCEDURE:    Saloni Norton was placed supine on the operating room table and general endotracheal anesthesia was induced. The patient was then placed in cervical extension and the neck was prepped and draped in our usual sterile fashion. After a confirmatory pause was performed, a cervical collar incision was made sharply and then carried down through the subcutaneous tissues and platysma with cautery. Superior and inferior flaps were raised. The strap muscles were divided in the midline and the larynx and trachea were identified. This was all performed with someone increased difficulty due to scar tissue from her previous operation. The strap muscles were elevated off the right thyroid lobe. The medial aspect of the right thyroid lobe was identified  and grasped with an allis clamp.  The carotid artery was exposed. The superior pole vessels were dissected and divided with Ligasure allowing the thyroid lobe to be rotated medially.  The superior parathyroid was identified and preserved on its vascular pedicle.  The inferior parathyroid was identified on the thyroid capsule and was carefully dissected off. Deep to this, we identified the recurrent laryngeal nerve in its typical location.  Its function was confirmed using a nerve monitor. With the nerve in view, we ligated and divided the inferior pole vessels.  We followed the nerve cephalad, carfully  the thyroid away from it.  The thyroid was dissected off the trachea at the ligament of Castillo. A small cuff of thyroid was left next to the nerve in order to protect nerve function. The superior pole was marked with a suture and the specimen was passed off to pathology.  The right central neck lymph nodes were inspected and found to be grossly normal.  At conclusion of the procedure, there was excellent recurrent laryngeal nerve signal. We irrigated with saline and ensured hemostasis with valsalva.  Fibrillar hemostatic agent was placed in the operative bed   The midline fascia and platysma were closed with interrupted 3-0 vicryl suture and the skin was closed with running 4-0 subcuticular Monocryl. A dry sterile dressing was placed and the patient was transferred to recovery in good condition.      INTRAOPERATIVE FINDINGS:    1.  Grossly normal thyroid lobe  2.   Right recurrent laryngeal nerve seen and preserved.  Function confirmed by nerve monitor.  3.  Right superior and inferior parathyroid glands seen and preserved.    Specimens:   ID Type Source Tests Collected by Time Destination   1 : Right lobe of thyroid, stitch marks superior Tissue Thyroid, Right SURGICAL PATHOLOGY EXAM Charley Humphreys MD 8/30/2023  9:42 AM        Charley Humphreys MD

## 2023-08-30 NOTE — DISCHARGE INSTRUCTIONS
HOME CARE FOLLOWING THYROIDECTOMY/LOBECTOMY  BRENDEN Reinoso    Special instructions for Saloni Norton:  --Discharge medications: {DC med list - thyroid/parathyroid:380845}  {DC Calcium Taper:202429}  --Follow up appointment with Dr. Lunsford in 1-3 weeks, follow up with Endocrinologist in 4-6 weeks.     RESULTS:  Call the office regarding your final pathology report if you have not received your results after 2 full business days.     INCISIONAL CARE:  You may remove the dressing 24 hours after the drain was removed; replace the gauze if it becomes soiled.  You may expect a small amount of drainage from your previous drain site.  After removing the dressing, you may shower.  Do not submerse incision in water for 1 week.  Sutures will absorb and do not need to be removed.  Leave the steri-strips (white paper tapes) in place until they fall off, or remove at 2 weeks after surgery.  A lump/ridge under the incision is normal and will gradually resolve.    ACTIVITY:  Light Activity -- you may immediately be up and about as tolerated.  Driving -- you may drive when comfortable and off narcotic pain medications.  Light Work -- resume when comfortable off pain medications.  (If you can drive, you probably can work.)  Strenuous Work/Activity -- limit lifting to less than 10 pounds for 1 week.  Progressively increase with time.  Active Sports (running, biking, etc.) -- cautiously resume after 1 week.    DIET:  No restrictions.  Increased fluid intake is recommended. While taking pain medications, increase dietary fiber or add a fiber supplementation like Metamucil or Citrucel to help prevent constipation - a possible side effect of pain medications.    DISCOMFORT:  Use pain medications as prescribed by your surgeon.  Take the pain medication with some food, when possible, to minimize side effects.  Intermittent use of ice packs may help during the first 48 hours.  Expect gradual improvement.    CALCIUM  SUPPLEMENTATION:  Some patients are prescribed to take a calcium supplement after surgery.  Please take as prescribed.  Watch for symptoms of numbness or tingling around the mouth or in the fingers or toes.  This may be a sign of a low calcium level. If this happens, take an extra dose of your calcium supplement.  If the symptoms persist, please contact the office.  You may need to have your blood calcium level checked by doing a simple blood test.  We may also need to make adjustments in your calcium dose.  **Not all patients require calcium monitoring and supplementation post-op.  If your calcium was monitored and stabilized after surgery, the risk of having issues with low calcium once you are home is very small.    RETURN APPOINTMENT:  Schedule a follow-up visit 1-3 weeks post-op (you may do this any time after surgery is scheduled).  Office Phone:  [phone]       CONTACT US IF THE FOLLOWING DEVELOPS:   1. A fever that is above 101     2. If there is a large amount of drainage, bleeding, or swelling.   3. Severe pain that is not relieved by your prescription.   4. Drainage that is thick, cloudy, yellow, green or white.   5. Any other questions not answered by  Frequently Asked Questions  sheet.   FREQUENTLY ASKED QUESTIONS AFTER SURGERY  Gabriele Rosario, KEO Humphreys, NOAH Lopez, NOAH Christiansen, PAUL Cardoza & BRENDEN Han      Q:  How should my incision look?    A:  Normally your incision will appear slightly swollen with light redness directly along the incision itself as it heals.  It may feel like a bump or ridge as the healing/scarring happens, and over time (3-4 months) this bump or ridge feeling should slowly go away.  In general, clear or pink watery drainage can be normal at first as your incision heals, but should decrease over time.    Q:  How do I know if my incision is infected?  A:  Look at your incision for signs of infection, like redness around the incision spreading to surrounding skin, or drainage of  cloudy or foul-smelling drainage.  If you feel warm, check your temperature to see if you are running a fever.    **If any of these things occur, please notify the nurse at our office.  We may need you to come into the office for an incision check.      Q:  How do I take care of my incision?  A:  If you have a dressing in place - Starting the day after surgery, replace the dressing 1-2 times a day until there is no further drainage from the incision.  At that time, a dressing is no longer needed.  Try to minimize tape on the skin if irritation is occurring at the tape sites.  If you have significant irritation from tape on the skin, please call the office to discuss other method of dressing your incision.    Small pieces of tape called  steri-strips  may be present directly overlying your incision; these may be removed 10 days after surgery unless otherwise specified by your surgeon.  If these tapes start to loosen at the ends, you may trim them back until they fall off or are removed.    A:  If you had  Dermabond  tissue glue used as a dressing (this causes your incision to look shiny with a clear covering over it) - This type of dressing wears off with time and does not require more dressings over the top unless it is draining around the glue as it wears off.  Do not apply ointments or lotions over the incisions until the glue has completely worn off.    Q:  There is a piece of tape or a sticky  lead  still on my skin.  Can I remove this?  A:  Sometimes the sticky  leads  used for monitoring during surgery or for evaluation in the emergency department are not all removed while you are in the hospital.  These sometimes have a tab or metal dot on them.  You can easily remove these on your own, like taking off a band-aid.  If there is a gel substance under the  lead , simply wipe/clean it off with a washcloth or paper towel.      Q:  What can I do to minimize constipation (very hard stools, or lack of stools)?  A:   Stay well hydrated.  Increase your dietary fiber intake or take a fiber supplement -with plenty of water.  Walk around frequently.  You may consider an over-the-counter stool-softener.  Your Pharmacist can assist you with choosing one that is stocked at your pharmacy.  Constipation is also one of the most common side effects of pain medication.  If you are using pain medication, be pro-active and try to PREVENT problems with constipation by taking the steps above BEFORE constipation becomes a problem.    Q:  What do I do if I need more pain medications?  A:  Call the office to receive refills.  Be aware that certain pain meds cannot be called into a pharmacy and actually require a paper prescription.  A change may be made in your pain med as you progress thru your recovery period or if you have side effects to certain meds.    --Pain meds are NOT refilled after 5pm on weekdays, and NOT AT ALL on the weekends, so please look ahead to prevent problems.                  Q:  Why am I having a hard time sleeping now that I am at home?  A:  Many medications you receive while you are in the hospital can impact your sleep for a number of days after your surgery/hospitalization.  Decreased level of activity and naps during the day may also make sleeping at night difficult.  Try to minimize day-time naps, and get up frequently during the day to walk around your home during your recovery time.  Sleep aides may be of some help, but are not recommended for long-term use.      Q:  I am having some back discomfort.  What should I do?  A:  This may be related to certain positioning that was required for your surgery, extended periods of time in bed, or other changes in your overall activity level.  You may try ice, heat, acetaminophen, or ibuprofen to treat this temporarily.  Note that many pain medications have acetaminophen in them and would state this on the prescription bottle.  Be sure not to exceed the maximum of 4000mg per  day of acetaminophen.     **If the pain you are having does not resolve, is severe, or is a flare of back pain you have had on other occasions prior to surgery, please contact your primary physician for further recommendations or for an appointment to be examined at their office.    Q:  Why am I having headaches?  A:  Headaches can be caused by many things:  caffeine withdrawal, use of pain meds, dehydration, high blood pressure, lack of sleep, over-activity/exhaustion, flare-up of usual migraine headaches.  If you feel this is related to muscle tension (a band-like feeling around the head, or a pressure at the low-back of the head) you may try ice or heat to this area.  You may need to drink more fluids (try electrolyte drink like Gatorade), rest, or take your usual migraine medications.   **If your headaches do not resolve, worsen, are accompanied by other symptoms, or if your blood pressure is high, please call your primary physician for recommendation and/or examination.    Q:  I am unable to urinate.  What do I do?  A:  A small percentage of people can have difficulty urinating initially after surgery.  This includes being able to urinate only a very small amount at a time and feeling discomfort or pressure in the very low abdomen.  This is called  urinary retention , and is actually an urgent situation.  Proceed to your nearest Emergency department for evaluation (not an Urgent Care Center).  Sometimes the bladder does not work correctly after certain medications you receive during surgery, or related to certain procedures.  You may need to have a catheter placed until your bladder recovers.  When planning to go to an Emergency department, it may help to call the ER to let them know you are coming in for this problem after a surgery.  This may help you get in quicker to be evaluated.  **If you have symptoms of a urinary tract infection, please contact your primary physician for the proper evaluation and  treatment.              If you have other questions, please call the office Monday thru Friday between 8am and 5pm to discuss with the nurse or physician assistant.  #[phone]    There is a surgeon ON CALL on weekday evenings and over the weekend in case of urgent need only, and may be contacted at the same number.    If you are having an emergency, call 911 or proceed to your nearest emergency department.    GENERAL ANESTHESIA OR SEDATION ADULT DISCHARGE INSTRUCTIONS   SPECIAL PRECAUTIONS FOR 24 HOURS AFTER SURGERY    IT IS NOT UNUSUAL TO FEEL LIGHT-HEADED OR FAINT, UP TO 24 HOURS AFTER SURGERY OR WHILE TAKING PAIN MEDICATION.  IF YOU HAVE THESE SYMPTOMS; SIT FOR A FEW MINUTES BEFORE STANDING AND HAVE SOMEONE ASSIST YOU WHEN YOU GET UP TO WALK OR USE THE BATHROOM.    YOU SHOULD REST AND RELAX FOR THE NEXT 24 HOURS AND YOU MUST MAKE ARRANGEMENTS TO HAVE SOMEONE STAY WITH YOU FOR AT LEAST 24 HOURS AFTER YOUR DISCHARGE.  AVOID HAZARDOUS AND STRENUOUS ACTIVITIES.  DO NOT MAKE IMPORTANT DECISIONS FOR 24 HOURS.    DO NOT DRIVE ANY VEHICLE OR OPERATE MECHANICAL EQUIPMENT FOR 24 HOURS FOLLOWING THE END OF YOUR SURGERY.  EVEN THOUGH YOU MAY FEEL NORMAL, YOUR REACTIONS MAY BE AFFECTED BY THE MEDICATION YOU HAVE RECEIVED.    DO NOT DRINK ALCOHOLIC BEVERAGES FOR 24 HOURS FOLLOWING YOUR SURGERY.    DRINK CLEAR LIQUIDS (APPLE JUICE, GINGER ALE, 7-UP, BROTH, ETC.).  PROGRESS TO YOUR REGULAR DIET AS YOU FEEL ABLE.    YOU MAY HAVE A DRY MOUTH, A SORE THROAT, MUSCLES ACHES OR TROUBLE SLEEPING.  THESE SHOULD GO AWAY AFTER 24 HOURS.    CALL YOUR DOCTOR FOR ANY OF THE FOLLOWING:  SIGNS OF INFECTION (FEVER, GROWING TENDERNESS AT THE SURGERY SITE, A LARGE AMOUNT OF DRAINAGE OR BLEEDING, SEVERE PAIN, FOUL-SMELLING DRAINAGE, REDNESS OR SWELLING.    IT HAS BEEN OVER 8 TO 10 HOURS SINCE SURGERY AND YOU ARE STILL NOT ABLE TO URINATE (PASS WATER).     Maximum acetaminophen (Tylenol) dose from all sources should not exceed 4 grams (4000  mg) per day.

## 2023-08-31 LAB
PATH REPORT.COMMENTS IMP SPEC: NORMAL
PATH REPORT.COMMENTS IMP SPEC: NORMAL
PATH REPORT.FINAL DX SPEC: NORMAL
PATH REPORT.GROSS SPEC: NORMAL
PATH REPORT.MICROSCOPIC SPEC OTHER STN: NORMAL
PATH REPORT.RELEVANT HX SPEC: NORMAL
PHOTO IMAGE: NORMAL

## 2023-09-19 ENCOUNTER — OFFICE VISIT (OUTPATIENT)
Dept: SURGERY | Facility: CLINIC | Age: 48
End: 2023-09-19
Payer: COMMERCIAL

## 2023-09-19 VITALS
OXYGEN SATURATION: 98 % | DIASTOLIC BLOOD PRESSURE: 74 MMHG | BODY MASS INDEX: 31.76 KG/M2 | WEIGHT: 186 LBS | SYSTOLIC BLOOD PRESSURE: 122 MMHG | HEIGHT: 64 IN | HEART RATE: 76 BPM

## 2023-09-19 DIAGNOSIS — Z09 SURGICAL FOLLOWUP VISIT: Primary | ICD-10-CM

## 2023-09-19 PROCEDURE — 99024 POSTOP FOLLOW-UP VISIT: CPT | Performed by: SURGERY

## 2023-09-20 NOTE — PROGRESS NOTES
"Progress Note/Post-op Follow up:  Saloni is here for follow up after completion thyroidectomy 2.5 weeks ago.  She reports fair energy, some fatigue.  Denies numbness or tingling. Incisional discomfort is nearly resolved.    Physical Exam:  /74   Pulse 76   Ht 1.626 m (5' 4\")   Wt 84.4 kg (186 lb)   LMP 08/23/2023 (Exact Date)   SpO2 98%   BMI 31.93 kg/m    General:  Appears well, in no acute distress  Neck:  Incision site healing nicely, Healing ridge is minimal.             Range of motion is normal.  Neuro:  Voice is Normal.    Pathology:  Pathology was reviewed with the patient.   Right thyroid lobe benign thyroid tissue.    Assessment and Plan:  Saloni is doing well after completion thyroidectomy.  I recommend a follow up with Dr. Andreina Clarke as previously scheduled for follow up of her previously diagnosed follicular thyroid carcinoma. She should continue 137 mcg of levothyroxine and will have her TSH checked at her upcoming endocrinology appointment.    Charley Humphreys MD  Please route or send letter to:  Dr. Andreina Mancilla        "

## 2023-10-07 ENCOUNTER — LAB (OUTPATIENT)
Dept: LAB | Facility: CLINIC | Age: 48
End: 2023-10-07
Payer: COMMERCIAL

## 2023-10-07 DIAGNOSIS — E89.0 HISTORY OF PARTIAL THYROIDECTOMY: ICD-10-CM

## 2023-10-07 DIAGNOSIS — C73 FOLLICULAR CARCINOMA OF THYROID (H): ICD-10-CM

## 2023-10-07 PROCEDURE — 84432 ASSAY OF THYROGLOBULIN: CPT | Mod: 90

## 2023-10-07 PROCEDURE — 36415 COLL VENOUS BLD VENIPUNCTURE: CPT

## 2023-10-07 PROCEDURE — 99000 SPECIMEN HANDLING OFFICE-LAB: CPT

## 2023-10-07 PROCEDURE — 84443 ASSAY THYROID STIM HORMONE: CPT

## 2023-10-07 PROCEDURE — 84439 ASSAY OF FREE THYROXINE: CPT

## 2023-10-07 PROCEDURE — 86800 THYROGLOBULIN ANTIBODY: CPT | Mod: 90

## 2023-10-08 LAB
T4 FREE SERPL-MCNC: 1.64 NG/DL (ref 0.9–1.7)
TSH SERPL DL<=0.005 MIU/L-ACNC: 0.28 UIU/ML (ref 0.3–4.2)

## 2023-10-17 ENCOUNTER — VIRTUAL VISIT (OUTPATIENT)
Dept: ENDOCRINOLOGY | Facility: CLINIC | Age: 48
End: 2023-10-17
Payer: COMMERCIAL

## 2023-10-17 DIAGNOSIS — E89.0 POSTSURGICAL HYPOTHYROIDISM: ICD-10-CM

## 2023-10-17 DIAGNOSIS — Z78.9 TAKES DIETARY SUPPLEMENTS: ICD-10-CM

## 2023-10-17 DIAGNOSIS — R53.83 FATIGUE, UNSPECIFIED TYPE: ICD-10-CM

## 2023-10-17 DIAGNOSIS — R91.8 PULMONARY NODULES: ICD-10-CM

## 2023-10-17 DIAGNOSIS — C73 FOLLICULAR CARCINOMA OF THYROID (H): Primary | ICD-10-CM

## 2023-10-17 DIAGNOSIS — E88.819 INSULIN RESISTANCE: ICD-10-CM

## 2023-10-17 PROCEDURE — 99214 OFFICE O/P EST MOD 30 MIN: CPT | Mod: VID

## 2023-10-17 RX ORDER — VITAMIN B COMPLEX
TABLET ORAL DAILY
COMMUNITY
Start: 2023-10-17

## 2023-10-17 RX ORDER — BIOTIN 5 MG
5 TABLET ORAL DAILY
COMMUNITY
Start: 2023-10-17 | End: 2023-10-30

## 2023-10-17 NOTE — PATIENT INSTRUCTIONS
The thyroglobulin is pending.  They have been taking about 3 weeks to turn around lately.  I will write when it comes in .    We will also plan on repeating labs every 4-6 months for a while .  Next labs can be about 2-3 weeks before next appt    Neck ultrasound January 2024 (one year after first operation)    You should be OFF any biotin containing supplements one week prior to endocrine (hormone related)  lab draws.     https://[employer]/content/FDA/49-20-5876E97:39/https://www.fda.gov/medical-devices/safety-communications/update-fda-warns-biotin-may-interfere-lab-tests-fda-safety-communication

## 2023-10-17 NOTE — PROGRESS NOTES
"Endocrine Video visit note-     Attending Assessment/Plan :     1.  Follicular carcinoma, left isthmus 4 cm - \"minimally invasive including capsular and suspicious for vascular invasions\".   Treatment has  thyroidectomy in 2 operations  Unfortunately we have incomplete data today which hinders my ability to commit to details of a plan.    The decision for 131I is influenced by # 3.  Knowing the Tg would be helpful in understanding the total picture.     Addendum  10/7/234 Tg 1, PRINCESS 0.6.    RRA/adjuvant 131I  ( mCi range) might be more seriously considered for this result.    12/17/23 chart check - she has no follow up scheduled with me.  Record review shows she was seeking 2nd opinion at [facility]   2/8/24 Tg < 0.5, PRINCESS 0.5, TSH 0.08, free t4 1.83 - next appt 4/2024 2/20/24 Neck US compared with 12/14/22  Right level 4 # 1 1.1 x 0.5 x 1.6 cm   Left level 4# 1 0.6 x 0.4 x 0.6 cm   Left level 4 # 2 0.8 x 0.6 x 0.9 abnormal size, Echogenic hilum; was 0.7 x 0.8 x 1.2 cm     2.  Post surgical hypothyroidism.  On LT4 137 mcg/day since operation # 2.  Treat to target TSH < 0.4.  Current TSH at target on LT4 137 mcg/day    3.    Lung nodules up to 3 mm .    4.  On biotin. She says this started AFTEr the 10/7 blood draw.    You should be OFF any biotin containing supplements one week prior to endocrine (hormone related)  lab draws.     https://public4.[employer]/content/FDA/42-38-2730W43:39/https://www.fda.gov/medical-devices/safety-communications/update-fda-warns-biotin-may-interfere-lab-tests-fda-safety-communication    5.  Tiredness /fatigue - I do not think this is due to the LT4 dose.  Etiology Uncertain . Discussed .      Due to the continued risks of COVID 19 transmission and to improve ease of access this visit was a video visit. The patient gave verbal consent for the visit today.    I have independently reviewed and interpreted labs, imaging as indicated.     Distant Location (provider location):  " "Off-site  Mode of Communication:  Video Conference via [employer]  Chart review/prep time 1  [id number]   Visit Start time  1728  Visit Stop time 1738  36__ minutes spent on the date of the encounter doing chart review, history and exam, documentation and further activities as noted above.;austyn Clarke MD    Chief complaint/HISTORY OF PRESENT ILLNESS    Elisabet presents for follow up of follicular thyroid carcinoma s/o left lobectomy and isthmusectomy complicated by left RLN dysfunction.  She also has a few lung nodules up to 3 mm.   I last saw her 8/22/23.  Since then, she has had completion thyroidectomy and the LT4 dose increased to 137 mcg/day.  She was to get labs about 3 weeks prior to this appt but she got them drawn yesterday, so the thyroglobulin is pending       Elisabet grew up in [facility].  She was age 12 at the time of the [place] nuclear power accident when she was [place] of the radioactive plume. She remained in [facility] until age 18.    Thyroid US was first done  in [place] (the country).  She later had ultrasound followed by [facility] of 2  thyroid nodules [place] the [place].  The dominant 3.6 cm isthmus nodule cytology was AUS and  Afirma GSC suspicious and the 1.8 cm left thyroid nodule was suspicious for papillary thyroid carcinoma, Afirma Xpression atlas [id number] mutation  Treatment has been as follows:   1/4/2023 left thyroid lobectomy and isthmusectomy (Dr Charley Humphreys) .    Surgical pathology was read in both  the  system and at [facility]: Follicular carcinoma, minimally invasive, 4 cm, involving capsular and \"suspicoius for vascular invasion\".  Carcinoma was present at the anterior margin . 1/1 LN was negative for malignancy .  The path on the 2nd nodule noted preoperatively is not described specifically.  Rather, it appears to be included within what is called background benign nodular hyperplasia.   8/30/23 completion right thyroidectomy - benign  Today she says the 2nd operation went well .  Her " voice remains normal.      Endocrine relevant labs are as follows:  12/3/04 TSH 0.72, free T4 1.05  6/4/22 TSh 1.10  2/12/23 TSH 2.4  2/25/23 Tg 15.8, PRINCESS < 0.4, TSh 3.8, free t4 1.17  5/13/23 TSH 0.47, free T4 1.85  8/14/23 25OHD 31, glucose 106   8/30/23 PTH 33  10/7/23 Tg pending, TSH 0.28, free T4 1.64    Relevant imaging is as follows: (as read by me as it pertains to endocrine relevant organs)  11/23/22 thyroid US:   Right lobe thyroid small 1.0 x 1.5 x 5.5  Left nodule anterior 1.8 x 0.9 x 1.8 cm iso/very slightly hypo; thin halo- FNAB 11/23/22 - cytology suspicious for papillary thyroid carcinoma . Afirma Xpression atlas on left 1.8 cm nodule: [id number] positive. This carries cancer risk of 75%  Negative BRAF, RET/PTC1, RET/PTC3,  negative MTC and parathyroid  Isthmus nodule 3.6 x 2.4 x 3.5 cm hypoechoic grade 2-3 doppler - FNAB 11/23/22- cytology AUS; Afirma GSC suspicious; no mutations on Xpression atlas   3/17/23 CT chest: 3 mm lung nodule   6/16/23 CT chest:A few scattered pulmonary nodules including 3 mm right lower lobe nodule (series 6 image 126), not significantly changed as compared to 3/17/2023 exam    REVIEW OF SYSTEMS    Tired all the time   Sometimes don't sleep well at night; sometimes sleep too much .    Might wake up in the middle of the night and can't go back to sleep  More napping - never did this before   Hair loss isn't getting any better .   No voice or swallow symptoms.       Answers submitted by the patient for this visit:  Symptoms you have experienced in the last 30 days (Submitted on 10/10/2023)  General Symptoms: No  Skin Symptoms: No  HENT Symptoms: No  EYE SYMPTOMS: No  HEART SYMPTOMS: No  LUNG SYMPTOMS: No  INTESTINAL SYMPTOMS: No  URINARY SYMPTOMS: No  GYNECOLOGIC SYMPTOMS: No  BREAST SYMPTOMS: No  SKELETAL SYMPTOMS: No  BLOOD SYMPTOMS: No  NERVOUS SYSTEM SYMPTOMS: No  MENTAL HEALTH SYMPTOMS: No    Past Medical History  Past Medical History:   Diagnosis Date    Colon polyps      Diffuse cystic mastopathy     Fibrocystic breasts, asymmetric breast tissue    Follicular carcinoma of thyroid (H) 02/21/2023    History of radiation exposure     [place] age 12 [facility]    SUPRV HI-RISK PREG-YOUNG MULTIP 07/28/2006    Thyroid nodule     Tobacco use disorder     quit 2004     Past Surgical History:   Procedure Laterality Date    BIOPSY  11/2022    thyroid    THYROIDECTOMY Left 01/04/2023    Procedure: Left thyroid lobectomy and isthmusectomy;  Surgeon: Charley Humphreys MD;  Location: [facility] OR    THYROIDECTOMY N/A 8/30/2023    Procedure: Completion thyroidectomy;  Surgeon: Charley Humphreys MD;  Location: [facility] OR    TONSILLECTOMY  01/01/1980    unilateral (right) tonsillectomy     Medications  Current Outpatient Medications   Medication Sig Dispense Refill    Biotin 5 MG TABS Take 5 mg by mouth daily      Cyanocobalamin (B-12) 1000 MCG TBCR Take 1,000 mcg by mouth daily      metFORMIN (GLUCOPHAGE) 1000 MG tablet 1 tablet (1,000 mg) 2 times daily (with meals)      Vitamin D3 (CHOLECALCIFEROL) 25 mcg (1000 units) tablet Take by mouth daily      levothyroxine (SYNTHROID/LEVOTHROID) 137 MCG tablet Take 1 tablet (137 mcg) by mouth daily Starting after the thyroid surgery. 90 tablet 4     On OTC Fe supplementn 65 mg (325) once/day x 3 weeks  B12 1000 mcg/day  Vitamin D 1000 international unit(s)/day  Biotin 5000 mg/day.   Metformin is 1000 bid     Allergies  No Known Allergies    Family History  Family History   Problem Relation Age of Onset    Heart Disease Mother         pacemaker    Thyroid nodules Mother     Thyroid Disease Mother     Heart Disease Father     Diabetes Father     C.A.D. Father         MI age 62    Alzheimer Disease Father     Hypertension Father     Hyperlipidemia Father     Lipids Brother     Breast Cancer Maternal Grandmother         and Ailyn had post-menopausal cancer    Neurologic Disorder Paternal Grandmother         Alzheimer's    Lymphoma Cousin     Cancer - colorectal No  "family hx of     Thyroid Cancer No family hx of      Social History  Social History     Tobacco Use    Smoking status: Former     Packs/day: 0.50     Years: 10.00     Additional pack years: 0.00     Total pack years: 5.00     Types: Cigarettes     Quit date: 2004     Years since quittin.3     Passive exposure: Never    Smokeless tobacco: Never    Tobacco comments:     total smoking about 5 pack years   Vaping Use    Vaping Use: Never used   Substance Use Topics    Alcohol use: Yes     Comment: less than monthly    Drug use: No     ; Elisabet is originally From [facility] ;   Lives with , 16 year old son and mother in law; works from home -   Exercise 2-3 times/week - goes to [employer] for classes     Physical Exam  There is no height or weight on file to calculate BMI.   BP Readings from Last 1 Encounters:   23 122/74      Pulse Readings from Last 1 Encounters:   23 76      Resp Readings from Last 1 Encounters:   23 15      Temp Readings from Last 1 Encounters:   23 98  F (36.7  C) (Temporal)      SpO2 Readings from Last 1 Encounters:   23 98%      Wt Readings from Last 1 Encounters:   23 84.4 kg (186 lb)      Ht Readings from Last 1 Encounters:   23 1.626 m (5' 4\")     GENERAL: no distress; Her voice is normal  SKIN: Visible skin clear. No significant rash, abnormal pigmentation or lesions.  EYES: Eyes grossly normal to inspection.   RESP: No audible wheeze, cough, or increased work of breathing.    NEURO: Awake, alert, responds appropriately to questions.  Mentation and speech fluent.  PSYCH:affect normal and appearance well-groomed.    DATA REVIEW     Latest Ref Rng 2023  1:03 PM 2023  11:05 AM 10/7/2023  12:55 PM   [facility]       TSH 0.30 - 4.20 uIU/mL 3.80  0.47  0.28 (L)    FREE T4 0.90 - 1.70 ng/dL 1.17  1.85 (H)  1.64        Latest Ref Rng 2023  7:21 AM 2023  10:28 AM   [facility] CALCIUM [facility]      Vitamin D Deficiency " "screening 20 - 75 ug/L 31     Albumin 3.9 - 5.1 g/dL     Alkaline Phosphatase 40 - 150 U/L     Calcium 8.6 - 10.0 mg/dL 9.1     Urea Nitrogen 7 - 30 mg/dL     Urea Nitrogen 6.0 - 20.0 mg/dL 16.0     Creatinine 0.51 - 0.95 mg/dL 0.89     Parathyroid Hormone Intact Intraoperative 15 - 65 pg/mL  33        Legend:  (H) High  (L) Low  urgical Pathology Exam: [id number]  Order: [id number]  Collected 8/30/2023  9:42 AM       Status: Final result       Visible to patient: Yes (seen)    0 Result Notes       1 Patient Communication       Component  Resulting Agency   Case Report   Surgical Pathology Report                         Case: [id number]                                   Authorizing Provider:  Charley Humphreys MD    Collected:           08/30/2023 09:42 AM           Ordering Location:     [facility]   Received:            08/30/2023 10:28 AM                                  Main OR                                                                       Pathologist:           Clarke Falk MD                                                                           Specimen:    Thyroid, Right, Right lobe of thyroid, stitch marks superior                              Final Diagnosis   Thyroid, right lobe, resection-  Benign thyroid tissue   Electronically signed by Clarke Falk MD on 8/31/2023 at 12:25 PM   Clinical Information  [facility]   Procedure:  Completion thyroidectomy  Pre-op Diagnosis: Follicular thyroid carcinoma (H) [C73]  Post-op Diagnosis: C73 - Follicular thyroid carcinoma (H) [ICD-10-CM]   Gross Description  [facility]   A(1). Thyroid, Right, Right lobe of thyroid, stitch marks superior:  The specimen is received in formalin labeled with the patient's name, medical record number and other identifying information and designated \"right lobe of thyroid, stitch marks superior\". It consists of a 5.8 " g, 5.1 x 2.9 x 0.6 cm slightly distorted partial thyroidectomy specimen.  The external surface is shaggy.  No parathyroids are grossly identified.  The posterior surface is inked black, the anterior surface is inked blue, and the possible isthmus margin is inked orange.  The specimen is serially sectioned from superior to inferior to reveal a beefy red cut surface.  No masses or lesions are grossly identified.  Representative sections are submitted as follows:  A1-2: Upper pole  A3-4: Midpole  A5-A6: Lower pole to include isthmus margin   [Jayden Bolden]          EXAMINATION: US HEAD NECK SOFT TISSUE, 2/20/2024 4:27 PM      COMPARISON: Ultrasound 12/14/2022, 3/17/2023 chest CT     HISTORY: Follicular carcinoma of thyroid, history of partial  thyroidectomy     TECHNIQUE: Sonographic imaging performed of the neck to evaluate for  lymph nodes using grey scale and limited Doppler technique.     FINDINGS:     Lymph nodes are measured bilaterally with measurements given in  transverse, AP, and craniocaudal dimensions as follows:     Right:  Level 2: Hypoechoic reniform node with preserved fatty hilum measuring  1.0 x 0.8 x 1.8 cm, not significantly changed compared to previous  Level 3: No lymphadenopathy  Level 4: Hypoechoic node measuring 1.1 x 0.5 x 1.6 cm with suggestion  of a fatty hilum, similar to prior.  Level 5: No lymphadenopathy  Level 6: No lymphadenopathy  Level 7: No lymphadenopathy     Left:  Level 2: There are 2 reniform nodes with preserved fatty shravan  measuring 1.5 x 0.7 x 1.5 cm and 1.5 x 0.8 x 2.1 cm  Level 3: No lymphadenopathy  Level 4: Round hypoechoic node without appreciable fatty hilum  measuring 0.6 x 0.4 x 0.6 cm. Reniform nodule with preserved fatty  hilum measuring 0.8 x 0.6 x 0.9 cm. These correspond to the lymph node  seen on 3/17/2023 chest CT and are not significantly changed since  then.  Level 5: No lymphadenopathy  Level 6: No lymphadenopathy  Normal; no lymphadenopathy                                                                       IMPRESSION:  1.  Soft tissue neck ultrasound with lymph node measurements as  described above.  2.  0.6 cm node with round morphology at left level 4 is indeterminant  but not highly suspicious given a stable correlate on 3/17/2023 chest  CT. Recommend close attention on short-term follow-up.     I have personally reviewed the examination and initial interpretation  and I agree with the findings.     BRAIN BARKSDALE, DO

## 2023-10-17 NOTE — LETTER
"10/17/2023       RE: Saloni Norton  [id number]  [facility] [address]     Dear Colleague,    Thank you for referring your patient, Saloni Norton, to the [facility] ENDOCRINOLOG[facility] [place] [facility]. Please see a copy of my visit note below.    Endocrine Video visit note-     Attending Assessment/Plan :     1.  Follicular carcinoma, left isthmus 4 cm - \"minimally invasive including capsular and suspicious for vascular invasions\".   Treatment has  thyroidectomy in 2 operations  Unfortunately we have incomplete data today which hinders my ability to commit to details of a plan.    The decision for 131I is influenced by # 3.  Knowing the Tg would be helpful in understanding the total picture.     2.  Post surgical hypothyroidism.  On LT4 137 mcg/day since operation # 2.  Treat to target TSH < 0.4.  Current TSH at target on LT4 137 mcg/day    3.    Lung nodules up to 3 mm .    4.  On biotin. She says this started AFTEr the 10/7 blood draw.    You should be OFF any biotin containing supplements one week prior to endocrine (hormone related)  lab draws.     https://[employer]content/FDA/82-18-1038I92:39/https://www.fda.gov/medical-devices/safety-communications/update-fda-warns-biotin-may-interfere-lab-tests-fda-safety-communication    5.  Tiredness /fatigue - I do not think this is due to the LT4 dose.  Etiology Uncertain . Discussed .      Due to the continued risks of COVID 19 transmission and to improve ease of access this visit was a video visit. The patient gave verbal consent for the visit today.    I have independently reviewed and interpreted labs, imaging as indicated.     Distant Location (provider location):  Off-site  Mode of Communication:  Video Conference via [employer]  Chart review/prep time 1  [id number]   Visit Start time  1728  Visit Stop time 1738  36__ minutes spent on the date of the encounter doing chart " "review, history and exam, documentation and further activities as noted above.;austyn Clarke MD    Chief complaint/HISTORY OF PRESENT ILLNESS    Elisabet presents for follow up of follicular thyroid carcinoma s/o left lobectomy and isthmusectomy complicated by left RLN dysfunction.  She also has a few lung nodules up to 3 mm.   I last saw her 8/22/23.  Since then, she has had completion thyroidectomy and the LT4 dose increased to 137 mcg/day.  She was to get labs about 3 weeks prior to this appt but she got them drawn yesterday, so the thyroglobulin is pending       Elisabet grew up in [place] [facility].  She was age 12 at the time of the [place] nuclear power accident when she was [place] of the radioactive plume. She remained in [place] until age 18.    Thyroid US was first done  in [place] (the country[place]  She later had ultrasound followed by FNAB of 2  thyroid [place] the [place].  The dominant 3.6 cm isthmus nodule cytology was AUS and  Afirma GSC suspicious and the 1.8 cm left thyroid nodule was suspicious for papillary thyroid carcinoma, Afirma Xpression atlas [id number] mutation  Treatment has been as follows:   1/4/2023 left thyroid lobectomy and isthmusectomy (Dr Charley Humphreys) .    Surgical pathology was read in both  the  system and at [place]: Follicular carcinoma, minimally invasive, 4 cm, involving capsular and \"suspicoius for vascular invasion\".  Carcinoma was present at the anterior margin . 1/1 LN was negative for malignancy .  The path on the 2nd nodule noted preoperatively is not described specifically.  Rather, it appears to be included within what is called background benign nodular hyperplasia.   8/30/23 completion right thyroidectomy - benign  Today she says the 2nd operation went well .  Her voice remains normal.      Endocrine relevant labs are as follows:  12/3/04 TSH 0.72, free T4 1.05  6/4/22 TSh 1.10  2/12/23 TSH 2.4  2/25/23 Tg 15.8, PRINCESS < 0.4, TSh 3.8, free t4 1.17  5/13/23 TSH 0.47, free T4 " 1.85  8/14/23 25OHD 31, glucose 106   8/30/23 PTH 33  10/7/23 Tg pending, TSH 0.28, free T4 1.64    Relevant imaging is as follows: (as read by me as it pertains to endocrine relevant organs)  11/23/22 thyroid US:   Right lobe thyroid small 1.0 x 1.5 x 5.5  Left nodule anterior 1.8 x 0.9 x 1.8 cm iso/very slightly hypo; thin halo- FNAB 11/23/22 - cytology suspicious for papillary thyroid carcinoma . Afirma Xpression atlas on left 1.8 cm nodule: [id number] positive. This carries cancer risk of 75%  Negative BRAF, RET/PTC1, RET/PTC3,  negative MTC and parathyroid  Isthmus nodule 3.6 x 2.4 x 3.5 cm hypoechoic grade 2-3 doppler - FNAB 11/23/22- cytology AUS; Afirma GSC suspicious; no mutations on Xpression atlas   3/17/23 CT chest: 3 mm lung nodule   6/16/23 CT chest:A few scattered pulmonary nodules including 3 mm right lower lobe nodule (series 6 image 126), not significantly changed as compared to 3/17/2023 exam    REVIEW OF SYSTEMS    Tired all the time   Sometimes don't sleep well at night; sometimes sleep too much .    Might wake up in the middle of the night and can't go back to sleep  More napping - never did this before   Hair loss isn't getting any better .   No voice or swallow symptoms.       Answers submitted by the patient for this visit:  Symptoms you have experienced in the last 30 days (Submitted on 10/10/2023)  General Symptoms: No  Skin Symptoms: No  HENT Symptoms: No  EYE SYMPTOMS: No  HEART SYMPTOMS: No  LUNG SYMPTOMS: No  INTESTINAL SYMPTOMS: No  URINARY SYMPTOMS: No  GYNECOLOGIC SYMPTOMS: No  BREAST SYMPTOMS: No  SKELETAL SYMPTOMS: No  BLOOD SYMPTOMS: No  NERVOUS SYSTEM SYMPTOMS: No  MENTAL HEALTH SYMPTOMS: No    Past Medical History  Past Medical History:   Diagnosis Date    Colon polyps     Diffuse cystic mastopathy     Fibrocystic breasts, asymmetric breast tissue    Follicular carcinoma of thyroid (H) 02/21/2023    History of radiation exposure     [place] age 12 [facility] HI-RISK  PREG-YOUNG MULTIP 07/28/2006    Thyroid nodule     Tobacco use disorder     quit 2004     Past Surgical History:   Procedure Laterality Date    BIOPSY  11/2022    thyroid    THYROIDECTOMY Left 01/04/2023    Procedure: Left thyroid lobectomy and isthmusectomy;  Surgeon: Charley Humphreys MD;  Location: [facility] OR    THYROIDECTOMY N/A 8/30/2023    Procedure: Completion thyroidectomy;  Surgeon: Charley Humphreys MD;  Location: [facility] OR    TONSILLECTOMY  01/01/1980    unilateral (right) tonsillectomy     Medications  Current Outpatient Medications   Medication Sig Dispense Refill    Biotin 5 MG TABS Take 5 mg by mouth daily      Cyanocobalamin (B-12) 1000 MCG TBCR Take 1,000 mcg by mouth daily      metFORMIN (GLUCOPHAGE) 1000 MG tablet 1 tablet (1,000 mg) 2 times daily (with meals)      Vitamin D3 (CHOLECALCIFEROL) 25 mcg (1000 units) tablet Take by mouth daily      levothyroxine (SYNTHROID/LEVOTHROID) 137 MCG tablet Take 1 tablet (137 mcg) by mouth daily Starting after the thyroid surgery. 90 tablet 4     On OTC Fe supplementn 65 mg (325) once/day x 3 weeks  B12 1000 mcg/day  Vitamin D 1000 international unit(s)/day  Biotin 5000 mg/day.   Metformin is 1000 bid     Allergies  No Known Allergies    Family History  Family History   Problem Relation Age of Onset    Heart Disease Mother         pacemaker    Thyroid nodules Mother     Thyroid Disease Mother     Heart Disease Father     Diabetes Father     C.A.D. Father         MI age 62    Alzheimer Disease Father     Hypertension Father     Hyperlipidemia Father     Lipids Brother     Breast Cancer Maternal Grandmother         and Ailyn had post-menopausal cancer    Neurologic Disorder Paternal Grandmother         Alzheimer's    Lymphoma Cousin     Cancer - colorectal No family hx of     Thyroid Cancer No family hx of      Social History  Social History     Tobacco Use    Smoking status: Former     Packs/day: 0.50     Years: 10.00     Additional pack years: 0.00     Total  "pack years: 5.00     Types: Cigarettes     Quit date: 2004     Years since quittin.3     Passive exposure: Never    Smokeless tobacco: Never    Tobacco comments:     total smoking about 5 pack years   Vaping Use    Vaping Use: Never used   Substance Use Topics    Alcohol use: Yes     Comment: less than monthly    Drug use: No     ; Elisabet is originally From [place] ;   Lives with , 16 year old son and mother in law; works from home -   Exercise 2-3 times/week - goes to [employer] for classes     Physical Exam  There is no height or weight on file to calculate BMI.   BP Readings from Last 1 Encounters:   23 122/74      Pulse Readings from Last 1 Encounters:   23 76      Resp Readings from Last 1 Encounters:   23 15      Temp Readings from Last 1 Encounters:   23 98  F (36.7  C) (Temporal)      SpO2 Readings from Last 1 Encounters:   23 98%      Wt Readings from Last 1 Encounters:   23 84.4 kg (186 lb)      Ht Readings from Last 1 Encounters:   23 1.626 m (5' 4\")     GENERAL: no distress; Her voice is normal  SKIN: Visible skin clear. No significant rash, abnormal pigmentation or lesions.  EYES: Eyes grossly normal to inspection.   RESP: No audible wheeze, cough, or increased work of breathing.    NEURO: Awake, alert, responds appropriately to questions.  Mentation and speech fluent.  PSYCH:affect normal and appearance well-groomed.    DATA REVIEW     Latest Ref Rng 2023  1:03 PM 2023  11:05 AM 10/7/2023  12:55 PM   ENDO THYROID LABS-[facility]       TSH 0.30 - 4.20 uIU/mL 3.80  0.47  0.28 (L)    FREE T4 0.90 - 1.70 ng/dL 1.17  1.85 (H)  1.64        Latest Ref Rng 2023  7:21 AM 2023  10:28 AM   ENDO CALCIUM LABS-[facility]      Vitamin D Deficiency screening 20 - 75 ug/L 31     Albumin 3.9 - 5.1 g/dL     Alkaline Phosphatase 40 - 150 U/L     Calcium 8.6 - 10.0 mg/dL 9.1     Urea Nitrogen 7 - 30 mg/dL     Urea Nitrogen 6.0 - 20.0 mg/dL 16.0   " "  Creatinine 0.51 - 0.95 mg/dL 0.89     Parathyroid Hormone Intact Intraoperative 15 - 65 pg/mL  33        Legend:  (H) High  (L) Low  urgical Pathology Exam: [id number]  Order: [id number]  Collected 8/30/2023  9:42 AM       Status: Final result       Visible to patient: Yes (seen)    0 Result Notes       1 Patient Communication       Component  Resulting Agency   Case Report   Surgical Pathology Report                         Case: [id number]                                   Authorizing Provider:  Charley Humphreys MD    Collected:           08/30/2023 09:42 AM           Ordering Location:     [facility]   Received:            08/30/2023 10:28 AM                                  Main OR                                                                       Pathologist:           Clarke Falk MD                                                                           Specimen:    Thyroid, Right, Right lobe of thyroid, stitch marks superior                              Final Diagnosis   Thyroid, right lobe, resection-  Benign thyroid tissue   Electronically signed by Clarke Falk MD on 8/31/2023 at 12:25 PM   Clinical Information  [facility]   Procedure:  Completion thyroidectomy  Pre-op Diagnosis: Follicular thyroid carcinoma (H) [C73]  Post-op Diagnosis: C73 - Follicular thyroid carcinoma (H) [ICD-10-CM]   Gross Description  [facility]   A(1). Thyroid, Right, Right lobe of thyroid, stitch marks superior:  The specimen is received in formalin labeled with the patient's name, medical record number and other identifying information and designated \"right lobe of thyroid, stitch marks superior\". It consists of a 5.8 g, 5.1 x 2.9 x 0.6 cm slightly distorted partial thyroidectomy specimen.  The external surface is shaggy.  No parathyroids are grossly identified.  The posterior surface is inked black, the " anterior surface is inked blue, and the possible isthmus margin is inked orange.  The specimen is serially sectioned from superior to inferior to reveal a beefy red cut surface.  No masses or lesions are grossly identified.  Representative sections are submitted as follows:  A1-2: Upper pole  A3-4: Midpole  A5-A6: Lower pole to include isthmus margin   [Jayden Bolden]        Virtual Visit Details    Type of service:  Video Visit       Andreina Clarke MD

## 2023-10-17 NOTE — NURSING NOTE
Is the patient currently in the state of [place]? YES    Visit mode:VIDEO    If the visit is dropped, the patient can be reconnected by: VIDEO VISIT: Send to e-mail at: [email or web address]    Will anyone else be joining the visit? NO  (If patient encounters technical issues they should call [phone]    How would you like to obtain your AVS? MyChart    Are changes needed to the allergy or medication list? No    Reason for visit: Follow Up    Jodie ROBBINS

## 2023-10-20 LAB — SCANNED LAB RESULT: ABNORMAL

## 2023-10-26 SDOH — HEALTH STABILITY: PHYSICAL HEALTH: ON AVERAGE, HOW MANY MINUTES DO YOU ENGAGE IN EXERCISE AT THIS LEVEL?: 30 MIN

## 2023-10-26 SDOH — HEALTH STABILITY: PHYSICAL HEALTH: ON AVERAGE, HOW MANY DAYS PER WEEK DO YOU ENGAGE IN MODERATE TO STRENUOUS EXERCISE (LIKE A BRISK WALK)?: 3 DAYS

## 2023-10-26 ASSESSMENT — ENCOUNTER SYMPTOMS
PARESTHESIAS: 0
HEMATOCHEZIA: 0
WEAKNESS: 0
CHILLS: 0
HEMATURIA: 0
COUGH: 0
EYE PAIN: 0
CONSTIPATION: 0
SORE THROAT: 0
FREQUENCY: 0
FEVER: 0
MYALGIAS: 0
JOINT SWELLING: 0
ARTHRALGIAS: 0
HEADACHES: 1
ABDOMINAL PAIN: 0
NAUSEA: 0
DIARRHEA: 0
NERVOUS/ANXIOUS: 0
SHORTNESS OF BREATH: 0
PALPITATIONS: 0
HEARTBURN: 0
BREAST MASS: 0
DYSURIA: 0
DIZZINESS: 0

## 2023-10-26 ASSESSMENT — SOCIAL DETERMINANTS OF HEALTH (SDOH)
HOW OFTEN DO YOU ATTEND CHURCH OR RELIGIOUS SERVICES?: NEVER
IN A TYPICAL WEEK, HOW MANY TIMES DO YOU TALK ON THE PHONE WITH FAMILY, FRIENDS, OR NEIGHBORS?: MORE THAN THREE TIMES A WEEK
DO YOU BELONG TO ANY CLUBS OR ORGANIZATIONS SUCH AS CHURCH GROUPS UNIONS, FRATERNAL OR ATHLETIC GROUPS, OR SCHOOL GROUPS?: NO
HOW OFTEN DO YOU ATTENT MEETINGS OF THE CLUB OR ORGANIZATION YOU BELONG TO?: NEVER
HOW OFTEN DO YOU GET TOGETHER WITH FRIENDS OR RELATIVES?: ONCE A WEEK

## 2023-10-26 ASSESSMENT — LIFESTYLE VARIABLES
AUDIT-C TOTAL SCORE: 1
HOW MANY STANDARD DRINKS CONTAINING ALCOHOL DO YOU HAVE ON A TYPICAL DAY: 1 OR 2
HOW OFTEN DO YOU HAVE SIX OR MORE DRINKS ON ONE OCCASION: NEVER
HOW OFTEN DO YOU HAVE A DRINK CONTAINING ALCOHOL: MONTHLY OR LESS
SKIP TO QUESTIONS 9-10: 1

## 2023-10-30 ENCOUNTER — OFFICE VISIT (OUTPATIENT)
Dept: FAMILY MEDICINE | Facility: CLINIC | Age: 48
End: 2023-10-30
Payer: COMMERCIAL

## 2023-10-30 VITALS
BODY MASS INDEX: 31.84 KG/M2 | HEIGHT: 64 IN | DIASTOLIC BLOOD PRESSURE: 82 MMHG | WEIGHT: 186.5 LBS | TEMPERATURE: 98.6 F | RESPIRATION RATE: 16 BRPM | OXYGEN SATURATION: 97 % | SYSTOLIC BLOOD PRESSURE: 118 MMHG | HEART RATE: 78 BPM

## 2023-10-30 DIAGNOSIS — Z00.00 ROUTINE GENERAL MEDICAL EXAMINATION AT A HEALTH CARE FACILITY: Primary | ICD-10-CM

## 2023-10-30 DIAGNOSIS — Z85.850 HISTORY OF THYROID CANCER: ICD-10-CM

## 2023-10-30 DIAGNOSIS — E61.1 IRON DEFICIENCY: ICD-10-CM

## 2023-10-30 DIAGNOSIS — E89.0 S/P COMPLETE THYROIDECTOMY: ICD-10-CM

## 2023-10-30 DIAGNOSIS — C73 FOLLICULAR CARCINOMA OF THYROID (H): ICD-10-CM

## 2023-10-30 PROBLEM — Z92.3 PERSONAL HISTORY OF IRRADIATION, PRESENTING HAZARDS TO HEALTH: Status: RESOLVED | Noted: 2022-12-05 | Resolved: 2023-10-30

## 2023-10-30 PROBLEM — Z90.89 S/P COMPLETE THYROIDECTOMY: Status: ACTIVE | Noted: 2023-10-30

## 2023-10-30 PROBLEM — Z98.890 S/P COMPLETE THYROIDECTOMY: Status: ACTIVE | Noted: 2023-10-30

## 2023-10-30 PROBLEM — Z78.9 TAKES DIETARY SUPPLEMENTS: Status: RESOLVED | Noted: 2023-10-17 | Resolved: 2023-10-30

## 2023-10-30 LAB
CHOLEST SERPL-MCNC: 195 MG/DL
HBA1C MFR BLD: 5.2 % (ref 0–5.6)
HDLC SERPL-MCNC: 60 MG/DL
IRON BINDING CAPACITY (ROCHE): 306 UG/DL (ref 240–430)
IRON SATN MFR SERPL: 17 % (ref 15–46)
IRON SERPL-MCNC: 52 UG/DL (ref 37–145)
LDLC SERPL CALC-MCNC: 112 MG/DL
NONHDLC SERPL-MCNC: 135 MG/DL
TRIGL SERPL-MCNC: 117 MG/DL
VIT D+METAB SERPL-MCNC: 30 NG/ML (ref 20–50)

## 2023-10-30 PROCEDURE — 99396 PREV VISIT EST AGE 40-64: CPT | Performed by: FAMILY MEDICINE

## 2023-10-30 PROCEDURE — 83550 IRON BINDING TEST: CPT | Performed by: FAMILY MEDICINE

## 2023-10-30 PROCEDURE — 83036 HEMOGLOBIN GLYCOSYLATED A1C: CPT | Performed by: FAMILY MEDICINE

## 2023-10-30 PROCEDURE — 83540 ASSAY OF IRON: CPT | Performed by: FAMILY MEDICINE

## 2023-10-30 PROCEDURE — 80061 LIPID PANEL: CPT | Performed by: FAMILY MEDICINE

## 2023-10-30 PROCEDURE — 82306 VITAMIN D 25 HYDROXY: CPT | Performed by: FAMILY MEDICINE

## 2023-10-30 PROCEDURE — 36415 COLL VENOUS BLD VENIPUNCTURE: CPT | Performed by: FAMILY MEDICINE

## 2023-10-30 RX ORDER — LEVOTHYROXINE SODIUM 137 UG/1
137 TABLET ORAL DAILY
Qty: 90 TABLET | Refills: 4 | Status: CANCELLED | OUTPATIENT
Start: 2023-10-30

## 2023-10-30 ASSESSMENT — ENCOUNTER SYMPTOMS
FEVER: 0
DIARRHEA: 0
HEMATOCHEZIA: 0
FREQUENCY: 0
SORE THROAT: 0
COUGH: 0
CONSTIPATION: 0
BREAST MASS: 0
JOINT SWELLING: 0
NAUSEA: 0
HEARTBURN: 0
MYALGIAS: 0
HEADACHES: 1
PARESTHESIAS: 0
WEAKNESS: 0
CHILLS: 0
EYE PAIN: 0
DIZZINESS: 0
ABDOMINAL PAIN: 0
PALPITATIONS: 0
NERVOUS/ANXIOUS: 0
SHORTNESS OF BREATH: 0
DYSURIA: 0
ARTHRALGIAS: 0
HEMATURIA: 0

## 2023-10-30 ASSESSMENT — PAIN SCALES - GENERAL: PAINLEVEL: NO PAIN (0)

## 2023-11-16 ENCOUNTER — MYC MEDICAL ADVICE (OUTPATIENT)
Dept: FAMILY MEDICINE | Facility: CLINIC | Age: 48
End: 2023-11-16
Payer: COMMERCIAL

## 2023-11-16 DIAGNOSIS — C73 FOLLICULAR CARCINOMA OF THYROID (H): Primary | ICD-10-CM

## 2023-11-20 NOTE — TELEPHONE ENCOUNTER
Since [facility] is out of network for patient's insurance I will have to send to our Medical Review Team for approval.I will let patient know.    Erica SOFIA-referral coordinator

## 2023-11-21 NOTE — TELEPHONE ENCOUNTER
Referral request to [facility] has been denied by our Medical Review Team. Reason being we have care available within [facility]. Patient has been notified through [employer].    Erica SOFIA-referral coordinator

## 2023-11-22 ENCOUNTER — TELEPHONE (OUTPATIENT)
Dept: FAMILY MEDICINE | Facility: CLINIC | Age: 48
End: 2023-11-22
Payer: COMMERCIAL

## 2023-11-22 NOTE — TELEPHONE ENCOUNTER
Yes, I would send an OON to the medical review team for approval or denial.  LM for pt to call back.    Thanks,    Ruth

## 2023-11-22 NOTE — TELEPHONE ENCOUNTER
OON sent to Medial Review Team for approval or denial for [facility]. OON was    OON sent to Medial Review Team for approval or denial for [facility]. OON was   Denied. Patient notified. Ur[place] 11/21/2023    Looks like an OON was denied and pt was informed.    Thanks,  Ruth

## 2023-11-22 NOTE — TELEPHONE ENCOUNTER
Mee at [facility] calling for pre-authorization to consider approval at in network benefit. Request is to see [place] for second opinion.     I did advise her of below denial and she is aware of that but says they are trying to review for patient to provide her this service at in network benefit level     Mee asked provider go into the [employer] portal to get paperwork, I asked if they could fax information instead and gave fax number.     This should be coming vis fax today.     Bailee Brody R.N.

## 2023-11-29 ENCOUNTER — PATIENT OUTREACH (OUTPATIENT)
Dept: CARE COORDINATION | Facility: CLINIC | Age: 48
End: 2023-11-29
Payer: COMMERCIAL

## 2023-12-08 ENCOUNTER — PATIENT OUTREACH (OUTPATIENT)
Dept: GASTROENTEROLOGY | Facility: CLINIC | Age: 48
End: 2023-12-08
Payer: COMMERCIAL

## 2023-12-27 ENCOUNTER — PATIENT OUTREACH (OUTPATIENT)
Dept: CARE COORDINATION | Facility: CLINIC | Age: 48
End: 2023-12-27
Payer: COMMERCIAL

## 2024-01-24 ENCOUNTER — OFFICE VISIT (OUTPATIENT)
Dept: URGENT CARE | Facility: URGENT CARE | Age: 49
End: 2024-01-24
Payer: COMMERCIAL

## 2024-01-24 VITALS
OXYGEN SATURATION: 99 % | SYSTOLIC BLOOD PRESSURE: 118 MMHG | HEART RATE: 88 BPM | TEMPERATURE: 96.9 F | DIASTOLIC BLOOD PRESSURE: 88 MMHG | RESPIRATION RATE: 16 BRPM

## 2024-01-24 DIAGNOSIS — R05.3 PERSISTENT COUGH FOR 3 WEEKS OR LONGER: Primary | ICD-10-CM

## 2024-01-24 PROCEDURE — 99213 OFFICE O/P EST LOW 20 MIN: CPT | Performed by: PHYSICIAN ASSISTANT

## 2024-01-24 RX ORDER — AZITHROMYCIN 250 MG/1
TABLET, FILM COATED ORAL
Qty: 6 TABLET | Refills: 0 | Status: SHIPPED | OUTPATIENT
Start: 2024-01-24 | End: 2024-01-29

## 2024-01-24 RX ORDER — BENZONATATE 200 MG/1
200 CAPSULE ORAL 3 TIMES DAILY PRN
Qty: 30 CAPSULE | Refills: 0 | Status: SHIPPED | OUTPATIENT
Start: 2024-01-24 | End: 2024-03-01

## 2024-01-24 NOTE — PROGRESS NOTES
Assessment & Plan     Persistent cough for 3 weeks or longer  On exam patient is in no acute respiratory distress.  Vitals are stable.  Given length and worsening of symptoms we have elected to treat for presumed bacterial bronchitis today.  Zithromax is prescribed.  Tessalon Perles prescribed as needed for cough.  Patient educational information provided regarding course of symptoms.  Advised to keep monitoring symptoms.  Follow-up if any worsening symptoms.  Patient agrees.  - benzonatate (TESSALON) 200 MG capsule  Dispense: 30 capsule; Refill: 0  - azithromycin (ZITHROMAX) 250 MG tablet  Dispense: 6 tablet; Refill: 0       Return in about 10 days (around 2/3/2024) for Symptoms failing to improve.    Ivonne Honeycutt PA-C  [facility] YULY Bhandari is a 48 year old female who presents to clinic today for the following health issues:  Chief Complaint   Patient presents with    Cough     1 months, chest congestion-neg covid     HPI      URI Adult    Onset of symptoms was 3-4 week(s) ago.  Course of illness is worsening.    Severity moderate  Current and Associated symptoms: cough - productive  Treatment measures tried include None tried.  Predisposing factors include None.  No fever  Home Covid test negative      Review of Systems  Constitutional, HEENT, cardiovascular, pulmonary, GI, , musculoskeletal, neuro, skin, endocrine and psych systems are negative, except as otherwise noted.      Objective    /88   Pulse 88   Temp 96.9  F (36.1  C)   Resp 16   SpO2 99%   Physical Exam   GENERAL: alert and no distress  HENT: ear canals and TM's normal, mouth without ulcers or lesions, throat is moist and pink  RESP: lungs clear to auscultation - no rales, rhonchi or wheezes  CV: regular rate and rhythm, normal S1 S2  MS: no gross musculoskeletal defects noted, no edema

## 2024-02-02 ENCOUNTER — HOSPITAL ENCOUNTER (OUTPATIENT)
Dept: MAMMOGRAPHY | Facility: CLINIC | Age: 49
Discharge: HOME | End: 2024-02-02
Attending: FAMILY MEDICINE | Admitting: FAMILY MEDICINE
Payer: COMMERCIAL

## 2024-02-02 DIAGNOSIS — Z12.31 VISIT FOR SCREENING MAMMOGRAM: ICD-10-CM

## 2024-02-02 PROCEDURE — 77063 BREAST TOMOSYNTHESIS BI: CPT

## 2024-02-08 ENCOUNTER — LAB (OUTPATIENT)
Dept: LAB | Facility: CLINIC | Age: 49
End: 2024-02-08
Payer: COMMERCIAL

## 2024-02-08 DIAGNOSIS — C73 FOLLICULAR CARCINOMA OF THYROID (H): ICD-10-CM

## 2024-02-08 DIAGNOSIS — E89.0 POSTSURGICAL HYPOTHYROIDISM: ICD-10-CM

## 2024-02-08 PROCEDURE — 84443 ASSAY THYROID STIM HORMONE: CPT

## 2024-02-08 PROCEDURE — 36415 COLL VENOUS BLD VENIPUNCTURE: CPT

## 2024-02-08 PROCEDURE — 86800 THYROGLOBULIN ANTIBODY: CPT | Mod: 90

## 2024-02-08 PROCEDURE — 84439 ASSAY OF FREE THYROXINE: CPT

## 2024-02-08 PROCEDURE — 84432 ASSAY OF THYROGLOBULIN: CPT | Mod: 90

## 2024-02-08 PROCEDURE — 99000 SPECIMEN HANDLING OFFICE-LAB: CPT

## 2024-02-09 LAB
T4 FREE SERPL-MCNC: 1.83 NG/DL (ref 0.9–1.7)
TSH SERPL DL<=0.005 MIU/L-ACNC: 0.07 UIU/ML (ref 0.3–4.2)

## 2024-02-20 ENCOUNTER — ANCILLARY PROCEDURE (OUTPATIENT)
Dept: ULTRASOUND IMAGING | Facility: CLINIC | Age: 49
End: 2024-02-20
Payer: COMMERCIAL

## 2024-02-20 DIAGNOSIS — E89.0 HISTORY OF PARTIAL THYROIDECTOMY: ICD-10-CM

## 2024-02-20 DIAGNOSIS — C73 FOLLICULAR CARCINOMA OF THYROID (H): ICD-10-CM

## 2024-02-20 PROCEDURE — 76536 US EXAM OF HEAD AND NECK: CPT | Mod: GC | Performed by: RADIOLOGY

## 2024-02-21 ENCOUNTER — TELEPHONE (OUTPATIENT)
Dept: ENDOCRINOLOGY | Facility: CLINIC | Age: 49
End: 2024-02-21
Payer: COMMERCIAL

## 2024-02-21 LAB — SCANNED LAB RESULT: ABNORMAL

## 2024-03-01 ENCOUNTER — VIRTUAL VISIT (OUTPATIENT)
Dept: FAMILY MEDICINE | Facility: CLINIC | Age: 49
End: 2024-03-01
Payer: COMMERCIAL

## 2024-03-01 DIAGNOSIS — E89.0 S/P COMPLETE THYROIDECTOMY: ICD-10-CM

## 2024-03-01 DIAGNOSIS — E89.0 POSTSURGICAL HYPOTHYROIDISM: ICD-10-CM

## 2024-03-01 DIAGNOSIS — R63.5 WEIGHT GAIN: Primary | ICD-10-CM

## 2024-03-01 PROCEDURE — 99214 OFFICE O/P EST MOD 30 MIN: CPT | Mod: 95 | Performed by: FAMILY MEDICINE

## 2024-03-01 NOTE — PROGRESS NOTES
"Elisabet is a 48 year old who is being evaluated via a billable video visit.      How would you like to obtain your AVS? MyChart  If the video visit is dropped, the invitation should be resent by: Text to cell phone: [phone]  Will anyone else be joining your video visit? No          Assessment & Plan     Weight gain - recent thyroid labs indicated her weight gain is not related, will perform labs as below. Suspect related to female hormones given age.   - Follicle stimulating hormone; Future  - Cortisol; Future  - Estradiol; Future    Postsurgical hypothyroidism - she would like a second opinion regarding her thyroid labs  - Adult Endocrinology  Referral; Future    S/P complete thyroidectomy  - Adult Endocrinology  Referral; Future      Subjective   Elisabet is a 48 year old, presenting for the following health issues:  Results (Follow-up lab results)        3/1/2024     1:02 PM   Additional Questions   Roomed by Sarita Parish     History of Present Illness       Reason for visit:  Follow up    She eats 0-1 servings of fruits and vegetables daily.She consumes 0 sweetened beverage(s) daily.She exercises with enough effort to increase her heart rate 30 to 60 minutes per day.  She exercises with enough effort to increase her heart rate 3 or less days per week.   She is taking medications regularly.       Is eating pre-portioned meals, exercising several days per week. Still not able to lose weight. Met with nutrition, was told she was doing everything correct.     Following with endocrinology, not feeling like she is getting all the info she would like.   Taking her levothyroxine as prescribed.         Review of Systems  Constitutional, HEENT, cardiovascular, pulmonary, gi and gu systems are negative, except as otherwise noted.      Objective    Vitals - Patient Reported  Weight (Patient Reported): 81.6 kg (180 lb)  Height (Patient Reported): 162.6 cm (5' 4\")  BMI (Based on Pt Reported Ht/Wt): " 30.9      Vitals:  No vitals were obtained today due to virtual visit.    Physical Exam   GENERAL: alert and no distress  EYES: Eyes grossly normal to inspection.  No discharge or erythema, or obvious scleral/conjunctival abnormalities.  RESP: No audible wheeze, cough, or visible cyanosis.    SKIN: Visible skin clear. No significant rash, abnormal pigmentation or lesions.  NEURO: Cranial nerves grossly intact.  Mentation and speech appropriate for age.  PSYCH: Appropriate affect, tone, and pace of words        Video-Visit Details    Type of service:  Video Visit     Originating Location (pt. Location): Home    Distant Location (provider location):  Off-site  Platform used for Video Visit: Diego  Signed Electronically by: Marielle Mancilla MD

## 2024-03-08 ENCOUNTER — LAB (OUTPATIENT)
Dept: LAB | Facility: CLINIC | Age: 49
End: 2024-03-08
Payer: COMMERCIAL

## 2024-03-08 DIAGNOSIS — R63.5 WEIGHT GAIN: ICD-10-CM

## 2024-03-08 LAB
CORTIS SERPL-MCNC: 17.3 UG/DL
ESTRADIOL SERPL-MCNC: 253 PG/ML
FSH SERPL IRP2-ACNC: 3.2 MIU/ML

## 2024-03-08 PROCEDURE — 82670 ASSAY OF TOTAL ESTRADIOL: CPT

## 2024-03-08 PROCEDURE — 36415 COLL VENOUS BLD VENIPUNCTURE: CPT

## 2024-03-08 PROCEDURE — 83001 ASSAY OF GONADOTROPIN (FSH): CPT

## 2024-03-08 PROCEDURE — 82533 TOTAL CORTISOL: CPT

## 2024-04-29 ENCOUNTER — VIRTUAL VISIT (OUTPATIENT)
Dept: ENDOCRINOLOGY | Facility: CLINIC | Age: 49
End: 2024-04-29
Payer: COMMERCIAL

## 2024-04-29 VITALS — BODY MASS INDEX: 30.9 KG/M2 | WEIGHT: 180 LBS

## 2024-04-29 DIAGNOSIS — E89.0 POSTSURGICAL HYPOTHYROIDISM: ICD-10-CM

## 2024-04-29 DIAGNOSIS — R91.8 PULMONARY NODULES: ICD-10-CM

## 2024-04-29 DIAGNOSIS — C73 FOLLICULAR CARCINOMA OF THYROID (H): Primary | ICD-10-CM

## 2024-04-29 DIAGNOSIS — R59.0 CERVICAL ADENOPATHY: ICD-10-CM

## 2024-04-29 PROCEDURE — 99215 OFFICE O/P EST HI 40 MIN: CPT | Mod: 95

## 2024-04-29 PROCEDURE — G2211 COMPLEX E/M VISIT ADD ON: HCPCS | Mod: 95

## 2024-04-29 NOTE — NURSING NOTE
Is the patient currently in the state of [place]? YES    Visit mode:VIDEO    If the visit is dropped, the patient can be reconnected by: VIDEO VISIT: Text to cell phone:   Telephone Information:   Mobile [phone]       Will anyone else be joining the visit? NO  (If patient encounters technical issues they should call [phone])    How would you like to obtain your AVS? MyChart    Are changes needed to the allergy or medication list? No    Are refills needed on medications prescribed by this physician? NO    Reason for visit: RECHECK and Video Visit    Lisa ROBBINS

## 2024-04-29 NOTE — LETTER
"4/29/2024       RE: Saloni Norton  [id number] [facility] [address]     Dear Colleague,    Thank you for referring your patient, Saloni Norton, to the [facility] [place] at [facility]. Please see a copy of my visit note below.    Endocrine Video visit    Attending Assessment/Plan :     1.  HRAS Q61R Follicular carcinoma, left isthmus 4 cm - \"minimally invasive including capsular and suspicious for vascular invasions\".   Treatment has  thyroidectomy in 2 operations with tumor markers improving  Discussion on radioiodine - unclear if we can help her with this.   So far we have not elected to give it but I have emphasized we have the option to change this    Cervical adenopathy - abnormal left level 4 LN also seen in 2022 and stable to my eye; right level 4 LN 0.5 tall  Discussed in detail option of FNAB vs repeat US   Repeat US August 2024    2.  Post surgical hypothyroidism.  On LT4 137 mcg/day  Treat to target TSH < 0.4.      3.    Lung nodules up to 3 mm .  Repeat chest CT without contrast now     The Longitudinal plan of care for postsurgical hypothyroidism related to thyroid cancer/thyroid cancer surveillance was addressed during this visit. Due to added complexity of care, we will continue to support Saloni , and the subsequent management of this condition(s) and with the ongoing continuity of care of this condition.    Due to the continued risks of COVID 19 transmission and to improve ease of access this visit was a telephone/video visit. The patient gave verbal consent for the visit today.    I have independently reviewed and interpreted labs, imaging as indicated.     Distant Location (provider location):  Off-site  Mode of Communication:  Video Conference via [employer]  Chart review/prep time 1  [id number]  Visit Start time  1607   Visit Stop time  1623   _46_ minutes spent on the date of the encounter doing chart " "review, history and exam, documentation and further activities as noted above.      Andreina Clarke MD    Chief complaint/HISTORY OF PRESENT ILLNESS    Elisabet presents for follow up of follicular thyroid carcinoma s/o left lobectomy and isthmusectomy complicated by left RLN dysfunction.  She also has a few lung nodules up to 3 mm.   I last saw her 10/23. She had labs and US in 2/2024.  I have again reviewed all the data today , including the images.  At our last appt she was on levothyroxine 137 mcg/day and she continues on this.      Elisabet grew up in [facility] [place].  She was age 12 at the time of the [place] nuclear power accident when she was [place] of the radioactive plume. She remained in [place] until age 18.    Thyroid US was first done  in [place] (the country).  She later had ultrasound followed by FNAB of 2  thyroid [place] in the [place].  The dominant 3.6 cm isthmus nodule cytology was AUS and  Afirma GSC suspicious and the 1.8 cm left thyroid nodule was suspicious for papillary thyroid carcinoma, Afirma Xpression atlas [id number] mutation  Treatment has been as follows:   1/4/2023 left thyroid lobectomy and isthmusectomy (Dr Charley Humphreys) .    Surgical pathology was read in both  the  system and at [facility]: Follicular carcinoma, minimally invasive, 4 cm, involving capsular and \"suspicoius for vascular invasion\".  Carcinoma was present at the anterior margin . 1/1 LN was negative for malignancy .  The path on the 2nd nodule noted preoperatively is not described specifically.  Rather, it appears to be included within what is called background benign nodular hyperplasia.   8/30/23 completion right thyroidectomy - benign    Endocrine relevant labs are as follows:  lobectomy  2/25/23 Tg 15.8, PRINCESS < 0.4, TSh 3.8, free t4 1.17  5/13/23 TSH 0.47, free T4 1.85  8/14/23 25OHD 31, glucose 106   8/2023 Completion thyroidectomy  8/30/23 PTH 33  10/7/23 Tg 1, PRINCESS 0.6. , TSH 0.28, free T4 1.64  2/8/24 Tg < 0.5, PRINCESS 0.5, TSH " 0.08, free t4 1.83 -  3/8/24 0748 cortisol 17.3, estradiol 253, FSH 3.2     Relevant imaging is as follows: (as read by me as it pertains to endocrine relevant organs)  3/17/23 CT chest: 3 mm lung nodule   6/16/23 CT chest:A few scattered pulmonary nodules including 3 mm right lower lobe nodule (series 6 image 126), not significantly changed as compared to 3/17/2023 exam  2/20/24 Neck US compared with 12/14/22  Right level 4 # 1 1.1 x 0.5 x 1.6 cm   Left level 4# 1 0.6 x 0.4 x 0.6 cm   Left level 4 # 2 0.8 x 0.6 x 0.9 abnormal size, Echogenic hilum; was 0.7 x 0.8 x 1.2 cm     REVIEW OF SYSTEMS    Doing a little better  Less tired than a few months ago;   Sleep at night is good  No longer napping  Issue with weight - impossible to lose -- weight 180#   Doing a little more exercising  Cardiac: negative  Respiratory: exercise tolerance is getting better  GI: more heart burn than I used to-- taking tums which helps   Didn't like the FNAB in the past - very painful   Question about radioiodine     Past Medical History  Past Medical History:   Diagnosis Date    Colon polyps     Diffuse cystic mastopathy     Fibrocystic breasts, asymmetric breast tissue    Follicular carcinoma of thyroid (H) 02/21/2023    History of radiation exposure     [place] age 12 [facility]    SUPRV HI-RISK PREG-YOUNG MULTIP 07/28/2006    Thyroid nodule     Tobacco use disorder     quit 2004     Past Surgical History:   Procedure Laterality Date    BIOPSY  11/2022    thyroid    THYROIDECTOMY Left 01/04/2023    Procedure: Left thyroid lobectomy and isthmusectomy;  Surgeon: Charley Humphreys MD;  Location: [facility] OR    THYROIDECTOMY N/A 8/30/2023    Procedure: Completion thyroidectomy;  Surgeon: Charley Humphreys MD;  Location: [facility] OR    TONSILLECTOMY  01/01/1980    unilateral (right) tonsillectomy     Medications  Current Outpatient Medications   Medication Sig Dispense Refill    Cyanocobalamin (B-12) 1000 MCG TBCR Take 1,000 mcg by mouth daily       levothyroxine (SYNTHROID/LEVOTHROID) 137 MCG tablet Take 1 tablet (137 mcg) by mouth daily Starting after the thyroid surgery. 90 tablet 4    Vitamin D3 (CHOLECALCIFEROL) 25 mcg (1000 units) tablet Take by mouth daily       B12 1000 mcg/day  Vitamin D 1000 international unit(s)/day  Off biotin  Off metformin    Allergies  No Known Allergies    Family History  Family History   Problem Relation Age of Onset    Heart Disease Mother         pacemaker    Thyroid nodules Mother     Thyroid Disease Mother     Heart Disease Father     Diabetes Father     C.A.D. Father         MI age 62    Alzheimer Disease Father     Hypertension Father     Hyperlipidemia Father     Lipids Brother     Breast Cancer Maternal Grandmother         and Ailyn had post-menopausal cancer    Neurologic Disorder Paternal Grandmother         Alzheimer's    Lymphoma Cousin     Cancer - colorectal No family hx of     Thyroid Cancer No family hx of      Social History  Social History     Tobacco Use    Smoking status: Former     Current packs/day: 0.00     Average packs/day: 0.5 packs/day for 10.0 years (5.0 ttl pk-yrs)     Types: Cigarettes     Start date: 1994     Quit date: 2004     Years since quittin.9     Passive exposure: Never    Smokeless tobacco: Never    Tobacco comments:     total smoking about 5 pack years   Vaping Use    Vaping status: Never Used   Substance Use Topics    Alcohol use: Yes     Comment: less than monthly    Drug use: No     ; Elisabet is originally From [facility] ;   Lives with , 16 year old son and mother in law;    Physical Exam  There is no height or weight on file to calculate BMI.   BP Readings from Last 1 Encounters:   24 118/88      Pulse Readings from Last 1 Encounters:   24 88      Resp Readings from Last 1 Encounters:   24 16      Temp Readings from Last 1 Encounters:   24 96.9  F (36.1  C)      SpO2 Readings from Last 1 Encounters:   24 99%      Wt Readings from  "Last 1 Encounters:   10/30/23 84.6 kg (186 lb 8 oz)      Ht Readings from Last 1 Encounters:   10/30/23 1.626 m (5' 4\")     GENERAL: no distress; Her voice is normal  SKIN: Visible skin clear. No significant rash, abnormal pigmentation or lesions.  EYES: Eyes grossly normal to inspection.   RESP: No audible wheeze, cough, or increased work of breathing.    NEURO: Awake, alert, responds appropriately to questions.  Mentation and speech fluent.  PSYCH:affect normal and appearance well-groomed.    DATA REVIEW     Latest Ref Rng 5/13/2023  11:05 AM 10/7/2023  12:55 PM 2/8/2024  4:04 PM   ENDO THYROID LABS-[facility]       TSH 0.30 - 4.20 uIU/mL 0.47  0.28 (L)  0.07 (L)    FREE T4 0.90 - 1.70 ng/dL 1.85 (H)  1.64  1.83 (H)         EXAMINATION: US HEAD NECK SOFT TISSUE, 2/20/2024 4:27 PM   COMPARISON: Ultrasound 12/14/2022, 3/17/2023 chest CT  HISTORY: Follicular carcinoma of thyroid, history of partia tthyroidectomy  TECHNIQUE: Sonographic imaging performed of the neck to evaluate for lymph nodes using grey scale and limited Doppler technique.  FINDINGS:  Lymph nodes are measured bilaterally with measurements given in transverse, AP, and craniocaudal dimensions as follows:  Right:  Level 2: Hypoechoic reniform node with preserved fatty hilum measuring  1.0 x 0.8 x 1.8 cm, not significantly changed compared to previous  Level 3: No lymphadenopathy  Level 4: Hypoechoic node measuring 1.1 x 0.5 x 1.6 cm with suggestion  of a fatty hilum, similar to prior.  Level 5: No lymphadenopathy  Level 6: No lymphadenopathy  Level 7: No lymphadenopathy  Left:  Level 2: There are 2 reniform nodes with preserved fatty shravan  measuring 1.5 x 0.7 x 1.5 cm and 1.5 x 0.8 x 2.1 cm  Level 3: No lymphadenopathy  Level 4: Round hypoechoic node without appreciable fatty hilum measuring 0.6 x 0.4 x 0.6 cm. Reniform nodule with preserved fatty  hilum measuring 0.8 x 0.6 x 0.9 cm. These correspond to the lymph node seen on 3/17/2023 chest CT and are not " significantly changed since  then.  Level 5: No lymphadenopathy  Level 6: No lymphadenopathy  Normal; no lymphadenopathy                                                          IMPRESSION:  1.  Soft tissue neck ultrasound with lymph node measurements as described above.  2.  0.6 cm node with round morphology at left level 4 is indeterminant but not highly suspicious given a stable correlate on 3/17/2023 chest  CT. Recommend close attention on short-term follow-up.  I have personally reviewed the examination and initial interpretation and I agree with the findings.  BRAIN BARKSDALE, DO

## 2024-04-29 NOTE — PROGRESS NOTES
"Endocrine Video visit    Attending Assessment/Plan :     1.  HRAS Q61R Follicular carcinoma, left isthmus 4 cm - \"minimally invasive including capsular and suspicious for vascular invasions\".   Treatment has  thyroidectomy in 2 operations with tumor markers improving  Discussion on radioiodine - unclear if we can help her with this.   So far we have not elected to give it but I have emphasized we have the option to change this    Addendum  6/15/24 Tg < 0.5 (concurrent 0.7) , PRINCESS < 0.4 , TSH 0.42, free T4 1.6    Cervical adenopathy - abnormal left level 4 LN also seen in 2022 and stable to my eye; right level 4 LN 0.5 tall  Discussed in detail option of FNAB vs repeat US   Repeat US August 2024    Addendum  8/13/24 neck US compared with 2/20/24,  12/14/22  Right level 4 # 1 1.3 x 1.9 cm 0.5 x was 1.1 x 0.5 x 1.6 cm   Left level 4# 1 0.8 x 0.6 x 1.3 cm was 0.6 x 0.4 x 0.6 cm   Left level 4 # 2 not seen x 2 was 0.8 x 0.6 x 0.9 abnormal size, Echogenic hilum; was 0.7 x 0.8 x 1.2 cm     2.  Post surgical hypothyroidism.  On LT4 137 mcg/day  Treat to target TSH < 0.4.      3.    Lung nodules up to 3 mm .  Repeat chest CT without contrast now    Addendum  5/14/24 CT chest - no noncalcified lung nodules per radiologist.  Renal US recommended.      The Longitudinal plan of care for postsurgical hypothyroidism related to thyroid cancer/thyroid cancer surveillance was addressed during this visit. Due to added complexity of care, we will continue to support Saloni , and the subsequent management of this condition(s) and with the ongoing continuity of care of this condition.    Due to the continued risks of COVID 19 transmission and to improve ease of access this visit was a telephone/video visit. The patient gave verbal consent for the visit today.    I have independently reviewed and interpreted labs, imaging as indicated.     Distant Location (provider location):  Off-site  Mode of Communication:  Video Conference via " "[facility]  Chart review/prep time 1  [id number]  Visit Start time  1607   Visit Stop time  1623   _46_ minutes spent on the date of the encounter doing chart review, history and exam, documentation and further activities as noted above.      Andreina Clarke MD    Chief complaint/HISTORY OF PRESENT ILLNESS    Elisabet presents for follow up of follicular thyroid carcinoma s/o left lobectomy and isthmusectomy complicated by left RLN dysfunction.  She also has a few lung nodules up to 3 mm.   I last saw her 10/23. She had labs and US in 2/2024.  I have again reviewed all the data today , including the images.  At our last appt she was on levothyroxine 137 mcg/day and she continues on this.      Elisabet grew up in [facility] [place].  She was age 12 at the time of the [place] nuclear power accident when she was upstream of the radioactive plume. She remained in [place] until age 18.    Thyroid US was first done  in [place] (the country).  She later had ultrasound followed by FNAB of 2  thyroid [place] the [place].  The dominant 3.6 cm isthmus nodule cytology was AUS and  Afirma GSC suspicious and the 1.8 cm left thyroid nodule was suspicious for papillary thyroid carcinoma, Afirma Xpression atlas [id number] mutation  Treatment has been as follows:   1/4/2023 left thyroid lobectomy and isthmusectomy (Dr Charley Humphreys) .    Surgical pathology was read in both  the  system and at [facility]: Follicular carcinoma, minimally invasive, 4 cm, involving capsular and \"suspicoius for vascular invasion\".  Carcinoma was present at the anterior margin . 1/1 LN was negative for malignancy .  The path on the 2nd nodule noted preoperatively is not described specifically.  Rather, it appears to be included within what is called background benign nodular hyperplasia.   8/30/23 completion right thyroidectomy - benign    Endocrine relevant labs are as follows:  lobectomy  2/25/23 Tg 15.8, PRINCESS < 0.4, TSh 3.8, free t4 1.17  5/13/23 TSH 0.47, free T4 " 1.85  8/14/23 25OHD 31, glucose 106   8/2023 Completion thyroidectomy  8/30/23 PTH 33  10/7/23 Tg 1, PRINCESS 0.6. , TSH 0.28, free T4 1.64  2/8/24 Tg < 0.5, PRINCESS 0.5, TSH 0.08, free t4 1.83 -  3/8/24 0748 cortisol 17.3, estradiol 253, FSH 3.2     Relevant imaging is as follows: (as read by me as it pertains to endocrine relevant organs)  3/17/23 CT chest: 3 mm lung nodule   6/16/23 CT chest:A few scattered pulmonary nodules including 3 mm right lower lobe nodule (series 6 image 126), not significantly changed as compared to 3/17/2023 exam  2/20/24 Neck US compared with 12/14/22  Right level 4 # 1 1.1 x 0.5 x 1.6 cm   Left level 4# 1 0.6 x 0.4 x 0.6 cm   Left level 4 # 2 0.8 x 0.6 x 0.9 abnormal size, Echogenic hilum; was 0.7 x 0.8 x 1.2 cm     REVIEW OF SYSTEMS    Doing a little better  Less tired than a few months ago;   Sleep at night is good  No longer napping  Issue with weight - impossible to lose -- weight 180#   Doing a little more exercising  Cardiac: negative  Respiratory: exercise tolerance is getting better  GI: more heart burn than I used to-- taking tums which helps   Didn't like the FNAB in the past - very painful   Question about radioiodine     Past Medical History  Past Medical History:   Diagnosis Date    Colon polyps     Diffuse cystic mastopathy     Fibrocystic breasts, asymmetric breast tissue    Follicular carcinoma of thyroid (H) 02/21/2023    History of radiation exposure     [place] age 12 [facility] HI-RISK PREG-YOUNG MULTIP 07/28/2006    Thyroid nodule     Tobacco use disorder     quit 2004     Past Surgical History:   Procedure Laterality Date    BIOPSY  11/2022    thyroid    THYROIDECTOMY Left 01/04/2023    Procedure: Left thyroid lobectomy and isthmusectomy;  Surgeon: Charley Humphreys MD;  Location: [facility] OR    THYROIDECTOMY N/A 8/30/2023    Procedure: Completion thyroidectomy;  Surgeon: Charley Humphreys MD;  Location: [facility] OR    TONSILLECTOMY  01/01/1980    unilateral (right)  tonsillectomy     Medications  Current Outpatient Medications   Medication Sig Dispense Refill    Cyanocobalamin (B-12) 1000 MCG TBCR Take 1,000 mcg by mouth daily      levothyroxine (SYNTHROID/LEVOTHROID) 137 MCG tablet Take 1 tablet (137 mcg) by mouth daily Starting after the thyroid surgery. 90 tablet 4    Vitamin D3 (CHOLECALCIFEROL) 25 mcg (1000 units) tablet Take by mouth daily       B12 1000 mcg/day  Vitamin D 1000 international unit(s)/day  Off biotin  Off metformin    Allergies  No Known Allergies    Family History  Family History   Problem Relation Age of Onset    Heart Disease Mother         pacemaker    Thyroid nodules Mother     Thyroid Disease Mother     Heart Disease Father     Diabetes Father     C.A.D. Father         MI age 62    Alzheimer Disease Father     Hypertension Father     Hyperlipidemia Father     Lipids Brother     Breast Cancer Maternal Grandmother         and Ailyn had post-menopausal cancer    Neurologic Disorder Paternal Grandmother         Alzheimer's    Lymphoma Cousin     Cancer - colorectal No family hx of     Thyroid Cancer No family hx of      Social History  Social History     Tobacco Use    Smoking status: Former     Current packs/day: 0.00     Average packs/day: 0.5 packs/day for 10.0 years (5.0 ttl pk-yrs)     Types: Cigarettes     Start date: 1994     Quit date: 2004     Years since quittin.9     Passive exposure: Never    Smokeless tobacco: Never    Tobacco comments:     total smoking about 5 pack years   Vaping Use    Vaping status: Never Used   Substance Use Topics    Alcohol use: Yes     Comment: less than monthly    Drug use: No     ; Elisabet is originally From [facility] ;   Lives with , 16 year old son and mother in law;    Physical Exam  There is no height or weight on file to calculate BMI.   BP Readings from Last 1 Encounters:   24 118/88      Pulse Readings from Last 1 Encounters:   24 88      Resp Readings from Last 1 Encounters:  "  01/24/24 16      Temp Readings from Last 1 Encounters:   01/24/24 96.9  F (36.1  C)      SpO2 Readings from Last 1 Encounters:   01/24/24 99%      Wt Readings from Last 1 Encounters:   10/30/23 84.6 kg (186 lb 8 oz)      Ht Readings from Last 1 Encounters:   10/30/23 1.626 m (5' 4\")     GENERAL: no distress; Her voice is normal  SKIN: Visible skin clear. No significant rash, abnormal pigmentation or lesions.  EYES: Eyes grossly normal to inspection.   RESP: No audible wheeze, cough, or increased work of breathing.    NEURO: Awake, alert, responds appropriately to questions.  Mentation and speech fluent.  PSYCH:affect normal and appearance well-groomed.    DATA REVIEW     Latest Ref Rng 5/13/2023  11:05 AM 10/7/2023  12:55 PM 2/8/2024  4:04 PM   ENDO THYROID LABS-[facility]       TSH 0.30 - 4.20 uIU/mL 0.47  0.28 (L)  0.07 (L)    FREE T4 0.90 - 1.70 ng/dL 1.85 (H)  1.64  1.83 (H)         EXAMINATION: US HEAD NECK SOFT TISSUE, 2/20/2024 4:27 PM   COMPARISON: Ultrasound 12/14/2022, 3/17/2023 chest CT  HISTORY: Follicular carcinoma of thyroid, history of partia tthyroidectomy  TECHNIQUE: Sonographic imaging performed of the neck to evaluate for lymph nodes using grey scale and limited Doppler technique.  FINDINGS:  Lymph nodes are measured bilaterally with measurements given in transverse, AP, and craniocaudal dimensions as follows:  Right:  Level 2: Hypoechoic reniform node with preserved fatty hilum measuring  1.0 x 0.8 x 1.8 cm, not significantly changed compared to previous  Level 3: No lymphadenopathy  Level 4: Hypoechoic node measuring 1.1 x 0.5 x 1.6 cm with suggestion  of a fatty hilum, similar to prior.  Level 5: No lymphadenopathy  Level 6: No lymphadenopathy  Level 7: No lymphadenopathy  Left:  Level 2: There are 2 reniform nodes with preserved fatty shravan  measuring 1.5 x 0.7 x 1.5 cm and 1.5 x 0.8 x 2.1 cm  Level 3: No lymphadenopathy  Level 4: Round hypoechoic node without appreciable fatty hilum measuring 0.6 x " 0.4 x 0.6 cm. Reniform nodule with preserved fatty  hilum measuring 0.8 x 0.6 x 0.9 cm. These correspond to the lymph node seen on 3/17/2023 chest CT and are not significantly changed since  then.  Level 5: No lymphadenopathy  Level 6: No lymphadenopathy  Normal; no lymphadenopathy                                                          IMPRESSION:  1.  Soft tissue neck ultrasound with lymph node measurements as described above.  2.  0.6 cm node with round morphology at left level 4 is indeterminant but not highly suspicious given a stable correlate on 3/17/2023 chest  CT. Recommend close attention on short-term follow-up.  I have personally reviewed the examination and initial interpretation and I agree with the findings.  BRAIN BARKSDALE, DO     5/14/24 CT chest without contrast  INDICATION: Follicular carcinoma thyroid, pulmonary nodules.  Postsurgical hypothyroidism.  COMPARISON: CT chest 6/16/2023 FINDINGS: No contrast. Heart size normal. No obviously abnormal breast  tissue. Aorta and pulmonary artery calibers are normal. Common origin  of the right brachiocephalic and left common carotid arteries from theaortic arch. No pleural or pericardial effusion.  The included upper abdomen shows rounded contour at the amount of  posteriorly at the estimated mid pole level of the right kidney  unchanged which may represent fetal lobulation or other renal entity.  Consider renal ultrasound for further evaluation. No suspicious upper  abdominal findings otherwise.  Bone detail shows levocurvature of the mid to lower thoracic spine.  Degenerative spurring in the mid to lower thoracic spine as well with  prominent anterior osteophyte formation. No suspicious sclerotic or  lytic/destructive lesion.  Detail of the lungs shows punctate calcified superior segment right  lower lobe granuloma as well as punctate calcified posterior right  lung base granuloma. No noncalcified pulmonary nodules or groundglass  opacities. No  significant emphysema. Major central airways are nicely  patent.  No enlarged lymph nodes in the included lower neck.                                                        IMPRESSION: Scattered right-sided calcified small pulmonary nodules  consistent with benign granulomatous disease. No evidence of  metastatic disease in the chest. Consider right renal ultrasound to  exclude benign fetal lobulation of the right kidney versus other renal  lesion.  KAREN PHILIP MD       EXAMINATION: US HEAD NECK SOFT TISSUE, 8/13/2024 4:47 PM   COMPARISON: 2/20/2024  HISTORY: Follicular carcinoma of thyroid (H); Pulmonary nodules;  Postsurgical hypothyroidism  FINDINGS:   Lymph nodes are measured bilaterally with measurements given in  craniocaudal, transverse and AP dimensions as follows:  Right:  Level 2: 1.3 x 0.8 x 1.9 cm reniform lymph node with a fatty hilum, not significant changed.  Level 3: No lymph nodes meeting measurement criteria.  Level 4: 1.3 x 0.5 1.9 cm reniform lymph node with a fatty hilum, not significantly changed.  Level 5: No lymph nodes meeting measurement criteria.  Level 6: No lymph nodes meeting measurement criteria.  Level 7: No lymph nodes meeting measurement criteria.     Left:  Level 2: 1.8 x 0.8 x 1.9 cm and 1.7 x 0.7 x 1.9 cm reniform lymph nodes with fatty shravan, not significantly changed.  Level 3: No lymph nodes meeting measurement criteria.  Level 4: 0.8 x 0.6 x 1.3 cm reniform lymph node with a fatty hilum, not significantly changed. A small rounded lymph node is unchanged as  well.  Level 5: No lymph nodes meeting measurement criteria.  Level 6: No lymph nodes meeting measurement criteria.  Level 7: No lymph nodes meeting measurement criteria.                                                              IMPRESSION:  Soft tissue neck ultrasound with lymph node measurements as described above. No suspicious lymph nodes.  BRAIN BARKSDALE, DO

## 2024-04-30 ENCOUNTER — MYC MEDICAL ADVICE (OUTPATIENT)
Dept: FAMILY MEDICINE | Facility: CLINIC | Age: 49
End: 2024-04-30
Payer: COMMERCIAL

## 2024-04-30 DIAGNOSIS — E66.811 CLASS 1 OBESITY WITH SERIOUS COMORBIDITY AND BODY MASS INDEX (BMI) OF 31.0 TO 31.9 IN ADULT, UNSPECIFIED OBESITY TYPE: Primary | ICD-10-CM

## 2024-04-30 NOTE — TELEPHONE ENCOUNTER
Never did since she checked with insurance and they wouldn't pay.     Curious to see how this plays out. Referral made.

## 2024-05-01 NOTE — TELEPHONE ENCOUNTER
Dr. Mancilla,    If you do not agree with patient's request you do not have to place a referral.  I certainly do not need to submit a referral to patient's insurance.  Your decision, please let me know.    Madhuri MELENDEZ Referrals

## 2024-05-07 ENCOUNTER — TELEPHONE (OUTPATIENT)
Dept: FAMILY MEDICINE | Facility: CLINIC | Age: 49
End: 2024-05-07
Payer: COMMERCIAL

## 2024-05-07 DIAGNOSIS — E66.811 CLASS 1 OBESITY WITH SERIOUS COMORBIDITY AND BODY MASS INDEX (BMI) OF 31.0 TO 31.9 IN ADULT, UNSPECIFIED OBESITY TYPE: Primary | ICD-10-CM

## 2024-05-07 NOTE — TELEPHONE ENCOUNTER
To Specialty To Provider To Location/Place of Service To Department   none none [facility] ([facility]) none       The providers name specifically needs to be entered into the referral to [facility] per [facility]    Provider - Charlene Lopez   Location- [facility]    Please update referral so we can fax to patient     Deeptistefano Barbosalain/    r

## 2024-05-08 ENCOUNTER — TELEPHONE (OUTPATIENT)
Dept: ENDOCRINOLOGY | Facility: CLINIC | Age: 49
End: 2024-05-08
Payer: COMMERCIAL

## 2024-05-08 NOTE — TELEPHONE ENCOUNTER
Patient confirmed scheduled appointment:  Date: 12/9/24 - james okay per provider checkout comments  Time: 4:40 pm  Visit type: return endo  Provider: Dr. Clarke  Location: virtual  Testing/imaging: CT scheduled for 5/14/24 and labs 6/15/24  Additional notes: Patient said they will schedule US and Dec 2024 labs on their own-  scheduling numbers provided    Check Out Comments: Labs in June and also 2-3 weeks before next appt Chest CT without contrast now US August 2024 OK to use return JAMES

## 2024-05-14 ENCOUNTER — ANCILLARY PROCEDURE (OUTPATIENT)
Dept: CT IMAGING | Facility: CLINIC | Age: 49
End: 2024-05-14
Payer: COMMERCIAL

## 2024-05-14 DIAGNOSIS — C73 FOLLICULAR CARCINOMA OF THYROID (H): ICD-10-CM

## 2024-05-14 DIAGNOSIS — R91.8 PULMONARY NODULES: ICD-10-CM

## 2024-05-14 DIAGNOSIS — E89.0 POSTSURGICAL HYPOTHYROIDISM: ICD-10-CM

## 2024-05-14 PROCEDURE — 71250 CT THORAX DX C-: CPT | Performed by: RADIOLOGY

## 2024-05-15 DIAGNOSIS — R93.421 ABNORMAL FINDING ON DIAGNOSTIC IMAGING OF RIGHT KIDNEY: Primary | ICD-10-CM

## 2024-05-21 ENCOUNTER — ANCILLARY PROCEDURE (OUTPATIENT)
Dept: ULTRASOUND IMAGING | Facility: CLINIC | Age: 49
End: 2024-05-21
Payer: COMMERCIAL

## 2024-05-21 DIAGNOSIS — R93.421 ABNORMAL FINDING ON DIAGNOSTIC IMAGING OF RIGHT KIDNEY: ICD-10-CM

## 2024-05-21 PROCEDURE — 76770 US EXAM ABDO BACK WALL COMP: CPT | Mod: GC | Performed by: RADIOLOGY

## 2024-06-15 ENCOUNTER — LAB (OUTPATIENT)
Dept: LAB | Facility: CLINIC | Age: 49
End: 2024-06-15
Payer: COMMERCIAL

## 2024-06-15 DIAGNOSIS — R91.8 PULMONARY NODULES: ICD-10-CM

## 2024-06-15 DIAGNOSIS — C73 FOLLICULAR CARCINOMA OF THYROID (H): ICD-10-CM

## 2024-06-15 DIAGNOSIS — E89.0 POSTSURGICAL HYPOTHYROIDISM: ICD-10-CM

## 2024-06-15 LAB
T4 FREE SERPL-MCNC: 1.6 NG/DL (ref 0.9–1.7)
TSH SERPL DL<=0.005 MIU/L-ACNC: 0.42 UIU/ML (ref 0.3–4.2)

## 2024-06-15 PROCEDURE — 36415 COLL VENOUS BLD VENIPUNCTURE: CPT

## 2024-06-15 PROCEDURE — 84432 ASSAY OF THYROGLOBULIN: CPT | Mod: 90

## 2024-06-15 PROCEDURE — 86800 THYROGLOBULIN ANTIBODY: CPT | Mod: 90

## 2024-06-15 PROCEDURE — 84439 ASSAY OF FREE THYROXINE: CPT

## 2024-06-15 PROCEDURE — 84443 ASSAY THYROID STIM HORMONE: CPT

## 2024-06-15 PROCEDURE — 99000 SPECIMEN HANDLING OFFICE-LAB: CPT

## 2024-06-26 LAB — SCANNED LAB RESULT: NORMAL

## 2024-08-05 ENCOUNTER — OFFICE VISIT (OUTPATIENT)
Dept: URGENT CARE | Facility: URGENT CARE | Age: 49
End: 2024-08-05
Payer: COMMERCIAL

## 2024-08-05 VITALS
WEIGHT: 186 LBS | HEART RATE: 92 BPM | RESPIRATION RATE: 14 BRPM | BODY MASS INDEX: 31.93 KG/M2 | SYSTOLIC BLOOD PRESSURE: 118 MMHG | OXYGEN SATURATION: 96 % | TEMPERATURE: 98.4 F | DIASTOLIC BLOOD PRESSURE: 74 MMHG

## 2024-08-05 DIAGNOSIS — H60.312 ACUTE DIFFUSE OTITIS EXTERNA OF LEFT EAR: Primary | ICD-10-CM

## 2024-08-05 PROCEDURE — 99213 OFFICE O/P EST LOW 20 MIN: CPT | Performed by: PHYSICIAN ASSISTANT

## 2024-08-05 RX ORDER — NEOMYCIN SULFATE, POLYMYXIN B SULFATE, HYDROCORTISONE 3.5; 10000; 1 MG/ML; [USP'U]/ML; MG/ML
3 SOLUTION/ DROPS AURICULAR (OTIC) 3 TIMES DAILY
Qty: 10 ML | Refills: 0 | Status: SHIPPED | OUTPATIENT
Start: 2024-08-05 | End: 2024-08-12

## 2024-08-05 NOTE — PATIENT INSTRUCTIONS
Patient was educated on the natural course of condition. Otitis externa occurs when the ear canal becomes inflamed. Several factors can increase your risk of developing this including swimming, wearing earbuds or hearing aids, and using q tips. Apply medication as prescribed. Conservative measures discussed including avoid using q-tips and over-the-counter analgesics (Tylenol or Ibuprofen). See your primary care provider if symptoms worsen or do not improve in 3 days. Seek emergency care if you develop severe ear pain, swelling, or redness.

## 2024-08-05 NOTE — PROGRESS NOTES
URGENT CARE VISIT:    SUBJECTIVE:   Saloni Norton is a 49 year old female presenting with a chief complaint of ear pain left.  Onset was today.  She denies the following symptoms: fever, chills, stuffy nose, and cough - non-productive  Course of illness is worsening.    Treatment measures tried include None tried with no relief of symptoms.  Predisposing factors include None.    PMH:   Past Medical History:   Diagnosis Date    Colon polyps     Diffuse cystic mastopathy     Fibrocystic breasts, asymmetric breast tissue    Follicular carcinoma of thyroid (H) 2023    History of radiation exposure     [place] age 12 [facility] HI-RISK PREG-YOUNG MULTIP 2006    Thyroid nodule     Tobacco use disorder     quit      Allergies: Patient has no known allergies.   Medications:   Current Outpatient Medications   Medication Sig Dispense Refill    Cyanocobalamin (B-12) 1000 MCG TBCR Take 1,000 mcg by mouth daily      levothyroxine (SYNTHROID/LEVOTHROID) 137 MCG tablet Take 1 tablet (137 mcg) by mouth daily Starting after the thyroid surgery. 90 tablet 4    neomycin-polymyxin-hydrocortisone (CORTISPORIN) 3.5-71210-4 otic solution Place 3 drops Into the left ear 3 times daily for 7 days 10 mL 0    Vitamin D3 (CHOLECALCIFEROL) 25 mcg (1000 units) tablet Take by mouth daily       Social History:   Social History     Tobacco Use    Smoking status: Former     Current packs/day: 0.00     Average packs/day: 0.5 packs/day for 10.0 years (5.0 ttl pk-yrs)     Types: Cigarettes     Start date: 1994     Quit date: 2004     Years since quittin.1     Passive exposure: Never    Smokeless tobacco: Never    Tobacco comments:     total smoking about 5 pack years   Substance Use Topics    Alcohol use: Yes     Comment: less than monthly       ROS:  Review of systems negative except as stated above.    OBJECTIVE:  /74 (BP Location: Right arm, Patient Position: Chair, Cuff Size: Adult Regular)   Pulse 92    Temp 98.4  F (36.9  C) (Oral)   Resp 14   Wt 84.4 kg (186 lb)   LMP 07/21/2024 (Exact Date)   SpO2 96%   BMI 31.93 kg/m    GENERAL APPEARANCE: healthy, alert and no distress  EYES: EOMI,  PERRL, conjunctiva clear  HENT: right ear canal and TM's normal.  Left marked tragus TTP. Left ear canal is edematous and mildly erythematous. Nose and mouth without ulcers, erythema or lesions  NECK: supple, nontender, no lymphadenopathy  RESP: lungs clear to auscultation - no rales, rhonchi or wheezes  CV: regular rates and rhythm, normal S1 S2, no murmur noted  SKIN: no suspicious lesions or rashes      ASSESSMENT:    ICD-10-CM    1. Acute diffuse otitis externa of left ear  H60.312 neomycin-polymyxin-hydrocortisone (CORTISPORIN) 3.5-20143-2 otic solution          PLAN:  Patient Instructions   Patient was educated on the natural course of condition. Otitis externa occurs when the ear canal becomes inflamed. Several factors can increase your risk of developing this including swimming, wearing earbuds or hearing aids, and using q tips. Apply medication as prescribed. Conservative measures discussed including avoid using q-tips and over-the-counter analgesics (Tylenol or Ibuprofen). See your primary care provider if symptoms worsen or do not improve in 3 days. Seek emergency care if you develop severe ear pain, swelling, or redness.    Patient verbalized understanding and is agreeable to plan. The patient was discharged ambulatory and in stable condition.    Melissa Taylor PA-C ....................  8/5/2024   6:52 PM

## 2024-08-06 ENCOUNTER — OFFICE VISIT (OUTPATIENT)
Dept: URGENT CARE | Facility: URGENT CARE | Age: 49
End: 2024-08-06
Payer: COMMERCIAL

## 2024-08-06 ENCOUNTER — TELEPHONE (OUTPATIENT)
Dept: NURSING | Facility: CLINIC | Age: 49
End: 2024-08-06
Payer: COMMERCIAL

## 2024-08-06 VITALS
TEMPERATURE: 98.4 F | DIASTOLIC BLOOD PRESSURE: 72 MMHG | SYSTOLIC BLOOD PRESSURE: 120 MMHG | WEIGHT: 186 LBS | OXYGEN SATURATION: 99 % | RESPIRATION RATE: 14 BRPM | BODY MASS INDEX: 31.93 KG/M2 | HEART RATE: 93 BPM

## 2024-08-06 DIAGNOSIS — H60.392 OTHER INFECTIVE ACUTE OTITIS EXTERNA OF LEFT EAR: Primary | ICD-10-CM

## 2024-08-06 PROCEDURE — 99213 OFFICE O/P EST LOW 20 MIN: CPT | Performed by: PHYSICIAN ASSISTANT

## 2024-08-07 NOTE — TELEPHONE ENCOUNTER
Patient calling. She was seen yesterday at urgent care for ear pain. She has an infection in her left ear. She states that her ear is in more pain, and the pain is now going down her neck, and her teeth don't meet on the left now.     Patient instructions included directions to seek emergency dare if she develops severe ear pain, which she endorses when asked.     Patient was encouraged to follow the previously given care advice. Patient states that she is driving back to urgent care to have it re-examined. Patient was told that urgent care closes in 15 minutes, and was again encouraged to go to the emergency department. Patient then asked if this writer could get her oral antibiotics, and was told that she would need to be re-examined. Patient ended the call. No triage.     Venita Muse RN  [facility]  August 6, 2024, 7:46 PM

## 2024-08-07 NOTE — PROGRESS NOTES
Assessment & Plan     Other infective acute otitis externa of left ear  Acute problem.  Worsening symptoms despite starting Cortisporin eardrops yesterday.  Augmentin oral antibiotic is added today.  We have also put an ear wick in the left ear canal to promote good topical otic antibiotic distribution.  We discussed the ear wick will fall out on its own once the swelling in the ear canal resolves.  Recommend close monitoring of symptoms.  We discussed follow-up in the ER if severe pain, facial redness, fevers or chills.  Patient agrees with the plan.  - amoxicillin-clavulanate (AUGMENTIN) 875-125 MG tablet  Dispense: 20 tablet; Refill: 0       Return in about 2 days (around 8/8/2024) for Symptoms failing to improve.    Ivonne Honeycutt PA-C  [facility] [place]    Angélica Bhandari is a 49 year old female who presents to clinic today for the following health issues:  Chief Complaint   Patient presents with    Ear Problem     Left ear pain x 2 days -- ear drops were given but did not work   - Aleve at 3pm today       HPI    Patient is presenting to urgent care today to recheck on left ear otitis externa. Patient was diagnosed with left otitis externa yesterday, prescribed Cortisporin eardrops.  She notes her ear pain is worsening.  She notes the pain is radiating down her neck, and she notes her jaw is not completely closing on the left side due to pain.  She otherwise denies fevers or chills.      Review of Systems  Constitutional, HEENT, cardiovascular, pulmonary, GI, , musculoskeletal, neuro, skin, endocrine and psych systems are negative, except as otherwise noted.      Objective    /72 (BP Location: Right arm, Patient Position: Chair, Cuff Size: Adult Regular)   Pulse 93   Temp 98.4  F (36.9  C) (Oral)   Resp 14   Wt 84.4 kg (186 lb)   LMP 07/21/2024 (Exact Date)   SpO2 99%   BMI 31.93 kg/m    Physical Exam   GENERAL: alert and no distress  HENT: right ear canal and right  TM's normal, left ear canal is edematous and erythematous, unable to visualize the left TM, the left ear canal is swollen shut, no acute drainage, there is tenderness to palpation with traction of the left tragus  RESP:  normal breathing effort

## 2024-08-13 ENCOUNTER — ANCILLARY PROCEDURE (OUTPATIENT)
Dept: ULTRASOUND IMAGING | Facility: CLINIC | Age: 49
End: 2024-08-13
Payer: COMMERCIAL

## 2024-08-13 DIAGNOSIS — C73 FOLLICULAR CARCINOMA OF THYROID (H): ICD-10-CM

## 2024-08-13 DIAGNOSIS — R91.8 PULMONARY NODULES: ICD-10-CM

## 2024-08-13 DIAGNOSIS — E89.0 POSTSURGICAL HYPOTHYROIDISM: ICD-10-CM

## 2024-08-13 PROCEDURE — 76536 US EXAM OF HEAD AND NECK: CPT | Performed by: RADIOLOGY

## 2024-08-21 ENCOUNTER — MYC MEDICAL ADVICE (OUTPATIENT)
Dept: FAMILY MEDICINE | Facility: CLINIC | Age: 49
End: 2024-08-21
Payer: COMMERCIAL

## 2024-08-21 ENCOUNTER — NURSE TRIAGE (OUTPATIENT)
Dept: FAMILY MEDICINE | Facility: CLINIC | Age: 49
End: 2024-08-21
Payer: COMMERCIAL

## 2024-08-21 NOTE — TELEPHONE ENCOUNTER
Writer reviewed UC notes, who indicates that if patient is not better to follow up with PCP. Writer called and scheduled patient into POA slot as Dr. Mancilla is out of office today and Dr. Mancilla is patient's PCP.

## 2024-08-21 NOTE — TELEPHONE ENCOUNTER
Nurse Triage SBAR    Is this a 2nd Level Triage? NO    Situation: patient continues to have L ear pain     Background: was seen in  on 8/5 and 8/6 for concern, was given both otic drops and oral antibiotic.     Assessment: Patient scratched L inner ear with nail and developed an infection. Patient denies fever. Patient reports that she is still having intermittent ear pain, mostly at night and it wakes patient up. Denies drainage, swelling or redness. Patient leaves town on Tuesday and would like symptoms to improve before then.     Protocol Recommended Disposition:   See in Office Today or Tomorrow    Recommendation: patient is not in town today or tomorrow, she is wondering if PCP would be able to see patient on Friday. PCP has POA available at 11 AM, patient verbalized she could make any appointment work.     Routing to PCP, please advise if patient can be evaluated on Friday.      Routed to provider    Does the patient meet one of the following criteria for ADS visit consideration? 16+ years old, with an [facility] PCP     TIP  Providers, please consider if this condition is appropriate for management at one of our Acute and Diagnostic Services sites.     If patient is a good candidate, please use dotphrase <dot>triageresponse and select Refer to ADS to document.  Reason for Disposition   All other earaches  (Exceptions: Earache lasting < 1 hour, and earache from air travel.)    Additional Information   Negative: Sounds like a life-threatening emergency to the triager   Negative: Pink or red swelling behind the ear   Negative: Stiff neck (can't touch chin to chest)   Negative: Patient sounds very sick or weak to the triager   Negative: Severe earache pain   Negative: Fever > 103 F (39.4 C)   Negative: Pointed object was inserted into the ear canal (e.g., a pencil, stick, or wire)   Negative: White, yellow, or green discharge   Negative: Diabetes mellitus or a weak immune system (e.g., HIV positive, cancer  chemotherapy, transplant patient)   Negative: Bloody discharge or unexplained bleeding from ear canal   Negative: New blurred vision or vision changes    Protocols used: Earache-A-OH

## 2024-08-22 NOTE — TELEPHONE ENCOUNTER
Writer called patient yesterday about message in in basket sent today. She has upcoming appt.  Appointments in Next Year      Aug 23, 2024 11:00 AM  (Arrive by 10:40 AM)  Provider Visit with Marielle Mancilla MD  [facility] ([facility] ) [phone]

## 2024-08-23 ENCOUNTER — OFFICE VISIT (OUTPATIENT)
Dept: FAMILY MEDICINE | Facility: CLINIC | Age: 49
End: 2024-08-23
Payer: COMMERCIAL

## 2024-08-23 VITALS
DIASTOLIC BLOOD PRESSURE: 76 MMHG | SYSTOLIC BLOOD PRESSURE: 125 MMHG | BODY MASS INDEX: 31.92 KG/M2 | TEMPERATURE: 99.1 F | OXYGEN SATURATION: 97 % | HEART RATE: 70 BPM | WEIGHT: 187 LBS | HEIGHT: 64 IN | RESPIRATION RATE: 16 BRPM

## 2024-08-23 DIAGNOSIS — H61.22 IMPACTED CERUMEN OF LEFT EAR: Primary | ICD-10-CM

## 2024-08-23 PROCEDURE — 69209 REMOVE IMPACTED EAR WAX UNI: CPT | Mod: LT | Performed by: FAMILY MEDICINE

## 2024-08-23 RX ORDER — MULTIVITAMIN WITH IRON
TABLET ORAL
COMMUNITY
Start: 2024-08-01

## 2024-08-23 RX ORDER — PROGESTERONE 100 MG/1
CAPSULE ORAL
COMMUNITY
Start: 2024-05-10

## 2024-08-23 NOTE — PROGRESS NOTES
"  Assessment & Plan     Impacted cerumen of left ear - flushed today, despite this, she still had a white layer of fluid in the EAC. Advised she treat with topical abx she already has at home.   - REMOVE IMPACTED CERUMEN    BMI  Estimated body mass index is 32.1 kg/m  as calculated from the following:    Height as of this encounter: 1.626 m (5' 4\").    Weight as of this encounter: 84.8 kg (187 lb).         Angélica Bhandari is a 49 year old, presenting for the following health issues:  Ear Problem (Ear recheck of left ear)        8/23/2024    10:42 AM   Additional Questions   Roomed by Sarita Parish     History of Present Illness       Reason for visit:  Ear check   She is taking medications regularly.       Review of Systems  Constitutional, HEENT, cardiovascular, pulmonary, gi and gu systems are negative, except as otherwise noted.      Objective    /76 (BP Location: Right arm, Patient Position: Chair, Cuff Size: Adult Large)   Pulse 70   Temp 99.1  F (37.3  C) (Oral)   Resp 16   Ht 1.626 m (5' 4\")   Wt 84.8 kg (187 lb)   LMP 08/15/2024 (Exact Date)   SpO2 97%   BMI 32.10 kg/m    Body mass index is 32.1 kg/m .  Physical Exam   GENERAL: alert and no distress  HENT: thin layer of while scale/effusion around left EAC periphery,, no erythema or swelling          Signed Electronically by: Marielle Mancilla MD    "

## 2024-09-20 ENCOUNTER — OFFICE VISIT (OUTPATIENT)
Dept: URGENT CARE | Facility: URGENT CARE | Age: 49
End: 2024-09-20
Payer: COMMERCIAL

## 2024-09-20 VITALS
DIASTOLIC BLOOD PRESSURE: 80 MMHG | BODY MASS INDEX: 32.1 KG/M2 | TEMPERATURE: 98.2 F | RESPIRATION RATE: 14 BRPM | WEIGHT: 187 LBS | OXYGEN SATURATION: 98 % | HEART RATE: 82 BPM | SYSTOLIC BLOOD PRESSURE: 118 MMHG

## 2024-09-20 DIAGNOSIS — H60.391 INFECTIVE OTITIS EXTERNA, RIGHT: Primary | ICD-10-CM

## 2024-09-20 PROCEDURE — 99213 OFFICE O/P EST LOW 20 MIN: CPT | Performed by: PHYSICIAN ASSISTANT

## 2024-09-20 RX ORDER — CIPROFLOXACIN AND DEXAMETHASONE 3; 1 MG/ML; MG/ML
4 SUSPENSION/ DROPS AURICULAR (OTIC) 2 TIMES DAILY
Qty: 7.5 ML | Refills: 0 | Status: SHIPPED | OUTPATIENT
Start: 2024-09-20

## 2024-09-20 NOTE — PROGRESS NOTES
Assessment & Plan:        ICD-10-CM    1. Infective otitis externa, right  H60.391 ciprofloxacin-dexAMETHasone (CIPRODEX) 0.3-0.1 % otic suspension     amoxicillin-clavulanate (AUGMENTIN) 875-125 MG tablet            Plan/Clinical Decision Making:    Patient with acute right ear pain for 3 days. On exam has severe swelling of canal, no drainage, erythema and tenderness.   Start on Ciprodex and Augmentin.       Return if symptoms worsen or fail to improve, for in 2-3 days.     At the end of the encounter, I discussed results, diagnosis, medications. Discussed red flags for immediate return to clinic/ER, as well as indications for follow up if no improvement. Patient understood and agreed to plan. Patient was stable for discharge.        Laisha Rick PA-C on 9/20/2024 at 12:48 PM          Subjective:     HPI:    Elisabet is a 49 year old female who presents to clinic today for the following health issues:  Chief Complaint   Patient presents with    Ear Problem     Right Ear pain x 3 days --- tried some drops -- HX of left ear infection and used some old drops     HPI    Patient with 3 days of right ear pain. Tried some drops. Having so much swelling, hard to put drops into ear.     Review of Systems   Constitutional:  Negative for fever.   HENT:  Positive for ear pain. Negative for congestion, rhinorrhea and sore throat.    Respiratory:  Negative for cough.          Patient Active Problem List   Diagnosis    Class 1 obesity with serious comorbidity and body mass index (BMI) of 31.0 to 31.9 in adult, unspecified obesity type    Colon polyp    Former smoker    Postsurgical hypothyroidism    Follicular carcinoma of thyroid (H)    Pulmonary nodules    Fatigue, unspecified type    History of thyroid cancer    S/P complete thyroidectomy    Cervical adenopathy        Past Medical History:   Diagnosis Date    Colon polyps     Diffuse cystic mastopathy     Fibrocystic breasts, asymmetric breast tissue    Follicular carcinoma  of thyroid (H) 2023    History of radiation exposure     [place] age 12 [facility]    SUPRV HI-RISK PREG-YOUNG MULTIP 2006    Thyroid nodule     Tobacco use disorder     quit        Social History     Tobacco Use    Smoking status: Former     Current packs/day: 0.00     Average packs/day: 0.5 packs/day for 10.0 years (5.0 ttl pk-yrs)     Types: Cigarettes     Start date: 1994     Quit date: 2004     Years since quittin.3     Passive exposure: Never    Smokeless tobacco: Never    Tobacco comments:     total smoking about 5 pack years   Substance Use Topics    Alcohol use: Yes     Comment: less than monthly             Objective:     Vitals:    24 1221   BP: 118/80   BP Location: Right arm   Patient Position: Chair   Cuff Size: Adult Regular   Pulse: 82   Resp: 14   Temp: 98.2  F (36.8  C)   TempSrc: Oral   SpO2: 98%   Weight: 84.8 kg (187 lb)         Physical Exam   EXAM:   Pleasant, alert, appropriate appearance. NAD.  Head Exam: Normocephalic, atraumatic.  Eye Exam:  non icteric/injection.    Ear Exam: Left TM grey without bulging. Right TM: pain pulling on ear, canal erythematous and swelling, Unable to see TM due to swelling.   Nose Exam: Normal external nose.    OroPharynx Exam:  Moist mucous membranes. No erythema, pharynx without exudate or hypertrophy.  Neck/Thyroid Exam:  No LAD.    Chest/Respiratory Exam: CTAB.      Results:  No results found for any visits on 24.

## 2024-09-22 ASSESSMENT — ENCOUNTER SYMPTOMS
SORE THROAT: 0
COUGH: 0
RHINORRHEA: 0
FEVER: 0

## 2024-10-20 DIAGNOSIS — E89.0 HISTORY OF PARTIAL THYROIDECTOMY: ICD-10-CM

## 2024-10-20 DIAGNOSIS — C73 FOLLICULAR CARCINOMA OF THYROID (H): ICD-10-CM

## 2024-10-21 ENCOUNTER — HOSPITAL ENCOUNTER (EMERGENCY)
Facility: CLINIC | Age: 49
Discharge: HOME | End: 2024-10-21
Attending: EMERGENCY MEDICINE | Admitting: EMERGENCY MEDICINE
Payer: COMMERCIAL

## 2024-10-21 VITALS
OXYGEN SATURATION: 96 % | BODY MASS INDEX: 33.2 KG/M2 | HEIGHT: 64 IN | DIASTOLIC BLOOD PRESSURE: 104 MMHG | HEART RATE: 76 BPM | WEIGHT: 194.45 LBS | TEMPERATURE: 98.4 F | SYSTOLIC BLOOD PRESSURE: 146 MMHG | RESPIRATION RATE: 18 BRPM

## 2024-10-21 DIAGNOSIS — R03.0 ELEVATED BLOOD PRESSURE READING: ICD-10-CM

## 2024-10-21 LAB
ANION GAP SERPL CALCULATED.3IONS-SCNC: 12 MMOL/L (ref 7–15)
BASOPHILS # BLD AUTO: 0 10E3/UL (ref 0–0.2)
BASOPHILS NFR BLD AUTO: 1 %
BUN SERPL-MCNC: 21.8 MG/DL (ref 6–20)
CALCIUM SERPL-MCNC: 8.8 MG/DL (ref 8.8–10.4)
CHLORIDE SERPL-SCNC: 101 MMOL/L (ref 98–107)
CREAT SERPL-MCNC: 1.09 MG/DL (ref 0.51–0.95)
EGFRCR SERPLBLD CKD-EPI 2021: 62 ML/MIN/1.73M2
EOSINOPHIL # BLD AUTO: 0.1 10E3/UL (ref 0–0.7)
EOSINOPHIL NFR BLD AUTO: 1 %
ERYTHROCYTE [DISTWIDTH] IN BLOOD BY AUTOMATED COUNT: 13.8 % (ref 10–15)
GLUCOSE SERPL-MCNC: 102 MG/DL (ref 70–99)
HCO3 SERPL-SCNC: 24 MMOL/L (ref 22–29)
HCT VFR BLD AUTO: 44.4 % (ref 35–47)
HGB BLD-MCNC: 14.6 G/DL (ref 11.7–15.7)
HOLD SPECIMEN: NORMAL
HOLD SPECIMEN: NORMAL
IMM GRANULOCYTES # BLD: 0 10E3/UL
IMM GRANULOCYTES NFR BLD: 0 %
LYMPHOCYTES # BLD AUTO: 2.7 10E3/UL (ref 0.8–5.3)
LYMPHOCYTES NFR BLD AUTO: 30 %
MCH RBC QN AUTO: 27.4 PG (ref 26.5–33)
MCHC RBC AUTO-ENTMCNC: 32.9 G/DL (ref 31.5–36.5)
MCV RBC AUTO: 84 FL (ref 78–100)
MONOCYTES # BLD AUTO: 0.6 10E3/UL (ref 0–1.3)
MONOCYTES NFR BLD AUTO: 6 %
NEUTROPHILS # BLD AUTO: 5.5 10E3/UL (ref 1.6–8.3)
NEUTROPHILS NFR BLD AUTO: 62 %
NRBC # BLD AUTO: 0 10E3/UL
NRBC BLD AUTO-RTO: 0 /100
PLATELET # BLD AUTO: 183 10E3/UL (ref 150–450)
POTASSIUM SERPL-SCNC: 4.1 MMOL/L (ref 3.4–5.3)
RBC # BLD AUTO: 5.32 10E6/UL (ref 3.8–5.2)
SODIUM SERPL-SCNC: 137 MMOL/L (ref 135–145)
TROPONIN T SERPL HS-MCNC: <6 NG/L
WBC # BLD AUTO: 8.9 10E3/UL (ref 4–11)

## 2024-10-21 PROCEDURE — 36415 COLL VENOUS BLD VENIPUNCTURE: CPT | Performed by: EMERGENCY MEDICINE

## 2024-10-21 PROCEDURE — 93005 ELECTROCARDIOGRAM TRACING: CPT

## 2024-10-21 PROCEDURE — 80048 BASIC METABOLIC PNL TOTAL CA: CPT | Performed by: EMERGENCY MEDICINE

## 2024-10-21 PROCEDURE — 84484 ASSAY OF TROPONIN QUANT: CPT | Performed by: EMERGENCY MEDICINE

## 2024-10-21 PROCEDURE — 85004 AUTOMATED DIFF WBC COUNT: CPT | Performed by: EMERGENCY MEDICINE

## 2024-10-21 PROCEDURE — 99284 EMERGENCY DEPT VISIT MOD MDM: CPT

## 2024-10-21 RX ORDER — AMLODIPINE BESYLATE 2.5 MG/1
2.5 TABLET ORAL DAILY
Qty: 14 TABLET | Refills: 0 | Status: SHIPPED | OUTPATIENT
Start: 2024-10-21 | End: 2024-11-04

## 2024-10-21 ASSESSMENT — COLUMBIA-SUICIDE SEVERITY RATING SCALE - C-SSRS
6. HAVE YOU EVER DONE ANYTHING, STARTED TO DO ANYTHING, OR PREPARED TO DO ANYTHING TO END YOUR LIFE?: NO
2. HAVE YOU ACTUALLY HAD ANY THOUGHTS OF KILLING YOURSELF IN THE PAST MONTH?: NO
1. IN THE PAST MONTH, HAVE YOU WISHED YOU WERE DEAD OR WISHED YOU COULD GO TO SLEEP AND NOT WAKE UP?: NO

## 2024-10-21 ASSESSMENT — ACTIVITIES OF DAILY LIVING (ADL)
ADLS_ACUITY_SCORE: 36
ADLS_ACUITY_SCORE: 36

## 2024-10-21 NOTE — ED PROVIDER NOTES
"    History     Chief Complaint:  HTN       HPI   Saloni Norton is a 49 year old female who presents for evaluation of elevated blood pressures.  The patient notes that she has no history of hypertension.  However, over the last week, she has had intermittent mild headaches and occasional chest pressure, prompting her to check her blood pressures.  During this time, she has had blood pressures in the 140s systolic and 100s diastolic at greatest.  She notes that she has been asymptomatic today but notes general fatigue in the last week.  She denies abdominal pain, shortness of breath, vision changes, weakness, speech changes, or other concerns.      Independent Historian:   None    Review of External Notes:   Reviewed 9/20/2024 office visit for review of past medical history    ROS:  Review of Systems    Allergies:  No Known Allergies     Medications:    amLODIPine (NORVASC) 2.5 MG tablet  ciprofloxacin-dexAMETHasone (CIPRODEX) 0.3-0.1 % otic suspension  Cyanocobalamin (B-12) 1000 MCG TBCR  levothyroxine (SYNTHROID/LEVOTHROID) 137 MCG tablet  magnesium 250 MG tablet  progesterone (PROMETRIUM) 100 MG capsule  Vitamin D3 (CHOLECALCIFEROL) 25 mcg (1000 units) tablet        Past History:    Past Medical History:   Diagnosis Date    Colon polyps     Diffuse cystic mastopathy     Follicular carcinoma of thyroid (H) 02/21/2023    History of radiation exposure     SUPRV HI-RISK PREG-YOUNG MULTIP 07/28/2006    Thyroid nodule     Tobacco use disorder          Physical Exam     Patient Vitals for the past 24 hrs:  Vitals:    10/21/24 1715 10/21/24 1856   BP: (!) 163/123 (!) 146/104   Pulse: 80 76   Resp: 18 18   Temp: 98.4  F (36.9  C)    TempSrc: Temporal    SpO2: 97% 96%   Weight: 88.2 kg (194 lb 7.1 oz)    Height: 1.626 m (5' 4\")          Physical Exam  Constitutional: Alert, attentive  HENT:    Nose: Nose normal.    Mouth/Throat: Oropharynx is clear, mucous membranes are moist   Eyes: EOM are normal.   CV: regular rate " and rhythm; no murmurs, rubs or gallups  Chest: Effort normal and breath sounds normal.   GI:  There is no tenderness. No distension. Normal bowel sounds  MSK: Normal range of motion.   Neurological: Alert, attentive   Normal gait   Normal sensation   Normal strength  Skin: Skin is warm and dry.      Emergency Department Course     Results for orders placed or performed during the hospital encounter of 10/21/24   Basic metabolic panel     Status: Abnormal   Result Value Ref Range    Sodium 137 135 - 145 mmol/L    Potassium 4.1 3.4 - 5.3 mmol/L    Chloride 101 98 - 107 mmol/L    Carbon Dioxide (CO2) 24 22 - 29 mmol/L    Anion Gap 12 7 - 15 mmol/L    Urea Nitrogen 21.8 (H) 6.0 - 20.0 mg/dL    Creatinine 1.09 (H) 0.51 - 0.95 mg/dL    GFR Estimate 62 >60 mL/min/1.73m2    Calcium 8.8 8.8 - 10.4 mg/dL    Glucose 102 (H) 70 - 99 mg/dL   Troponin T, High Sensitivity     Status: Normal   Result Value Ref Range    Troponin T, High Sensitivity <6 <=14 ng/L   [place] Draw     Status: None    Narrative    The following orders were created for panel order [place] Draw.  Procedure                               Abnormality         Status                     ---------                               -----------         ------                     Extra Blue Top Tube[[id number]]                              Final result               Extra Red Top Tube[[id number]]                               Final result                 Please view results for these tests on the individual orders.   CBC with platelets and differential     Status: Abnormal   Result Value Ref Range    WBC Count 8.9 4.0 - 11.0 10e3/uL    RBC Count 5.32 (H) 3.80 - 5.20 10e6/uL    Hemoglobin 14.6 11.7 - 15.7 g/dL    Hematocrit 44.4 35.0 - 47.0 %    MCV 84 78 - 100 fL    MCH 27.4 26.5 - 33.0 pg    MCHC 32.9 31.5 - 36.5 g/dL    RDW 13.8 10.0 - 15.0 %    Platelet Count 183 150 - 450 10e3/uL    % Neutrophils 62 %    % Lymphocytes 30 %    % Monocytes 6 %    % Eosinophils 1 %    %  Basophils 1 %    % Immature Granulocytes 0 %    NRBCs per 100 WBC 0 <1 /100    Absolute Neutrophils 5.5 1.6 - 8.3 10e3/uL    Absolute Lymphocytes 2.7 0.8 - 5.3 10e3/uL    Absolute Monocytes 0.6 0.0 - 1.3 10e3/uL    Absolute Eosinophils 0.1 0.0 - 0.7 10e3/uL    Absolute Basophils 0.0 0.0 - 0.2 10e3/uL    Absolute Immature Granulocytes 0.0 <=0.4 10e3/uL    Absolute NRBCs 0.0 10e3/uL   Extra Blue Top Tube     Status: None   Result Value Ref Range    Hold Specimen JIC    Extra Red Top Tube     Status: None   Result Value Ref Range    Hold Specimen JIC    EKG 12 lead     Status: None (Preliminary result)   Result Value Ref Range    Systolic Blood Pressure  mmHg    Diastolic Blood Pressure  mmHg    Ventricular Rate 75 BPM    Atrial Rate 75 BPM    NC Interval 178 ms    QRS Duration 86 ms     ms    QTc 448 ms    P Axis 66 degrees    R AXIS 5 degrees    T Axis 36 degrees    Interpretation ECG       Sinus rhythm  Normal ECG  No previous ECGs available     CBC with Platelets & Differential     Status: Abnormal    Narrative    The following orders were created for panel order CBC with Platelets & Differential.  Procedure                               Abnormality         Status                     ---------                               -----------         ------                     CBC with platelets and d...[[id number]]  Abnormal            Final result                 Please view results for these tests on the individual orders.       Emergency Department Course & Assessments:         Disposition:  The patient was discharged to home.     Impression & Plan        Medical Decision Making:  This a pleasant 49-year-old female presents for evaluation of elevated blood pressure, with the above-noted symptoms.  She is currently asymptomatic.  Repeat blood pressure in the department is 146/104.  There is no indication for brain imaging at this time and she has a normal neurologic exam.  EKG and troponin are nonischemic.  She  does have a slight elevation in creatinine from previous, but this may represent prerenal picture as BUN is also elevated.  I discussed natural history of hypertension, and that I suspect she has this.  She would like to initiate low-dose amlodipine and follow-up with primary care.  Plan blood pressure monitoring in the meantime.  Return precautions for chest pain, headache, stroke symptoms, or any other concerns, and primary care follow-up in 1 week.        Diagnosis:  Visit Diagnosis, Associated Orders, and Comments     ICD-10-CM    1. Elevated blood pressure reading  R03.0                Rony Duff MD  10/21/24 1925

## 2024-10-21 NOTE — ED TRIAGE NOTES
Pt presents to  Ed stating for the last week she has had a headache and yesterday she had chest pain. Pt states she took tylenol today and the pain went away. Today pt states she can feel her BP in her ears. Pt has never had HTN and doesn't take medication. BP in triage 163/123.

## 2024-10-22 ENCOUNTER — PATIENT OUTREACH (OUTPATIENT)
Dept: FAMILY MEDICINE | Facility: CLINIC | Age: 49
End: 2024-10-22

## 2024-10-22 ENCOUNTER — OFFICE VISIT (OUTPATIENT)
Dept: FAMILY MEDICINE | Facility: CLINIC | Age: 49
End: 2024-10-22
Payer: COMMERCIAL

## 2024-10-22 ENCOUNTER — TELEPHONE (OUTPATIENT)
Dept: FAMILY MEDICINE | Facility: CLINIC | Age: 49
End: 2024-10-22

## 2024-10-22 VITALS
WEIGHT: 191 LBS | SYSTOLIC BLOOD PRESSURE: 144 MMHG | HEART RATE: 92 BPM | RESPIRATION RATE: 14 BRPM | DIASTOLIC BLOOD PRESSURE: 96 MMHG | OXYGEN SATURATION: 96 % | HEIGHT: 64 IN | BODY MASS INDEX: 32.61 KG/M2 | TEMPERATURE: 98.4 F

## 2024-10-22 DIAGNOSIS — E66.09 CLASS 1 OBESITY DUE TO EXCESS CALORIES WITHOUT SERIOUS COMORBIDITY WITH BODY MASS INDEX (BMI) OF 32.0 TO 32.9 IN ADULT: ICD-10-CM

## 2024-10-22 DIAGNOSIS — E66.811 CLASS 1 OBESITY DUE TO EXCESS CALORIES WITHOUT SERIOUS COMORBIDITY WITH BODY MASS INDEX (BMI) OF 32.0 TO 32.9 IN ADULT: ICD-10-CM

## 2024-10-22 DIAGNOSIS — C73 FOLLICULAR CARCINOMA OF THYROID (H): ICD-10-CM

## 2024-10-22 DIAGNOSIS — R03.0 ELEVATED BLOOD PRESSURE READING WITHOUT DIAGNOSIS OF HYPERTENSION: Primary | ICD-10-CM

## 2024-10-22 DIAGNOSIS — E89.0 HISTORY OF TOTAL THYROIDECTOMY: ICD-10-CM

## 2024-10-22 PROBLEM — Z90.89 HISTORY OF TOTAL THYROIDECTOMY: Status: ACTIVE | Noted: 2024-10-22

## 2024-10-22 PROBLEM — R59.0 CERVICAL ADENOPATHY: Status: RESOLVED | Noted: 2024-04-29 | Resolved: 2024-10-22

## 2024-10-22 PROBLEM — Z98.890 S/P COMPLETE THYROIDECTOMY: Status: RESOLVED | Noted: 2023-10-30 | Resolved: 2024-10-22

## 2024-10-22 PROBLEM — Z90.89 S/P COMPLETE THYROIDECTOMY: Status: RESOLVED | Noted: 2023-10-30 | Resolved: 2024-10-22

## 2024-10-22 PROBLEM — Z85.850 HISTORY OF THYROID CANCER: Status: RESOLVED | Noted: 2023-10-30 | Resolved: 2024-10-22

## 2024-10-22 PROBLEM — Z98.890 HISTORY OF TOTAL THYROIDECTOMY: Status: ACTIVE | Noted: 2024-10-22

## 2024-10-22 LAB
ATRIAL RATE - MUSE: 75 BPM
DIASTOLIC BLOOD PRESSURE - MUSE: NORMAL MMHG
INTERPRETATION ECG - MUSE: NORMAL
P AXIS - MUSE: 66 DEGREES
PR INTERVAL - MUSE: 178 MS
QRS DURATION - MUSE: 86 MS
QT - MUSE: 402 MS
QTC - MUSE: 448 MS
R AXIS - MUSE: 5 DEGREES
SYSTOLIC BLOOD PRESSURE - MUSE: NORMAL MMHG
T AXIS - MUSE: 36 DEGREES
VENTRICULAR RATE- MUSE: 75 BPM

## 2024-10-22 PROCEDURE — 99214 OFFICE O/P EST MOD 30 MIN: CPT | Performed by: FAMILY MEDICINE

## 2024-10-22 PROCEDURE — G2211 COMPLEX E/M VISIT ADD ON: HCPCS | Performed by: FAMILY MEDICINE

## 2024-10-22 RX ORDER — TIRZEPATIDE 2.5 MG/.5ML
2.5 INJECTION, SOLUTION SUBCUTANEOUS
Qty: 2 ML | Refills: 0 | Status: SHIPPED | OUTPATIENT
Start: 2024-10-22

## 2024-10-22 RX ORDER — LEVOTHYROXINE SODIUM 137 UG/1
137 TABLET ORAL DAILY
Qty: 90 TABLET | Refills: 4 | Status: SHIPPED | OUTPATIENT
Start: 2024-10-22

## 2024-10-22 NOTE — PROGRESS NOTES
"  Assessment & Plan     Elevated blood pressure reading without diagnosis of hypertension - unclear why BP jumped up so much over the course of the past 1 month. Denies significant stress. Has experienced weight gain over the past several months. Normal BMP, no recent changes to thyroid meds. No significant snoring, although does have daytime fatigue. EKG normal. Has slight kidney dysfunction but looks prerenal. Will plan to repeat labs in 1 week along with BP recheck after she starts low dose amlodipine prescribed by ER.   - Echocardiogram Complete; Future  - Aldosterone; Future  - Basic metabolic panel  (Ca, Cl, CO2, Creat, Gluc, K, Na, BUN); Future    Class 1 obesity due to excess calories without serious comorbidity with body mass index (BMI) of 32.0 to 32.9 in adult - requesting help with weight loss. Discussed GLP1 agonists. 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 Reviewed possible side effects.    - ZEPBOUND 2.5 MG/0.5ML prefilled pen; Inject 0.5 mLs (2.5 mg) subcutaneously every 7 days.    History of total thyroidectomy    Follicular carcinoma of thyroid (H)  - levothyroxine (SYNTHROID/LEVOTHROID) 137 MCG tablet; Take 1 tablet (137 mcg) by mouth daily. Starting after the thyroid surgery.    The longitudinal plan of care for the diagnosis(es)/condition(s) as documented were addressed during this visit. Due to the added complexity in care, I will continue to support Elisabet in the subsequent management and with ongoing continuity of care.      MED REC REQUIRED  Post Medication Reconciliation Status:  Discharge medications reconciled, continue medications without change  BMI  Estimated body mass index is 32.79 kg/m  as calculated from the following:    Height as of this encounter: 1.626 m (5' 4\").    Weight as of this encounter: 86.6 kg (191 lb).       Subjective   Elisabet is a 49 year old, presenting for the following health issues:  Hospital F/U        10/22/2024     9:09 AM " "  Additional Questions   Roomed by Javon Samayoa   Accompanied by self     HPI     ED/UC Followup:    Facility:  [facility]   Date of visit: 10-21-24  Reason for visit: Hypertension  Current Status: headache is better.         Review of Systems  Constitutional, neuro, ENT, endocrine, pulmonary, cardiac, gastrointestinal, genitourinary, musculoskeletal, integument and psychiatric systems are negative, except as otherwise noted.      Objective    BP (!) 144/96 (BP Location: Right arm, Patient Position: Chair, Cuff Size: Adult Regular)   Pulse 92   Temp 98.4  F (36.9  C) (Oral)   Resp 14   Ht 1.626 m (5' 4\")   Wt 86.6 kg (191 lb)   LMP 09/04/2024 (Exact Date)   SpO2 96%   Breastfeeding No   BMI 32.79 kg/m    Body mass index is 32.79 kg/m .  Physical Exam   GENERAL: alert and no distress  PSYCH: mentation appears normal, affect normal/bright          Signed Electronically by: Marielle Mancilla MD    "

## 2024-10-22 NOTE — DISCHARGE INSTRUCTIONS
It was a pleasure taking care of you today. I hope you feel much better soon.  Take amlodipine 2.5 mg daily for blood pressure management. Occasionally, patients can experience mild leg swelling with this medication. If this is the case, you can change to multiple other options.  Please follow-up with your primary care doctor in 1 week. Your dose may need to be adjusted at that time.  Return immediately for chest pain, headache, or any other concerns.

## 2024-10-22 NOTE — TELEPHONE ENCOUNTER
ED / Discharge Outreach Protocol    Patient Contact    Attempt # 1    Was call answered?  No.  Left message on voicemail with information to call back.  IP F/U    Date: 10/21/2024  Diagnosis: Elevated BP    Of note patient had appointment this morning with PCP for ED follow up. I was able to reach her on second attempt but since she has already been seen by Dr. Mancilla this morning will close encounter.       Transitions of Care Outreach  Chief Complaint   Patient presents with    Hospital F/U     Hospital/ED Discharge 10/21/24       Most Recent Admission Date: 10/21/2024   Most Recent Admission Diagnosis:      Most Recent Discharge Date: 10/21/2024   Most Recent Discharge Diagnosis: Elevated blood pressure reading - R03.0     Transitions of Care Assessment         Follow up Plan          Future Appointments   Date Time Provider Department Center   10/31/2024  4:00 PM LV MA/LPN [facility]   10/31/2024  4:00 PM LV LAB [facility]   12/9/2024  4:40 PM Andreina Clarke MD [facility]   12/16/2024  2:00 PM Marielle Mancilla MD [facility]       Outpatient Plan as outlined on AVS reviewed with patient.    For any urgent concerns, please contact our 24 hour nurse triage line: [phone] ([phone])       Bailee Brody RN          Next 5 appointments (look out 90 days)      Oct 31, 2024 4:00 PM  MA Visit with LV MA/LPN  [facility] ([facility] ) [address] [address] [address] [phone]     Dec 16, 2024 2:00 PM  (Arrive by 1:40 PM)  Adult Preventative Visit with Marielle Mancilla MD  [facility] ([facility] ) [address] [address] [address] [phone]

## 2024-10-23 NOTE — TELEPHONE ENCOUNTER
PA Initiation    Medication: ZEPBOUND 2.5 MG/0.5ML SC SOAJ  Insurance Company: [employer] - Phone [phone] Fax [phone]  Pharmacy Filling the Rx: [id number] - [place], [place] - [address]  Filling Pharmacy Phone: [phone]  Filling Pharmacy Fax: [phone]  Start Date: 10/23/2024

## 2024-10-24 NOTE — TELEPHONE ENCOUNTER
Prior Authorization Approval    Medication: ZEPBOUND 2.5 MG/0.5ML SC SOAJ  Authorization Effective Date: 9/24/2024  Authorization Expiration Date: 10/24/2025  Approved Dose/Quantity: as written  Reference #: [id number]   Insurance Company: [employer] - Phone [phone] Fax [phone]  Which Pharmacy is filling the prescription: [id number] [address]  Pharmacy Notified: y  Patient Notified: Instructed pharmacy to notify patient once order is ready.

## 2024-10-25 RX ORDER — LEVOTHYROXINE SODIUM 137 UG/1
137 TABLET ORAL DAILY
Qty: 90 TABLET | Refills: 4 | OUTPATIENT
Start: 2024-10-25

## 2024-10-25 NOTE — TELEPHONE ENCOUNTER
LEVOTHROID) 137 MCG tablet     The original prescription was reordered on 10/22/2024 by Marielle Mancilla MD

## 2024-10-31 ENCOUNTER — ALLIED HEALTH/NURSE VISIT (OUTPATIENT)
Dept: FAMILY MEDICINE | Facility: CLINIC | Age: 49
End: 2024-10-31
Payer: COMMERCIAL

## 2024-10-31 ENCOUNTER — LAB (OUTPATIENT)
Dept: LAB | Facility: CLINIC | Age: 49
End: 2024-10-31
Payer: COMMERCIAL

## 2024-10-31 VITALS — SYSTOLIC BLOOD PRESSURE: 125 MMHG | DIASTOLIC BLOOD PRESSURE: 85 MMHG

## 2024-10-31 DIAGNOSIS — Z01.30 BP CHECK: Primary | ICD-10-CM

## 2024-10-31 DIAGNOSIS — E89.0 POSTSURGICAL HYPOTHYROIDISM: ICD-10-CM

## 2024-10-31 DIAGNOSIS — R03.0 ELEVATED BLOOD PRESSURE READING WITHOUT DIAGNOSIS OF HYPERTENSION: ICD-10-CM

## 2024-10-31 DIAGNOSIS — C73 FOLLICULAR CARCINOMA OF THYROID (H): ICD-10-CM

## 2024-10-31 PROCEDURE — 99000 SPECIMEN HANDLING OFFICE-LAB: CPT

## 2024-10-31 PROCEDURE — 86800 THYROGLOBULIN ANTIBODY: CPT | Mod: 90

## 2024-10-31 PROCEDURE — 84439 ASSAY OF FREE THYROXINE: CPT

## 2024-10-31 PROCEDURE — 84443 ASSAY THYROID STIM HORMONE: CPT

## 2024-10-31 PROCEDURE — 36415 COLL VENOUS BLD VENIPUNCTURE: CPT

## 2024-10-31 PROCEDURE — 99207 PR NO CHARGE NURSE ONLY: CPT

## 2024-10-31 PROCEDURE — 80048 BASIC METABOLIC PNL TOTAL CA: CPT

## 2024-10-31 PROCEDURE — 82088 ASSAY OF ALDOSTERONE: CPT

## 2024-10-31 PROCEDURE — 84432 ASSAY OF THYROGLOBULIN: CPT | Mod: 90

## 2024-11-01 LAB
ANION GAP SERPL CALCULATED.3IONS-SCNC: 14 MMOL/L (ref 7–15)
BUN SERPL-MCNC: 17.7 MG/DL (ref 6–20)
CALCIUM SERPL-MCNC: 9.4 MG/DL (ref 8.8–10.4)
CHLORIDE SERPL-SCNC: 102 MMOL/L (ref 98–107)
CREAT SERPL-MCNC: 1.28 MG/DL (ref 0.51–0.95)
EGFRCR SERPLBLD CKD-EPI 2021: 51 ML/MIN/1.73M2
GLUCOSE SERPL-MCNC: 84 MG/DL (ref 70–99)
HCO3 SERPL-SCNC: 22 MMOL/L (ref 22–29)
POTASSIUM SERPL-SCNC: 3.9 MMOL/L (ref 3.4–5.3)
SODIUM SERPL-SCNC: 138 MMOL/L (ref 135–145)
T4 FREE SERPL-MCNC: 1.62 NG/DL (ref 0.9–1.7)
TSH SERPL DL<=0.005 MIU/L-ACNC: 1.44 UIU/ML (ref 0.3–4.2)

## 2024-11-04 DIAGNOSIS — R03.0 ELEVATED BLOOD PRESSURE READING WITHOUT DIAGNOSIS OF HYPERTENSION: Primary | ICD-10-CM

## 2024-11-04 RX ORDER — AMLODIPINE BESYLATE 2.5 MG/1
2.5 TABLET ORAL DAILY
Qty: 30 TABLET | Refills: 0 | Status: SHIPPED | OUTPATIENT
Start: 2024-11-04

## 2024-11-04 NOTE — TELEPHONE ENCOUNTER
Pt calling about her labs and also her BP medication that she was started on in the ER     Recent /80 - pt stated this was taken a few days ago    Denies: CP, SOB, headaches, blurred vision, N/V, numbness or tingling.   MADIHA pt did start Zepbound on 10/30/2024     Please review and advise     Thank you     Best # [phone] ok LM     Lisa Rodriguez RN, BSN  [facility]

## 2024-11-05 LAB — ALDOST SERPL-MCNC: 28.3 NG/DL (ref 0–31)

## 2024-11-08 ENCOUNTER — VIRTUAL VISIT (OUTPATIENT)
Dept: FAMILY MEDICINE | Facility: CLINIC | Age: 49
End: 2024-11-08
Payer: COMMERCIAL

## 2024-11-08 DIAGNOSIS — R79.89 ELEVATED SERUM CREATININE: Primary | ICD-10-CM

## 2024-11-08 LAB — SCANNED LAB RESULT: NORMAL

## 2024-11-08 PROCEDURE — 99213 OFFICE O/P EST LOW 20 MIN: CPT | Mod: 95 | Performed by: FAMILY MEDICINE

## 2024-11-08 NOTE — PROGRESS NOTES
"Elisabet is a 49 year old who is being evaluated via a billable video visit.    How would you like to obtain your AVS? test companyhar  If the video visit is dropped, the invitation should be resent by: Text to cell phone: [phone] at [employer]  Will anyone else be joining your video visit? No      Assessment & Plan     (R79.89) Elevated serum creatinine  (primary encounter diagnosis)  Comment: patient was started on Zep bound for weight loss .  We discussed the blood tests in detail .  We discussed diet modification .   We discussed hydration and increased fluid intake .     Patient has a follow up scheduled for physical , recommend repeat blood test in 1 month .          BMI  Estimated body mass index is 32.79 kg/m  as calculated from the following:    Height as of 10/22/24: 1.626 m (5' 4\").    Weight as of 10/22/24: 86.6 kg (191 lb).   Weight management plan: Discussed healthy diet and exercise guidelines      Subjective   Elisabet is a 49 year old, presenting for the following health issues:  Recheck Medication    History of Present Illness       Reason for visit:  NEW MEDICATION QUESTION   She is taking medications regularly.       Medication  Zepbound  Taking Medication as prescribed: yes  Side Effects:  None  Medication Helping Symptoms:  yes    Hypertension Follow-up    Do you check your blood pressure regularly outside of the clinic? Yes   Are you following a low salt diet? Yes  Are your blood pressures ever more than 140 on the top number (systolic) OR more   than 90 on the bottom number (diastolic), for example 140/90? No      Objective           Vitals:  No vitals were obtained today due to virtual visit.    Physical Exam   GENERAL: alert and no distress  EYES: Eyes grossly normal to inspection.  No discharge or erythema, or obvious scleral/conjunctival abnormalities.  RESP: No audible wheeze, cough, or visible cyanosis.    SKIN: Visible skin clear. No significant rash, abnormal pigmentation or lesions.  NEURO: " Cranial nerves grossly intact.  Mentation and speech appropriate for age.  PSYCH: Appropriate affect, tone, and pace of words          Video-Visit Details    Type of service:  Video Visit   Originating Location (pt. Location): Home    Distant Location (provider location):  On-site  Platform used for Video Visit: Diego  Signed Electronically by: Aminah Navarro MD

## 2024-11-14 ENCOUNTER — VIRTUAL VISIT (OUTPATIENT)
Dept: FAMILY MEDICINE | Facility: CLINIC | Age: 49
End: 2024-11-14
Payer: COMMERCIAL

## 2024-11-14 DIAGNOSIS — N17.9 AKI (ACUTE KIDNEY INJURY) (H): Primary | ICD-10-CM

## 2024-11-14 PROCEDURE — 99213 OFFICE O/P EST LOW 20 MIN: CPT | Mod: 95 | Performed by: FAMILY MEDICINE

## 2024-11-14 NOTE — PROGRESS NOTES
"Elisabet is a 49 year old who is being evaluated via a billable video visit.    How would you like to obtain your AVS? MyChart  If the video visit is dropped, the invitation should be resent by: Text to cell phone: [phone]  Will anyone else be joining your video visit? No      Assessment & Plan     STEVEN (acute kidney injury) (H) - suspect related to protein intake versus zepbound start. Will check Cr again in a week to ensure no further worsening. Will need to cautiously monitor kidney function with dose increases in the future. If kidney function has worsened, will need to stop GLP1 and we can discuss other weight loss options.   - Basic metabolic panel  (Ca, Cl, CO2, Creat, Gluc, K, Na, BUN); Future      Subjective   Elisabet is a 49 year old, presenting for the following health issues:  Recheck Medication (- Zepbound)        11/14/2024    10:14 AM   Additional Questions   Roomed by VICKI Rowley   Accompanied by Self     History of Present Illness       Reason for visit:  NEW MEDICATION QUESTION   She is taking medications regularly.         Review of Systems  Constitutional, HEENT, cardiovascular, pulmonary, gi and gu systems are negative, except as otherwise noted.      Objective    Vitals - Patient Reported  Weight (Patient Reported): 81.2 kg (179 lb)  Height (Patient Reported): 162.6 cm (5' 4\")  BMI (Based on Pt Reported Ht/Wt): 30.72        Physical Exam   GENERAL: alert and no distress  EYES: Eyes grossly normal to inspection.  No discharge or erythema, or obvious scleral/conjunctival abnormalities.  RESP: No audible wheeze, cough, or visible cyanosis.    SKIN: Visible skin clear. No significant rash, abnormal pigmentation or lesions.  NEURO: Cranial nerves grossly intact.  Mentation and speech appropriate for age.  PSYCH: Appropriate affect, tone, and pace of words        Video-Visit Details    Type of service:  Video Visit   Originating Location (pt. Location): Home    Distant Location (provider " location):  Off-site  Platform used for Video Visit: Diego  Signed Electronically by: Marielle Mancilla MD    Answers submitted by the patient for this visit:  General Questionnaire (Submitted on 11/7/2024)  Chief Complaint: Chronic problems general questions HPI Form  What is the reason for your visit today? : NEW MEDICATION QUESTION  How many days per week do you miss taking your medication?: 0  Questionnaire about: Chronic problems general questions HPI Form (Submitted on 11/7/2024)  Chief Complaint: Chronic problems general questions HPI Form

## 2024-11-15 ENCOUNTER — HOSPITAL ENCOUNTER (OUTPATIENT)
Dept: CARDIOLOGY | Facility: CLINIC | Age: 49
Discharge: HOME | End: 2024-11-15
Attending: FAMILY MEDICINE | Admitting: FAMILY MEDICINE
Payer: COMMERCIAL

## 2024-11-15 DIAGNOSIS — R03.0 ELEVATED BLOOD PRESSURE READING WITHOUT DIAGNOSIS OF HYPERTENSION: ICD-10-CM

## 2024-11-15 LAB — LVEF ECHO: NORMAL

## 2024-11-15 PROCEDURE — 93306 TTE W/DOPPLER COMPLETE: CPT | Mod: 26 | Performed by: INTERNAL MEDICINE

## 2024-11-15 PROCEDURE — 93306 TTE W/DOPPLER COMPLETE: CPT

## 2024-11-17 ENCOUNTER — LAB (OUTPATIENT)
Dept: LAB | Facility: CLINIC | Age: 49
End: 2024-11-17
Payer: COMMERCIAL

## 2024-11-17 DIAGNOSIS — N17.9 AKI (ACUTE KIDNEY INJURY) (H): ICD-10-CM

## 2024-11-17 LAB
ANION GAP SERPL CALCULATED.3IONS-SCNC: 11 MMOL/L (ref 7–15)
BUN SERPL-MCNC: 13.3 MG/DL (ref 6–20)
CALCIUM SERPL-MCNC: 9.2 MG/DL (ref 8.8–10.4)
CHLORIDE SERPL-SCNC: 103 MMOL/L (ref 98–107)
CREAT SERPL-MCNC: 0.76 MG/DL (ref 0.51–0.95)
EGFRCR SERPLBLD CKD-EPI 2021: >90 ML/MIN/1.73M2
GLUCOSE SERPL-MCNC: 97 MG/DL (ref 70–99)
HCO3 SERPL-SCNC: 24 MMOL/L (ref 22–29)
POTASSIUM SERPL-SCNC: 3.9 MMOL/L (ref 3.4–5.3)
SODIUM SERPL-SCNC: 138 MMOL/L (ref 135–145)

## 2024-11-17 PROCEDURE — 80048 BASIC METABOLIC PNL TOTAL CA: CPT

## 2024-11-17 PROCEDURE — 36415 COLL VENOUS BLD VENIPUNCTURE: CPT

## 2024-11-25 ENCOUNTER — MYC MEDICAL ADVICE (OUTPATIENT)
Dept: FAMILY MEDICINE | Facility: CLINIC | Age: 49
End: 2024-11-25
Payer: COMMERCIAL

## 2024-11-27 SDOH — HEALTH STABILITY: PHYSICAL HEALTH: ON AVERAGE, HOW MANY DAYS PER WEEK DO YOU ENGAGE IN MODERATE TO STRENUOUS EXERCISE (LIKE A BRISK WALK)?: 2 DAYS

## 2024-11-27 SDOH — HEALTH STABILITY: PHYSICAL HEALTH: ON AVERAGE, HOW MANY MINUTES DO YOU ENGAGE IN EXERCISE AT THIS LEVEL?: 30 MIN

## 2024-11-27 ASSESSMENT — SOCIAL DETERMINANTS OF HEALTH (SDOH): HOW OFTEN DO YOU GET TOGETHER WITH FRIENDS OR RELATIVES?: ONCE A WEEK

## 2024-12-02 ENCOUNTER — OFFICE VISIT (OUTPATIENT)
Dept: FAMILY MEDICINE | Facility: CLINIC | Age: 49
End: 2024-12-02
Attending: FAMILY MEDICINE
Payer: COMMERCIAL

## 2024-12-02 VITALS
WEIGHT: 171.9 LBS | TEMPERATURE: 98.6 F | DIASTOLIC BLOOD PRESSURE: 84 MMHG | HEART RATE: 96 BPM | BODY MASS INDEX: 28.64 KG/M2 | OXYGEN SATURATION: 98 % | SYSTOLIC BLOOD PRESSURE: 123 MMHG | HEIGHT: 65 IN

## 2024-12-02 DIAGNOSIS — Z00.00 ROUTINE GENERAL MEDICAL EXAMINATION AT A HEALTH CARE FACILITY: Primary | ICD-10-CM

## 2024-12-02 PROBLEM — R53.83 FATIGUE, UNSPECIFIED TYPE: Status: RESOLVED | Noted: 2023-10-17 | Resolved: 2024-12-02

## 2024-12-02 LAB
ALBUMIN SERPL BCG-MCNC: 4.4 G/DL (ref 3.5–5.2)
ALP SERPL-CCNC: 58 U/L (ref 40–150)
ALT SERPL W P-5'-P-CCNC: 9 U/L (ref 0–50)
ANION GAP SERPL CALCULATED.3IONS-SCNC: 16 MMOL/L (ref 7–15)
AST SERPL W P-5'-P-CCNC: 16 U/L (ref 0–45)
BILIRUB SERPL-MCNC: 0.4 MG/DL
BUN SERPL-MCNC: 14.6 MG/DL (ref 6–20)
CALCIUM SERPL-MCNC: 9.7 MG/DL (ref 8.8–10.4)
CHLORIDE SERPL-SCNC: 98 MMOL/L (ref 98–107)
CHOLEST SERPL-MCNC: 190 MG/DL
CREAT SERPL-MCNC: 0.77 MG/DL (ref 0.51–0.95)
EGFRCR SERPLBLD CKD-EPI 2021: >90 ML/MIN/1.73M2
ERYTHROCYTE [DISTWIDTH] IN BLOOD BY AUTOMATED COUNT: 13.4 % (ref 10–15)
EST. AVERAGE GLUCOSE BLD GHB EST-MCNC: 97 MG/DL
FASTING STATUS PATIENT QL REPORTED: NO
FASTING STATUS PATIENT QL REPORTED: NO
GLUCOSE SERPL-MCNC: 90 MG/DL (ref 70–99)
HBA1C MFR BLD: 5 % (ref 0–5.6)
HCO3 SERPL-SCNC: 22 MMOL/L (ref 22–29)
HCT VFR BLD AUTO: 45.9 % (ref 35–47)
HDLC SERPL-MCNC: 54 MG/DL
HGB BLD-MCNC: 15.6 G/DL (ref 11.7–15.7)
LDLC SERPL CALC-MCNC: 118 MG/DL
MCH RBC QN AUTO: 28.1 PG (ref 26.5–33)
MCHC RBC AUTO-ENTMCNC: 34 G/DL (ref 31.5–36.5)
MCV RBC AUTO: 83 FL (ref 78–100)
NONHDLC SERPL-MCNC: 136 MG/DL
PLATELET # BLD AUTO: 200 10E3/UL (ref 150–450)
POTASSIUM SERPL-SCNC: 3.9 MMOL/L (ref 3.4–5.3)
PROT SERPL-MCNC: 8 G/DL (ref 6.4–8.3)
RBC # BLD AUTO: 5.56 10E6/UL (ref 3.8–5.2)
SODIUM SERPL-SCNC: 136 MMOL/L (ref 135–145)
TRIGL SERPL-MCNC: 92 MG/DL
WBC # BLD AUTO: 8.9 10E3/UL (ref 4–11)

## 2024-12-02 PROCEDURE — 90471 IMMUNIZATION ADMIN: CPT | Performed by: FAMILY MEDICINE

## 2024-12-02 PROCEDURE — 80053 COMPREHEN METABOLIC PANEL: CPT | Performed by: FAMILY MEDICINE

## 2024-12-02 PROCEDURE — 99396 PREV VISIT EST AGE 40-64: CPT | Mod: 25 | Performed by: FAMILY MEDICINE

## 2024-12-02 PROCEDURE — 85027 COMPLETE CBC AUTOMATED: CPT | Performed by: FAMILY MEDICINE

## 2024-12-02 PROCEDURE — 36415 COLL VENOUS BLD VENIPUNCTURE: CPT | Performed by: FAMILY MEDICINE

## 2024-12-02 PROCEDURE — 83036 HEMOGLOBIN GLYCOSYLATED A1C: CPT | Performed by: FAMILY MEDICINE

## 2024-12-02 PROCEDURE — 90656 IIV3 VACC NO PRSV 0.5 ML IM: CPT | Performed by: FAMILY MEDICINE

## 2024-12-02 PROCEDURE — 80061 LIPID PANEL: CPT | Performed by: FAMILY MEDICINE

## 2024-12-02 ASSESSMENT — PAIN SCALES - GENERAL: PAINLEVEL_OUTOF10: NO PAIN (0)

## 2024-12-02 NOTE — PATIENT INSTRUCTIONS
Patient Education   Preventive Care Advice   This is general advice given by our system to help you stay healthy. However, your care team may have specific advice just for you. Please talk to your care team about your preventive care needs.  Nutrition  Eat 5 or more servings of fruits and vegetables each day.  Try wheat bread, brown rice and whole grain pasta (instead of white bread, rice, and pasta).  Get enough calcium and vitamin D. Check the label on foods and aim for 100% of the RDA (recommended daily allowance).  Lifestyle  Exercise at least 150 minutes each week  (30 minutes a day, 5 days a week).  Do muscle strengthening activities 2 days a week. These help control your weight and prevent disease.  No smoking.  Wear sunscreen to prevent skin cancer.  Have a dental exam and cleaning every 6 months.  Yearly exams  See your health care team every year to talk about:  Any changes in your health.  Any medicines your care team has prescribed.  Preventive care, family planning, and ways to prevent chronic diseases.  Shots (vaccines)   HPV shots (up to age 26), if you've never had them before.  Hepatitis B shots (up to age 59), if you've never had them before.  COVID-19 shot: Get this shot when it's due.  Flu shot: Get a flu shot every year.  Tetanus shot: Get a tetanus shot every 10 years.  Pneumococcal, hepatitis A, and RSV shots: Ask your care team if you need these based on your risk.  Shingles shot (for age 50 and up)  General health tests  Diabetes screening:  Starting at age 35, Get screened for diabetes at least every 3 years.  If you are younger than age 35, ask your care team if you should be screened for diabetes.  Cholesterol test: At age 39, start having a cholesterol test every 5 years, or more often if advised.  Bone density scan (DEXA): At age 50, ask your care team if you should have this scan for osteoporosis (brittle bones).  Hepatitis C: Get tested at least once in your life.  STIs (sexually  transmitted infections)  Before age 24: Ask your care team if you should be screened for STIs.  After age 24: Get screened for STIs if you're at risk. You are at risk for STIs (including HIV) if:  You are sexually active with more than one person.  You don't use condoms every time.  You or a partner was diagnosed with a sexually transmitted infection.  If you are at risk for HIV, ask about PrEP medicine to prevent HIV.  Get tested for HIV at least once in your life, whether you are at risk for HIV or not.  Cancer screening tests  Cervical cancer screening: If you have a cervix, begin getting regular cervical cancer screening tests starting at age 21.  Breast cancer scan (mammogram): If you've ever had breasts, begin having regular mammograms starting at age 40. This is a scan to check for breast cancer.  Colon cancer screening: It is important to start screening for colon cancer at age 45.  Have a colonoscopy test every 10 years (or more often if you're at risk) Or, ask your provider about stool tests like a FIT test every year or Cologuard test every 3 years.  To learn more about your testing options, visit:   .  For help making a decision, visit:   https://bit.ly/ou20840.  Prostate cancer screening test: If you have a prostate, ask your care team if a prostate cancer screening test (PSA) at age 55 is right for you.  Lung cancer screening: If you are a current or former smoker ages 50 to 80, ask your care team if ongoing lung cancer screenings are right for you.  For informational purposes only. Not to replace the advice of your health care provider. Copyright   2023 [employer]. All rights reserved. Clinically reviewed by the [facility] Transitions Program. [employer] 732774 - REV 01/24.

## 2024-12-02 NOTE — PROGRESS NOTES
Preventive Care Visit  [facility]  Marielle Mancilla MD, Family Medicine  Dec 2, 2024      Assessment & Plan     Routine general medical examination at a health care facility  - Lipid panel reflex to direct LDL Fasting; Future  - Comprehensive metabolic panel; Future  - CBC with platelets; Future  - Hemoglobin A1c; Future  - Lipid panel reflex to direct LDL Fasting  - Comprehensive metabolic panel  - CBC with platelets  - Hemoglobin A1c    Counseling  Appropriate preventive services were addressed with this patient via screening, questionnaire, or discussion as appropriate for fall prevention, nutrition, physical activity, Tobacco-use cessation, social engagement, weight loss and cognition.  Checklist reviewing preventive services available has been given to the patient.  Reviewed patient's diet, addressing concerns and/or questions.   She is at risk for lack of exercise and has been provided with information to increase physical activity for the benefit of her well-being.       Angélica Bhandari is a 49 year old, presenting for the following:  Physical (Here for her annual wellness visit, no-fasting for labs. )        12/2/2024     1:54 PM   Additional Questions   Roomed by Humaira Curry MA Apprentice   Accompanied by Self          HPI      Medication Follow Up  Taking Medication as prescribed: yes  Side Effects:  Nauseas and headache  Medication Helping Symptoms:  yes      Health Care Directive  Patient does not have a Health Care Directive: Discussed advance care planning with patient; however, patient declined at this time.      11/27/2024   General Health   How would you rate your overall physical health? (!) FAIR   Feel stress (tense, anxious, or unable to sleep) Not at all            11/27/2024   Nutrition   Three or more servings of calcium each day? Yes   Diet: Carbohydrate counting   How many servings of fruit and vegetables per day? (!) 2-3   How many sweetened  beverages each day? 0-1            2024   Exercise   Days per week of moderate/strenous exercise 2 days   Average minutes spent exercising at this level 30 min      (!) EXERCISE CONCERN      2024   Social Factors   Frequency of gathering with friends or relatives Once a week   Worry food won't last until get money to buy more No   Food not last or not have enough money for food? No   Do you have housing? (Housing is defined as stable permanent housing and does not include staying ouside in a car, in a tent, in an abandoned building, in an overnight shelter, or couch-surfing.) Yes   Are you worried about losing your housing? No   Lack of transportation? No   Unable to get utilities (heat,electricity)? No            2024   Dental   Dentist two times every year? Yes                     2024   Substance Use   Alcohol more than 3/day or more than 7/wk No   Do you use any other substances recreationally? No        Social History     Tobacco Use    Smoking status: Former     Current packs/day: 0.00     Average packs/day: 0.5 packs/day for 10.0 years (5.0 ttl pk-yrs)     Types: Cigarettes     Start date: 1994     Quit date: 2004     Years since quittin.5     Passive exposure: Never    Smokeless tobacco: Never    Tobacco comments:     total smoking about 5 pack years   Vaping Use    Vaping status: Never Used   Substance Use Topics    Alcohol use: Yes     Comment: less than monthly    Drug use: No           2024   LAST FHS-7 RESULTS   1st degree relative breast or ovarian cancer No   Any relative bilateral breast cancer No   Any male have breast cancer No   Any ONE woman have BOTH breast AND ovarian cancer No   Any woman with breast cancer before 50yrs No   2 or more relatives with breast AND/OR ovarian cancer No   2 or more relatives with breast AND/OR bowel cancer No           Mammogram Screening - Mammogram every 1-2 years updated in Health Maintenance based on mutual decision  making        2024   STI Screening   New sexual partner(s) since last STI/HIV test? No        History of abnormal Pap smear: No - age 30- 64 PAP with HPV every 5 years recommended        Latest Ref Rng & Units 2022     5:46 PM 2010    12:00 AM 10/10/2008    12:00 AM   PAP / HPV   PAP  Negative for Intraepithelial Lesion or Malignancy (NILM)      PAP (Historical)   NIL  NIL    HPV 16 DNA Negative Negative      HPV 18 DNA Negative Negative      Other HR HPV Negative Negative        ASCVD Risk   The 10-year ASCVD risk score (Carlos ABDI, et al., 2019) is: 0.9%    Values used to calculate the score:      Age: 49 years      Sex: Female      Is Non- : No      Diabetic: No      Tobacco smoker: No      Systolic Blood Pressure: 123 mmHg      Is BP treated: No      HDL Cholesterol: 60 mg/dL      Total Cholesterol: 195 mg/dL        2024   Contraception/Family Planning   Questions about contraception or family planning No           Reviewed and updated as needed this visit by Provider                    Patient Active Problem List   Diagnosis    Colon polyp    Former smoker    Postsurgical hypothyroidism    Follicular carcinoma of thyroid (H)    Pulmonary nodules    History of total thyroidectomy     Past Surgical History:   Procedure Laterality Date    BIOPSY  2022    thyroid    THYROIDECTOMY Left 2023    Procedure: Left thyroid lobectomy and isthmusectomy;  Surgeon: Charley Humphreys MD;  Location: [facility] OR    THYROIDECTOMY N/A 2023    Procedure: Completion thyroidectomy;  Surgeon: Charley Humphreys MD;  Location: [facility] OR    TONSILLECTOMY  1980    unilateral (right) tonsillectomy       Social History     Tobacco Use    Smoking status: Former     Current packs/day: 0.00     Average packs/day: 0.5 packs/day for 10.0 years (5.0 ttl pk-yrs)     Types: Cigarettes     Start date: 1994     Quit date: 2004     Years since quittin.5     Passive  "exposure: Never    Smokeless tobacco: Never    Tobacco comments:     total smoking about 5 pack years   Substance Use Topics    Alcohol use: Yes     Comment: less than monthly     Family History   Problem Relation Age of Onset    Heart Disease Mother         pacemaker    Thyroid nodules Mother     Thyroid Disease Mother     Heart Disease Father     Diabetes Father     C.A.D. Father         MI age 62    Alzheimer Disease Father     Hypertension Father     Hyperlipidemia Father     Lipids Brother     Breast Cancer Maternal Grandmother         and MAliset had post-menopausal cancer    Neurologic Disorder Paternal Grandmother         Alzheimer's    Lymphoma Cousin     Cancer - colorectal No family hx of     Thyroid Cancer No family hx of              Review of Systems  Constitutional, neuro, ENT, endocrine, pulmonary, cardiac, gastrointestinal, genitourinary, musculoskeletal, integument and psychiatric systems are negative, except as otherwise noted.     Objective    Exam  /84 (BP Location: Right arm, Patient Position: Sitting, Cuff Size: Adult Large)   Pulse 96   Temp 98.6  F (37  C) (Oral)   Ht 1.645 m (5' 4.75\")   Wt 78 kg (171 lb 14.4 oz)   LMP 12/01/2024 (Exact Date)   SpO2 98%   BMI 28.83 kg/m     Estimated body mass index is 28.83 kg/m  as calculated from the following:    Height as of this encounter: 1.645 m (5' 4.75\").    Weight as of this encounter: 78 kg (171 lb 14.4 oz).    Physical Exam  GENERAL: alert and no distress  EYES: Eyes grossly normal to inspection, PERRL and conjunctivae and sclerae normal  HENT: ear canals and TM's normal, nose and mouth without ulcers or lesions  NECK: no adenopathy, no asymmetry, masses, or scars  RESP: lungs clear to auscultation - no rales, rhonchi or wheezes  BREAST: normal without masses, tenderness or nipple discharge and no palpable axillary masses or adenopathy  CV: regular rate and rhythm, normal S1 S2, no S3 or S4, no murmur, click or rub, no peripheral " edema  ABDOMEN: soft, nontender, no hepatosplenomegaly, no masses and bowel sounds normal  MS: no gross musculoskeletal defects noted, no edema  SKIN: no suspicious lesions or rashes  NEURO: Normal strength and tone, mentation intact and speech normal  PSYCH: mentation appears normal, affect normal/bright        Signed Electronically by: Marielle Mancilla MD

## 2024-12-09 ENCOUNTER — VIRTUAL VISIT (OUTPATIENT)
Dept: ENDOCRINOLOGY | Facility: CLINIC | Age: 49
End: 2024-12-09
Payer: COMMERCIAL

## 2024-12-09 DIAGNOSIS — R79.89 HIGH ALDOSTERONE LEVEL: ICD-10-CM

## 2024-12-09 DIAGNOSIS — C73 FOLLICULAR CARCINOMA OF THYROID (H): Primary | ICD-10-CM

## 2024-12-09 DIAGNOSIS — R59.0 CERVICAL ADENOPATHY: ICD-10-CM

## 2024-12-09 DIAGNOSIS — E89.0 POSTSURGICAL HYPOTHYROIDISM: ICD-10-CM

## 2024-12-09 PROCEDURE — 99214 OFFICE O/P EST MOD 30 MIN: CPT | Mod: 95

## 2024-12-09 PROCEDURE — G2211 COMPLEX E/M VISIT ADD ON: HCPCS | Mod: 95

## 2024-12-09 NOTE — NURSING NOTE
Current patient location: [address] [place] [id number]    Is the patient currently in the state of [place]? YES    Visit mode:VIDEO    If the visit is dropped, the patient can be reconnected by:VIDEO VISIT: Text to cell phone:   Telephone Information:   Mobile [phone]       Will anyone else be joining the visit? NO  (If patient encounters technical issues they should call [phone]    Are changes needed to the allergy or medication list? No and Pt stated no med changes    Are refills needed on medications prescribed by this physician? NO    Rooming Documentation:  Not applicable    Reason for visit: JAYDEN ROBBINS
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
## 2025-01-04 DIAGNOSIS — E66.811 CLASS 1 OBESITY WITH SERIOUS COMORBIDITY AND BODY MASS INDEX (BMI) OF 31.0 TO 31.9 IN ADULT, UNSPECIFIED OBESITY TYPE: ICD-10-CM

## 2025-01-06 ENCOUNTER — MYC MEDICAL ADVICE (OUTPATIENT)
Dept: FAMILY MEDICINE | Facility: CLINIC | Age: 50
End: 2025-01-06
Payer: COMMERCIAL

## 2025-01-06 RX ORDER — TIRZEPATIDE 5 MG/.5ML
INJECTION, SOLUTION SUBCUTANEOUS
Qty: 2 ML | Refills: 3 | Status: SHIPPED | OUTPATIENT
Start: 2025-01-06

## 2025-02-21 ENCOUNTER — HOSPITAL ENCOUNTER (OUTPATIENT)
Dept: MAMMOGRAPHY | Facility: CLINIC | Age: 50
Discharge: HOME | End: 2025-02-21
Attending: FAMILY MEDICINE | Admitting: FAMILY MEDICINE
Payer: COMMERCIAL

## 2025-02-21 DIAGNOSIS — Z12.31 VISIT FOR SCREENING MAMMOGRAM: ICD-10-CM

## 2025-02-21 PROCEDURE — 77063 BREAST TOMOSYNTHESIS BI: CPT

## 2025-02-23 NOTE — TELEPHONE ENCOUNTER
Letter from Dr Clarke for [facility] support sent to Pt via [employer] and faxed to number provided.   Jenifer Dao RN on 12/9/2022 at 10:44 AM      no

## 2025-04-19 ENCOUNTER — LAB (OUTPATIENT)
Dept: LAB | Facility: CLINIC | Age: 50
End: 2025-04-19
Payer: COMMERCIAL

## 2025-04-19 DIAGNOSIS — E66.811 CLASS 1 OBESITY WITH SERIOUS COMORBIDITY AND BODY MASS INDEX (BMI) OF 31.0 TO 31.9 IN ADULT, UNSPECIFIED OBESITY TYPE: ICD-10-CM

## 2025-04-19 DIAGNOSIS — R79.89 HIGH ALDOSTERONE LEVEL: ICD-10-CM

## 2025-04-19 LAB
ALBUMIN SERPL BCG-MCNC: 4.1 G/DL (ref 3.5–5.2)
ALP SERPL-CCNC: 47 U/L (ref 40–150)
ALT SERPL W P-5'-P-CCNC: 9 U/L (ref 0–50)
ANION GAP SERPL CALCULATED.3IONS-SCNC: 9 MMOL/L (ref 7–15)
AST SERPL W P-5'-P-CCNC: 18 U/L (ref 0–45)
BILIRUB SERPL-MCNC: 0.3 MG/DL
BUN SERPL-MCNC: 19.2 MG/DL (ref 6–20)
CALCIUM SERPL-MCNC: 8.8 MG/DL (ref 8.8–10.4)
CHLORIDE SERPL-SCNC: 107 MMOL/L (ref 98–107)
CREAT SERPL-MCNC: 0.77 MG/DL (ref 0.51–0.95)
EGFRCR SERPLBLD CKD-EPI 2021: >90 ML/MIN/1.73M2
GLUCOSE SERPL-MCNC: 96 MG/DL (ref 70–99)
HCO3 SERPL-SCNC: 25 MMOL/L (ref 22–29)
POTASSIUM SERPL-SCNC: 4.2 MMOL/L (ref 3.4–5.3)
PROT SERPL-MCNC: 7.1 G/DL (ref 6.4–8.3)
SODIUM SERPL-SCNC: 141 MMOL/L (ref 135–145)

## 2025-04-19 PROCEDURE — 82088 ASSAY OF ALDOSTERONE: CPT

## 2025-04-19 PROCEDURE — 36415 COLL VENOUS BLD VENIPUNCTURE: CPT

## 2025-04-19 PROCEDURE — 99000 SPECIMEN HANDLING OFFICE-LAB: CPT

## 2025-04-19 PROCEDURE — 84244 ASSAY OF RENIN: CPT | Mod: 90

## 2025-04-19 PROCEDURE — 80053 COMPREHEN METABOLIC PANEL: CPT

## 2025-04-22 LAB — ALDOST SERPL-MCNC: 21 NG/DL (ref 0–31)

## 2025-04-23 LAB — RENIN PLAS-CCNC: <0.1 NG/ML/HR

## 2025-04-24 ENCOUNTER — MYC MEDICAL ADVICE (OUTPATIENT)
Dept: FAMILY MEDICINE | Facility: CLINIC | Age: 50
End: 2025-04-24
Payer: COMMERCIAL

## 2025-04-28 ENCOUNTER — VIRTUAL VISIT (OUTPATIENT)
Dept: ENDOCRINOLOGY | Facility: CLINIC | Age: 50
End: 2025-04-28
Payer: COMMERCIAL

## 2025-04-28 DIAGNOSIS — R79.89 HIGH ALDOSTERONE LEVEL: Primary | ICD-10-CM

## 2025-04-28 DIAGNOSIS — E26.9 HIGH ALDOSTERONE TO RENIN RATIO: ICD-10-CM

## 2025-04-28 PROCEDURE — G2211 COMPLEX E/M VISIT ADD ON: HCPCS

## 2025-04-28 PROCEDURE — 1126F AMNT PAIN NOTED NONE PRSNT: CPT | Mod: 95

## 2025-04-28 PROCEDURE — 98006 SYNCH AUDIO-VIDEO EST MOD 30: CPT

## 2025-04-28 NOTE — NURSING NOTE
Current patient location: [address] [place] [id number]    Is the patient currently in the state of [place]? YES    Visit mode: VIDEO    If the visit is dropped, the patient can be reconnected by:VIDEO VISIT: Text to cell phone:   Telephone Information:   Mobile [phone]       Will anyone else be joining the visit? NO  (If patient encounters technical issues they should call [phone]    Are changes needed to the allergy or medication list? No and Pt stated no med changes    Are refills needed on medications prescribed by this physician? NO    Rooming Documentation:  Not applicable    Reason for visit: JAYDEN ROBBINS
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
## 2025-04-28 NOTE — PROGRESS NOTES
Virtual Visit Details    Type of service:  Video Visit     Originating Location (pt. Location): Home
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
## 2025-05-30 ENCOUNTER — APPOINTMENT (OUTPATIENT)
Dept: LAB | Facility: CLINIC | Age: 50
End: 2025-05-30
Payer: COMMERCIAL

## 2025-06-02 ENCOUNTER — RESULTS FOLLOW-UP (OUTPATIENT)
Dept: ENDOCRINOLOGY | Facility: CLINIC | Age: 50
End: 2025-06-02

## 2025-06-04 ENCOUNTER — TELEPHONE (OUTPATIENT)
Dept: ENDOCRINOLOGY | Facility: CLINIC | Age: 50
End: 2025-06-04
Payer: COMMERCIAL

## 2025-06-04 NOTE — TELEPHONE ENCOUNTER
Sent Mychart (1st Attempt) for the patient to call back and schedule the following:    Appointment type: RETURN ENDOCRINE  Provider: Cande Shukla MD  Return date: next available [facility] in July or later  Specialty phone number: [phone]  Additional appointment(s) needed: N/A  Additonal Notes: Unable to LVM, MyCx1. Keep all other visits for Cande Holland MD Burmeister, Lynn A, MD; [id number]  Thank you Andreina  Clinical coordinators: Please use the next available [facility] in July/early August to schedule. Use 30 minutes appointment slot.  Kidmi     Available appts:  09/02 virtual MG  09/08 1:30/2:00 virtual MG  09/22 2:00 virtual MG  10/01 2:30 in person [facility]    Please note that the above appointment(s) will require manual scheduling as they are marked as [facility] and will not appear using auto search. Do not schedule the patient if another patient has already been scheduled in the requested appointment slot.     Stephenie Burton on 6/4/2025 at 10:34 AM

## 2025-08-13 ENCOUNTER — TRANSFERRED RECORDS (OUTPATIENT)
Dept: HEALTH INFORMATION MANAGEMENT | Facility: CLINIC | Age: 50
End: 2025-08-13
Payer: COMMERCIAL

## 2025-08-18 ENCOUNTER — MYC MEDICAL ADVICE (OUTPATIENT)
Dept: FAMILY MEDICINE | Facility: CLINIC | Age: 50
End: 2025-08-18
Payer: COMMERCIAL

## (undated) DEVICE — NIM PROBE PRASS STIMULATOR PROTECTED TIP 8225101

## (undated) DEVICE — ESU CORD BIPOLAR GREEN 10-4000

## (undated) DEVICE — SU VICRYL 3-0 SH 27" UND J416H

## (undated) DEVICE — SOL NACL 0.9% IRRIG 1000ML BOTTLE 2F7124

## (undated) DEVICE — PREP CHLORAPREP 26ML TINTED HI-LITE ORANGE 930815

## (undated) DEVICE — GLOVE BIOGEL PI SZ 6.5 40865

## (undated) DEVICE — GLOVE BIOGEL PI MICRO INDICATOR UNDERGLOVE SZ 6.5 48965

## (undated) DEVICE — SU VICRYL 2-0 TIE 12X18" J905T

## (undated) DEVICE — DRSG STERI STRIP 1/2X4" R1547

## (undated) DEVICE — ESU GROUND PAD ADULT W/CORD E7507

## (undated) DEVICE — SU MONOCRYL 4-0 P-3 18" UND Y494G

## (undated) DEVICE — SU VICRYL 3-0 TIE 12X18" J904T

## (undated) DEVICE — CATH TRAY FOLEY SURESTEP 16FR DRAIN BAG STATOCK A899916

## (undated) DEVICE — SU VICRYL 4-0 TIE 12X18" DYED J103T

## (undated) DEVICE — LINEN HALF SHEET 5512

## (undated) DEVICE — SU DERMABOND ADVANCED .7ML DNX12

## (undated) DEVICE — LINEN FULL SHEET 5511

## (undated) DEVICE — PREP CHLORAPREP W/ORANGE TINT 10.5ML 260715

## (undated) DEVICE — SU SILK 3-0 SH 30" K832H

## (undated) DEVICE — BAG CLEAR TRASH 1.3M 39X33" P4040C

## (undated) DEVICE — SURGICEL FIBRILLAR HEMOSTAT 1"X2" 1961

## (undated) DEVICE — LINEN ORTHO ACL PACK 5447

## (undated) DEVICE — ESU LIGASURE DISSECTOR EXACT LF2019

## (undated) DEVICE — PACK MAJOR HEAD AND NECK RIDGES

## (undated) DEVICE — ADH SKIN CLOSURE PREMIERPRO EXOFIN 1.0ML 3470

## (undated) DEVICE — SPONGE KITTNER 30-101

## (undated) RX ORDER — PROPOFOL 10 MG/ML
INJECTION, EMULSION INTRAVENOUS
Status: DISPENSED
Start: 2023-01-04

## (undated) RX ORDER — DEXAMETHASONE SODIUM PHOSPHATE 4 MG/ML
INJECTION, SOLUTION INTRA-ARTICULAR; INTRALESIONAL; INTRAMUSCULAR; INTRAVENOUS; SOFT TISSUE
Status: DISPENSED
Start: 2023-08-30

## (undated) RX ORDER — BUPIVACAINE HYDROCHLORIDE 5 MG/ML
INJECTION, SOLUTION EPIDURAL; INTRACAUDAL
Status: DISPENSED
Start: 2023-01-04

## (undated) RX ORDER — GLYCOPYRROLATE 0.2 MG/ML
INJECTION INTRAMUSCULAR; INTRAVENOUS
Status: DISPENSED
Start: 2023-01-04

## (undated) RX ORDER — FENTANYL CITRATE-0.9 % NACL/PF 10 MCG/ML
PLASTIC BAG, INJECTION (ML) INTRAVENOUS
Status: DISPENSED
Start: 2023-08-30

## (undated) RX ORDER — BUPIVACAINE HYDROCHLORIDE 5 MG/ML
INJECTION, SOLUTION EPIDURAL; INTRACAUDAL
Status: DISPENSED
Start: 2023-08-30

## (undated) RX ORDER — ONDANSETRON 2 MG/ML
INJECTION INTRAMUSCULAR; INTRAVENOUS
Status: DISPENSED
Start: 2023-08-30

## (undated) RX ORDER — SCOLOPAMINE TRANSDERMAL SYSTEM 1 MG/1
PATCH, EXTENDED RELEASE TRANSDERMAL
Status: DISPENSED
Start: 2023-01-04

## (undated) RX ORDER — LIDOCAINE HYDROCHLORIDE 20 MG/ML
SOLUTION OROPHARYNGEAL
Status: DISPENSED
Start: 2023-04-20

## (undated) RX ORDER — PROMETHAZINE HYDROCHLORIDE 25 MG/ML
INJECTION, SOLUTION INTRAMUSCULAR; INTRAVENOUS
Status: DISPENSED
Start: 2023-08-30

## (undated) RX ORDER — FENTANYL CITRATE 50 UG/ML
INJECTION, SOLUTION INTRAMUSCULAR; INTRAVENOUS
Status: DISPENSED
Start: 2023-01-04

## (undated) RX ORDER — ACETAMINOPHEN 325 MG/1
TABLET ORAL
Status: DISPENSED
Start: 2023-01-04

## (undated) RX ORDER — LIDOCAINE HYDROCHLORIDE 10 MG/ML
INJECTION, SOLUTION EPIDURAL; INFILTRATION; INTRACAUDAL; PERINEURAL
Status: DISPENSED
Start: 2023-08-30

## (undated) RX ORDER — MECLIZINE HYDROCHLORIDE 25 MG/1
TABLET ORAL
Status: DISPENSED
Start: 2023-01-04

## (undated) RX ORDER — PROPOFOL 10 MG/ML
INJECTION, EMULSION INTRAVENOUS
Status: DISPENSED
Start: 2023-08-30

## (undated) RX ORDER — GLYCOPYRROLATE 0.2 MG/ML
INJECTION INTRAMUSCULAR; INTRAVENOUS
Status: DISPENSED
Start: 2023-08-30

## (undated) RX ORDER — DEXMEDETOMIDINE HYDROCHLORIDE 4 UG/ML
INJECTION, SOLUTION INTRAVENOUS
Status: DISPENSED
Start: 2023-08-30

## (undated) RX ORDER — ONDANSETRON 2 MG/ML
INJECTION INTRAMUSCULAR; INTRAVENOUS
Status: DISPENSED
Start: 2023-01-04

## (undated) RX ORDER — FENTANYL CITRATE 50 UG/ML
INJECTION, SOLUTION INTRAMUSCULAR; INTRAVENOUS
Status: DISPENSED
Start: 2023-08-30

## (undated) RX ORDER — CEFAZOLIN SODIUM/WATER 2 G/20 ML
SYRINGE (ML) INTRAVENOUS
Status: DISPENSED
Start: 2023-08-30

## (undated) RX ORDER — CEFAZOLIN SODIUM/WATER 2 G/20 ML
SYRINGE (ML) INTRAVENOUS
Status: DISPENSED
Start: 2023-01-04

## (undated) RX ORDER — EPHEDRINE SULFATE 50 MG/ML
INJECTION, SOLUTION INTRAVENOUS
Status: DISPENSED
Start: 2023-08-30

## (undated) RX ORDER — DEXAMETHASONE SODIUM PHOSPHATE 4 MG/ML
INJECTION, SOLUTION INTRA-ARTICULAR; INTRALESIONAL; INTRAMUSCULAR; INTRAVENOUS; SOFT TISSUE
Status: DISPENSED
Start: 2023-01-04

## (undated) RX ORDER — LIDOCAINE HYDROCHLORIDE 10 MG/ML
INJECTION, SOLUTION EPIDURAL; INFILTRATION; INTRACAUDAL; PERINEURAL
Status: DISPENSED
Start: 2023-01-04

## (undated) RX ORDER — LIDOCAINE HYDROCHLORIDE 20 MG/ML
SOLUTION OROPHARYNGEAL
Status: DISPENSED
Start: 2023-02-24